# Patient Record
Sex: MALE | URBAN - METROPOLITAN AREA
[De-identification: names, ages, dates, MRNs, and addresses within clinical notes are randomized per-mention and may not be internally consistent; named-entity substitution may affect disease eponyms.]

---

## 2019-02-17 ENCOUNTER — EMERGENCY (EMERGENCY)
Facility: HOSPITAL | Age: 69
LOS: 1 days | Discharge: HOME | End: 2019-02-17
Attending: EMERGENCY MEDICINE
Payer: MEDICAID

## 2019-02-17 VITALS — SYSTOLIC BLOOD PRESSURE: 193 MMHG | DIASTOLIC BLOOD PRESSURE: 113 MMHG

## 2019-02-17 VITALS
WEIGHT: 177.91 LBS | RESPIRATION RATE: 18 BRPM | DIASTOLIC BLOOD PRESSURE: 100 MMHG | SYSTOLIC BLOOD PRESSURE: 190 MMHG | OXYGEN SATURATION: 99 % | HEIGHT: 70 IN | HEART RATE: 66 BPM | TEMPERATURE: 98 F

## 2019-02-17 PROCEDURE — 99283 EMERGENCY DEPT VISIT LOW MDM: CPT | Mod: GC

## 2019-02-17 PROCEDURE — 73562 X-RAY EXAM OF KNEE 3: CPT | Mod: 26,RT

## 2019-02-17 RX ORDER — IBUPROFEN 200 MG
600 TABLET ORAL ONCE
Refills: 0 | Status: COMPLETED | OUTPATIENT
Start: 2019-02-17 | End: 2019-02-17

## 2019-02-17 RX ADMIN — Medication 600 MILLIGRAM(S): at 11:25

## 2019-02-17 NOTE — ED PROVIDER NOTE - PROGRESS NOTE DETAILS
Attending MD Cordon: Patient re-evaluated and no acute issues at  this time.  Radiology test reviewed with patient and family.  Extensive discussion with patient, son and cousin about need for PMD for control of BP.  Recommended primary care clinic follow up in 1-3 days.  Patient stable for discharge. Follow up instructions given, importance of follow up emphasized, return to ED parameters reviewed and patient verbalized understanding.  All questions answered, all concerns addressed. PGY1/MD Judy. bandage on right knee, pt is given precaution. I have given the pt strict return and follow up precautions. The patient has been provided with a copy of all pertinent results. The patient has been informed of all concerning signs and symptoms to return to Emergency Department, the necessity to follow up with PMD/Clinic/follow up provided within 2-3 days was explained, and the patient reports understanding of above with capacity and insight.

## 2019-02-17 NOTE — ED PROVIDER NOTE - NSFOLLOWUPCLINICS_GEN_ALL_ED_FT
[facility] General Internal Medicine  General Internal Medicine  [address] [id number]  Phone: [phone]  Fax:   Follow Up Time: 1-3 Days

## 2019-02-17 NOTE — ED ADULT NURSE NOTE - CCCP TRG CHIEF CMPLNT
Was the patient seen in the last year in this department? Yes     Does patient have an active prescription for medications requested? No     Received Request Via: Pharmacy   pain, knee

## 2019-02-17 NOTE — ED PROVIDER NOTE - PHYSICAL EXAMINATION
PGY1/MD Judy.   VITALS: reviewed  GEN: No apparent distress, A & O x 4  HEAD/EYES: NC/AT,  anicteric sclerae, no conjunctival pallor  ENT: mucus membranes moist, oropharynx WNL, neck is supple  RESP: lungs CTA with equal breath sounds bilaterally, chest wall nontender and atraumatic  CV: heart with reg rhythm S1, S2, no murmur; distal pulses intact and symmetric bilaterally  ABDOMEN: normoactive bowel sounds, soft, nondistended, nontender, no palpable masses  : no CVAT  MSK: extremities atraumatic and nontender, no edema, no asymmetry. the back is without midline or lateral tenderness, there is no spinal deformity or stepoff and the back is ranged painlessly. the neck has no midline tenderness, deformity, or stepoff, and is ranged painlessly.  SKIN: warm, dry, no rash, no bruising, no cyanosis. color appropriate for ethnicity  NEURO: alert, mentating appropriately, no facial asymmetry.  PSYCH: Affect appropriate    b/l Knee Exam  Sensation: sensation intact to light touch in 5th/1st finger volar tip, inner/outer legs.  Motor: 5/5 strength of plantar/dorsal flexon of the ankle, extension/flexon of the knee, and hip with stable gait.  ROM: full range of motion of right/left ankle, right knee with pain but no limited ROM, bearing weight. +right upper internal joint space tenderness. Negative Janene's, negative Lachman's, anterior/posterior drawer test.  Skin/Inspection: No erythema, no abrasion, no bruises, no effusion appreciated. No displaced patella, or patella tenderness.  Vascular: CRT<2sec in all digits.

## 2019-02-17 NOTE — ED PROVIDER NOTE - OBJECTIVE STATEMENT
PGY1/MD Judy. 69 yo M with no PMH (of note, pt presents to the ED with ZQ=084/100), p/w right knee pain x2-3 days. Worsening, dull, 6/10, no radiation, no erythema/warmth, took tylenol last night with minimum relief, aggravated by moving. Pt started a discomfort on his left knee today. No trauma, no travel. No fever, other joint pain is reported. no discharger from penis or eyes, no burning sensation. No headache, blurry vision or chest pain.

## 2019-02-17 NOTE — ED PROVIDER NOTE - CLINICAL SUMMARY MEDICAL DECISION MAKING FREE TEXT BOX
PGY1/MD Judy. 69 yo M with no PMH, but came with AT=174/100 no ha or chest pain, p/w worsening right knee pain x 2-3 days. No fever, swelling or erythema is appreciated. No signs of septic joint. likely pseudo gout given his high BP or OA. xray, pain med. likely discharge with PMD f/u.

## 2019-02-17 NOTE — ED PROVIDER NOTE - NSFOLLOWUPINSTRUCTIONS_ED_ALL_ED_FT
- FOLLOW UP WITH PRIMARY CARE CLINIC IN 1-3 DAYS  - Return to the ED for new or worsening symptoms.  - Follow up with Orthopedics for your knee pain.  - You may take over the counter pain medication as per package instructions. - FOLLOW UP WITH PRIMARY CARE CLINIC IN 1-3 DAYS  - Return to the ED for new or worsening symptoms.  - Follow up with Orthopedics for your knee pain.  - You may take over the counter pain medication as per package instructions.    1) Take 400-600mg Motrin (also called Advil or Mortrin) every 6 hours as needed for fever/pain/discomfort. You can take this with Tylenol 650-1000 mg (also called acetaminophen) every 6 hours.  Don't use more than 4000 mg of Tylenol in any 24-hour period. Make sure your other prescription/over-the-counter medications don't contain any Tylenol so you don't take too much. If you have any stomach discomfort while taking Motrin, you can use TUMS or Pepcid or Zantac (these can all be bought without a prescription). Please do not take these medications if you do no have pain or if you have any history of bleeding disorders, kidney or liver disease. Do not use ibuprofen if you are on blood thinners (anti-coagulation).  2) If your pain is NOT well controlled with these medications, take pain medication as prescribed.  3) Drink at least 2 Liters or 64 Ounces of water each day.

## 2019-02-17 NOTE — ED ADULT NURSE NOTE - NSIMPLEMENTINTERV_GEN_ALL_ED
Implemented All Universal Safety Interventions:  [place] to call system. Call bell, personal items and telephone within reach. Instruct patient to call for assistance. Room bathroom lighting operational. Non-slip footwear when patient is off stretcher. Physically safe environment: no spills, clutter or unnecessary equipment. Stretcher in lowest position, wheels locked, appropriate side rails in place.

## 2019-02-17 NOTE — ED ADULT NURSE NOTE - OBJECTIVE STATEMENT
Pt c/o right knee pain x "a couple of days."  Pt Pt c/o right knee pain x "a couple of days."  Pt denies any traumatic event or previous history of injury or surgeries to the joint.  Knee pain is medial, worse with bending the knee, better with flexing, ttp, worse in the morning, better when he starts moving around, difficulty weight bearing.  No swelling or redness to area, denies fevers.  Pain radiates up to his lower back.  Tried Tylenol last night without any relief, nothing today.  Pt ambulated to exam room without any active distress.  Pt is conversive, has pain with passive bending of knee, denies numbness/tingling.    denies history of htn.

## 2019-02-17 NOTE — ED PROVIDER NOTE - ATTENDING CONTRIBUTION TO CARE
Attending MD Cordon: I personally have seen and examined this patient.  Resident note reviewed and agree on plan of care and except where noted.  See below for details.     Seen in FT6, son and cousin, does not have PMD    68M with no reported PMH/PSH/Meds/Allergies presents to the ED with R knee pain since 2/8/19 (Friday).  Reports intermittent, reports flexing knee, improved with straightening knee.  Resting improved pain. Denies taking anything for the pain.  Reports sometimes feels it more under the knee and sometimes over the knee.  Reports able to ambulate.  Denies chest pain, shortness of breath, palpitations. Denies abdominal pain, nausea, vomiting, diarrhea, blood in stools. Denies fevers, chills, dizziness, headache. Denies change in vision, double vision, sudden loss of vision.  Reports quit tobacco 6-7 years ago, denies EtOH, denies drugs.  Denies heavy lifting for work.  On exam, NAD, unlabored breathing, head NCAT, PERRL, FROM at neck, no tenderness to midline palpation, no stepoffs along length of spine, lungs CTAB with good inspiratory effort, +S1S2, no m/r/g, abdomen soft with +BS, NT, ND, no CVAT, moving all extremities with 5/5 strength bilateral upper and lower extremities, good and equal  strength bilaterally, able to range bilateral knees, no erythema, no swelling, no warmth, FROM at R knee, +2 DPs; A/P: 68M with R knee pain, no red flags for septic joint, no effusion, ranging freely, suspect arthritic changes, will obtain XR but doubt bony injury

## 2019-02-20 ENCOUNTER — OUTPATIENT (OUTPATIENT)
Dept: OUTPATIENT SERVICES | Facility: HOSPITAL | Age: 69
LOS: 1 days | End: 2019-02-20
Payer: MEDICAID

## 2019-02-20 ENCOUNTER — APPOINTMENT (OUTPATIENT)
Dept: INTERNAL MEDICINE | Facility: CLINIC | Age: 69
End: 2019-02-20

## 2019-02-20 ENCOUNTER — NON-APPOINTMENT (OUTPATIENT)
Age: 69
End: 2019-02-20

## 2019-02-20 VITALS
DIASTOLIC BLOOD PRESSURE: 100 MMHG | SYSTOLIC BLOOD PRESSURE: 160 MMHG | BODY MASS INDEX: 27.28 KG/M2 | WEIGHT: 180 LBS | HEIGHT: 68 IN

## 2019-02-20 DIAGNOSIS — Z84.1 FAMILY HISTORY OF DISORDERS OF KIDNEY AND URETER: ICD-10-CM

## 2019-02-20 DIAGNOSIS — Z87.891 PERSONAL HISTORY OF NICOTINE DEPENDENCE: ICD-10-CM

## 2019-02-20 DIAGNOSIS — I10 ESSENTIAL (PRIMARY) HYPERTENSION: ICD-10-CM

## 2019-02-20 DIAGNOSIS — M17.10 UNILATERAL PRIMARY OSTEOARTHRITIS, UNSPECIFIED KNEE: ICD-10-CM

## 2019-02-20 DIAGNOSIS — Z00.00 ENCOUNTER FOR GENERAL ADULT MEDICAL EXAMINATION W/OUT ABNORMAL FINDINGS: ICD-10-CM

## 2019-02-21 NOTE — HISTORY OF PRESENT ILLNESS
[de-identified] : Patient is a 67 yo M w/no reported PMH presenting to establish care. He was born in [facility]. He moved to the [place] in [place].\par \par He does not know the last time he saw a doctor.\par \par He was seen in the ED on 2/17 with complaint of R knee pain. Xrays at that time only showed small tricompartmental marginal osteophytes. He was found to be hypertensive to 190s/100s. He was not given any medications for hypertension. No labs were drawn. He was instructed to follow up with a PMD as soon as possible.\par \par He has never had surgery.\par \par He reports mother had heart disease and father had kidney problems. 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 Retired 2 years ago, worked for around 20 years.\par \par He lives with his cousin and son at home.\par \par He is a former smoker, quit 7 years ago. He used to smoke 2 cigarettes per day for 45-50 years. He denies EtOH use. He denies recreational drug use. He is sexually active, monogamous. He is interested in being tested for STIs today.\par \par He only takes Tylenol 2 extra strength Tylenol Q6 hours for the last 3 days. Reports not change in knee pain. Reports knee stiffness worse after seated for a long time and in the morning. Has not tried NSAIDs.\par \par He does not remember his last tetanus shot. Declining flu shot today.

## 2019-02-21 NOTE — PHYSICAL EXAM
[No Acute Distress] : no acute distress [Well Nourished] : well nourished [Well Developed] : well developed [Well-Appearing] : well-appearing [Normal Sclera/Conjunctiva] : normal sclera/conjunctiva [PERRL] : pupils equal round and reactive to light [EOMI] : extraocular movements intact [Normal Outer Ear/Nose] : the outer ears and nose were normal in appearance [Normal Oropharynx] : the oropharynx was normal [Normal TMs] : both tympanic membranes were normal [Supple] : supple [No Lymphadenopathy] : no lymphadenopathy [Thyroid Normal, No Nodules] : the thyroid was normal and there were no nodules present [No Respiratory Distress] : no respiratory distress  [Clear to Auscultation] : lungs were clear to auscultation bilaterally [Normal Rate] : normal rate  [Regular Rhythm] : with a regular rhythm [Normal S1, S2] : normal S1 and S2 [No Murmur] : no murmur heard [No Edema] : there was no peripheral edema [Soft] : abdomen soft [Non Tender] : non-tender [Non-distended] : non-distended [Normal Bowel Sounds] : normal bowel sounds [Grossly Normal Strength/Tone] : grossly normal strength/tone [No Rash] : no rash [Normal Gait] : normal gait [Coordination Grossly Intact] : coordination grossly intact [No Focal Deficits] : no focal deficits [Normal Affect] : the affect was normal [Normal Insight/Judgement] : insight and judgment were intact [de-identified] : no effusion noted bilateral knees, +crepitus bilateral knees

## 2019-02-21 NOTE — ASSESSMENT
[FreeTextEntry1] : 69 yo M w/no reported PMH presenting to establish care\par \par 1. HTN\par - patient remains hypertensive in clinic today on 2 checks\par - will start Lisinopril 10mg daily and patient to RTC 5 weeks for BP check\par - EKG ?evidence of LVH, some strain with lateral lead TWI\par - ophthalmology referral for retinal exam\par \par 2. Knee pain\par - secondary to OA\par - recommended trial of NSAIDs and Tylenol\par - PT referral provided\par - ortho referral given so patient may make appointment if PT and trial NSADs not successful to eval for injection\par \par 3. HCM\par - FIT test today\par - TdaP and Prevnar today\par - CBC, CMP, Lipid profile, A1c today\par - HIV, Syphilis and Hep C screen today\par \par RTC 5 weeks for BP check\par \par dw Dr. Greene\par \par Deanna Cunningham, R3

## 2019-02-21 NOTE — REVIEW OF SYSTEMS
[Joint Pain] : joint pain [Fever] : no fever [Chills] : no chills [Fatigue] : no fatigue [Discharge] : no discharge [Pain] : no pain [Vision Problems] : no vision problems [Earache] : no earache [Hearing Loss] : no hearing loss [Nasal Discharge] : no nasal discharge [Sore Throat] : no sore throat [Chest Pain] : no chest pain [Shortness Of Breath] : no shortness of breath [Wheezing] : no wheezing [Abdominal Pain] : no abdominal pain [Vomiting] : no vomiting [Dysuria] : no dysuria [Incontinence] : no incontinence [Nocturia] : no nocturia [Muscle Pain] : no muscle pain [Back Pain] : no back pain [Anxiety] : no anxiety [Depression] : no depression

## 2019-02-25 DIAGNOSIS — E11.9 TYPE 2 DIABETES MELLITUS W/OUT COMPLICATIONS: ICD-10-CM

## 2019-02-25 RX ORDER — ATORVASTATIN CALCIUM 40 MG/1
40 TABLET, FILM COATED ORAL
Qty: 1 | Refills: 3 | Status: ACTIVE | COMMUNITY
Start: 2019-02-25 | End: 1900-01-01

## 2019-03-01 DIAGNOSIS — M17.10 UNILATERAL PRIMARY OSTEOARTHRITIS, UNSPECIFIED KNEE: ICD-10-CM

## 2019-03-27 LAB
ALBUMIN SERPL ELPH-MCNC: 4.8 G/DL
ALP BLD-CCNC: 79 U/L
ALT SERPL-CCNC: 15 U/L
ANION GAP SERPL CALC-SCNC: 25 MMOL/L
AST SERPL-CCNC: 21 U/L
BASOPHILS # BLD AUTO: 0.06 K/UL
BASOPHILS NFR BLD AUTO: 0.7 %
BILIRUB SERPL-MCNC: <0.2 MG/DL
BUN SERPL-MCNC: 17 MG/DL
CALCIUM SERPL-MCNC: 10 MG/DL
CHLORIDE SERPL-SCNC: 108 MMOL/L
CHOLEST SERPL-MCNC: 285 MG/DL
CHOLEST/HDLC SERPL: 7.1 RATIO
CO2 SERPL-SCNC: 21 MMOL/L
CREAT SERPL-MCNC: 0.89 MG/DL
EOSINOPHIL # BLD AUTO: 0.16 K/UL
EOSINOPHIL NFR BLD AUTO: 1.8 %
GLUCOSE SERPL-MCNC: 100 MG/DL
HBA1C MFR BLD HPLC: 6.6 %
HCT VFR BLD CALC: 48.5 %
HDLC SERPL-MCNC: 40 MG/DL
HGB BLD-MCNC: 14.7 G/DL
HIV1+2 AB SPEC QL IA.RAPID: NONREACTIVE
IMM GRANULOCYTES NFR BLD AUTO: 0.2 %
LDLC SERPL CALC-MCNC: NORMAL MG/DL
LYMPHOCYTES # BLD AUTO: 2.4 K/UL
LYMPHOCYTES NFR BLD AUTO: 27.4 %
MAN DIFF?: NORMAL
MCHC RBC-ENTMCNC: 27.9 PG
MCHC RBC-ENTMCNC: 30.3 GM/DL
MCV RBC AUTO: 92 FL
MONOCYTES # BLD AUTO: 0.7 K/UL
MONOCYTES NFR BLD AUTO: 8 %
NEUTROPHILS # BLD AUTO: 5.42 K/UL
NEUTROPHILS NFR BLD AUTO: 61.9 %
PLATELET # BLD AUTO: 276 K/UL
POTASSIUM SERPL-SCNC: 4.3 MMOL/L
PROT SERPL-MCNC: 8 G/DL
RBC # BLD: 5.27 M/UL
RBC # FLD: 14.1 %
SODIUM SERPL-SCNC: 154 MMOL/L
T PALLIDUM AB SER QL IA: NEGATIVE
TRIGL SERPL-MCNC: 407 MG/DL
WBC # FLD AUTO: 8.76 K/UL

## 2019-10-01 ENCOUNTER — OUTPATIENT (OUTPATIENT)
Dept: OUTPATIENT SERVICES | Facility: HOSPITAL | Age: 69
LOS: 1 days | End: 2019-10-01
Payer: MEDICARE

## 2019-10-11 ENCOUNTER — INPATIENT (INPATIENT)
Facility: HOSPITAL | Age: 69
LOS: 9 days | Discharge: HOME | DRG: 233 | End: 2019-10-21
Attending: THORACIC SURGERY (CARDIOTHORACIC VASCULAR SURGERY) | Admitting: HOSPITALIST
Payer: MEDICARE

## 2019-10-11 VITALS
OXYGEN SATURATION: 94 % | DIASTOLIC BLOOD PRESSURE: 111 MMHG | TEMPERATURE: 98 F | HEART RATE: 72 BPM | WEIGHT: 177.91 LBS | HEIGHT: 70 IN | SYSTOLIC BLOOD PRESSURE: 180 MMHG | RESPIRATION RATE: 18 BRPM

## 2019-10-11 DIAGNOSIS — R07.9 CHEST PAIN, UNSPECIFIED: ICD-10-CM

## 2019-10-11 LAB
ALBUMIN SERPL ELPH-MCNC: 4.2 G/DL — SIGNIFICANT CHANGE UP (ref 3.3–5)
ALP SERPL-CCNC: 74 U/L — SIGNIFICANT CHANGE UP (ref 40–120)
ALT FLD-CCNC: 19 U/L — SIGNIFICANT CHANGE UP (ref 10–45)
ANION GAP SERPL CALC-SCNC: 11 MMOL/L — SIGNIFICANT CHANGE UP (ref 5–17)
APTT BLD: 33.4 SEC — SIGNIFICANT CHANGE UP (ref 27.5–36.3)
AST SERPL-CCNC: 17 U/L — SIGNIFICANT CHANGE UP (ref 10–40)
BASOPHILS # BLD AUTO: 0.04 K/UL — SIGNIFICANT CHANGE UP (ref 0–0.2)
BASOPHILS NFR BLD AUTO: 0.6 % — SIGNIFICANT CHANGE UP (ref 0–2)
BILIRUB SERPL-MCNC: 0.3 MG/DL — SIGNIFICANT CHANGE UP (ref 0.2–1.2)
BUN SERPL-MCNC: 21 MG/DL — SIGNIFICANT CHANGE UP (ref 7–23)
CALCIUM SERPL-MCNC: 9.1 MG/DL — SIGNIFICANT CHANGE UP (ref 8.4–10.5)
CHLORIDE SERPL-SCNC: 105 MMOL/L — SIGNIFICANT CHANGE UP (ref 96–108)
CK MB CFR SERPL CALC: 6.6 NG/ML — SIGNIFICANT CHANGE UP (ref 0–6.7)
CK MB CFR SERPL CALC: 6.7 NG/ML — SIGNIFICANT CHANGE UP (ref 0–6.7)
CO2 SERPL-SCNC: 27 MMOL/L — SIGNIFICANT CHANGE UP (ref 22–31)
CREAT SERPL-MCNC: 0.82 MG/DL — SIGNIFICANT CHANGE UP (ref 0.5–1.3)
EOSINOPHIL # BLD AUTO: 0.2 K/UL — SIGNIFICANT CHANGE UP (ref 0–0.5)
EOSINOPHIL NFR BLD AUTO: 2.8 % — SIGNIFICANT CHANGE UP (ref 0–6)
GLUCOSE SERPL-MCNC: 126 MG/DL — HIGH (ref 70–99)
HCT VFR BLD CALC: 40.9 % — SIGNIFICANT CHANGE UP (ref 39–50)
HGB BLD-MCNC: 13.3 G/DL — SIGNIFICANT CHANGE UP (ref 13–17)
IMM GRANULOCYTES NFR BLD AUTO: 0.1 % — SIGNIFICANT CHANGE UP (ref 0–1.5)
INR BLD: 1.08 RATIO — SIGNIFICANT CHANGE UP (ref 0.88–1.16)
LYMPHOCYTES # BLD AUTO: 2.5 K/UL — SIGNIFICANT CHANGE UP (ref 1–3.3)
LYMPHOCYTES # BLD AUTO: 35.4 % — SIGNIFICANT CHANGE UP (ref 13–44)
MAGNESIUM SERPL-MCNC: 2.4 MG/DL — SIGNIFICANT CHANGE UP (ref 1.6–2.6)
MCHC RBC-ENTMCNC: 28.8 PG — SIGNIFICANT CHANGE UP (ref 27–34)
MCHC RBC-ENTMCNC: 32.5 GM/DL — SIGNIFICANT CHANGE UP (ref 32–36)
MCV RBC AUTO: 88.5 FL — SIGNIFICANT CHANGE UP (ref 80–100)
MONOCYTES # BLD AUTO: 0.57 K/UL — SIGNIFICANT CHANGE UP (ref 0–0.9)
MONOCYTES NFR BLD AUTO: 8.1 % — SIGNIFICANT CHANGE UP (ref 2–14)
NEUTROPHILS # BLD AUTO: 3.75 K/UL — SIGNIFICANT CHANGE UP (ref 1.8–7.4)
NEUTROPHILS NFR BLD AUTO: 53 % — SIGNIFICANT CHANGE UP (ref 43–77)
NRBC # BLD: 0 /100 WBCS — SIGNIFICANT CHANGE UP (ref 0–0)
PHOSPHATE SERPL-MCNC: 2.6 MG/DL — SIGNIFICANT CHANGE UP (ref 2.5–4.5)
PLATELET # BLD AUTO: 264 K/UL — SIGNIFICANT CHANGE UP (ref 150–400)
POTASSIUM SERPL-MCNC: 3.8 MMOL/L — SIGNIFICANT CHANGE UP (ref 3.5–5.3)
POTASSIUM SERPL-SCNC: 3.8 MMOL/L — SIGNIFICANT CHANGE UP (ref 3.5–5.3)
PROT SERPL-MCNC: 7.5 G/DL — SIGNIFICANT CHANGE UP (ref 6–8.3)
PROTHROM AB SERPL-ACNC: 12.3 SEC — SIGNIFICANT CHANGE UP (ref 10–12.9)
RBC # BLD: 4.62 M/UL — SIGNIFICANT CHANGE UP (ref 4.2–5.8)
RBC # FLD: 13.7 % — SIGNIFICANT CHANGE UP (ref 10.3–14.5)
SODIUM SERPL-SCNC: 143 MMOL/L — SIGNIFICANT CHANGE UP (ref 135–145)
TROPONIN T, HIGH SENSITIVITY RESULT: 49 NG/L — SIGNIFICANT CHANGE UP (ref 0–51)
TROPONIN T, HIGH SENSITIVITY RESULT: 54 NG/L — HIGH (ref 0–51)
WBC # BLD: 7.07 K/UL — SIGNIFICANT CHANGE UP (ref 3.8–10.5)
WBC # FLD AUTO: 7.07 K/UL — SIGNIFICANT CHANGE UP (ref 3.8–10.5)

## 2019-10-11 PROCEDURE — 93010 ELECTROCARDIOGRAM REPORT: CPT

## 2019-10-11 PROCEDURE — 99291 CRITICAL CARE FIRST HOUR: CPT

## 2019-10-11 PROCEDURE — 71046 X-RAY EXAM CHEST 2 VIEWS: CPT | Mod: 26

## 2019-10-11 RX ORDER — HEPARIN SODIUM 5000 [USP'U]/ML
4700 INJECTION INTRAVENOUS; SUBCUTANEOUS ONCE
Refills: 0 | Status: COMPLETED | OUTPATIENT
Start: 2019-10-11 | End: 2019-10-11

## 2019-10-11 RX ORDER — HEPARIN SODIUM 5000 [USP'U]/ML
4700 INJECTION INTRAVENOUS; SUBCUTANEOUS EVERY 6 HOURS
Refills: 0 | Status: DISCONTINUED | OUTPATIENT
Start: 2019-10-11 | End: 2019-10-15

## 2019-10-11 RX ORDER — HEPARIN SODIUM 5000 [USP'U]/ML
INJECTION INTRAVENOUS; SUBCUTANEOUS
Qty: 25000 | Refills: 0 | Status: DISCONTINUED | OUTPATIENT
Start: 2019-10-11 | End: 2019-10-15

## 2019-10-11 RX ORDER — ASPIRIN/CALCIUM CARB/MAGNESIUM 324 MG
324 TABLET ORAL ONCE
Refills: 0 | Status: COMPLETED | OUTPATIENT
Start: 2019-10-11 | End: 2019-10-11

## 2019-10-11 RX ORDER — TICAGRELOR 90 MG/1
180 TABLET ORAL ONCE
Refills: 0 | Status: COMPLETED | OUTPATIENT
Start: 2019-10-11 | End: 2019-10-11

## 2019-10-11 RX ADMIN — Medication 324 MILLIGRAM(S): at 21:20

## 2019-10-11 RX ADMIN — HEPARIN SODIUM 950 UNIT(S)/HR: 5000 INJECTION INTRAVENOUS; SUBCUTANEOUS at 22:44

## 2019-10-11 RX ADMIN — HEPARIN SODIUM 4700 UNIT(S): 5000 INJECTION INTRAVENOUS; SUBCUTANEOUS at 22:46

## 2019-10-11 RX ADMIN — TICAGRELOR 180 MILLIGRAM(S): 90 TABLET ORAL at 21:30

## 2019-10-11 NOTE — ED ADULT NURSE NOTE - OBJECTIVE STATEMENT
68 y/o M pt w/ no pmh, present to ED for left neck, shoulder, chest and arm pain, symptoms started couple days ago, pt report pain worsen with exertion, along with SOB, pain is sharp in nature, here ED pt A&Ox3, breathing spontaneous, unlabored w/o distress on room air, report left neck pain, no chest or arm pain at this, S1, S2 heard, neg JVD, peripheral pulses strong and present, MAERx4, heplock placed, labs drawn, placed on CM NSR, awaits MD daniels

## 2019-10-11 NOTE — ED PROVIDER NOTE - ATTENDING CONTRIBUTION TO CARE
Agree with above, Angel Lindsey MD, FACEP  Based on patient's history and physical exam, as well as the results of today's workup, I feel that patient warrants admission to the hospital for further workup/evaluation and continued management. I discussed the findings of today's workup with the patient and addressed the patient's questions and concerns. The patient was agreeable with admission. Our team spoke with the inpatient receiving team who accepted the patient for admission and subsequently took over the patient's care at the time of admission.

## 2019-10-11 NOTE — ED PROVIDER NOTE - OBJECTIVE STATEMENT
68yo male no PMHx presents with 3 day history of left sided neck and chest pain.  Reports sudden onset of left neck pain 3 days ago with radiation down the back to the left breast area. Intermittent, worsen with exertion and leaning forward. Pain starts as sharp then became duller. Also reports occasional numbness of the left arm and dyspnea. No prior similar event. No known cardiac history.  Denies fever, chill, dizziness, headache, n/v/c/d, or urinary symptoms.   FHx of MI in mother age 40s.  Former smoker 1ppd x 30 years, quitted 2017.

## 2019-10-11 NOTE — ED PROVIDER NOTE - CLINICAL SUMMARY MEDICAL DECISION MAKING FREE TEXT BOX
68yo male no PMHx presents with 3 day history of left sided neck and chest pain. Need to r/o cardiac etiology given exertional component and family history. Check basic labs, trop/ckmb, cxray and EKG.

## 2019-10-11 NOTE — ED ADULT NURSE REASSESSMENT NOTE - NS ED NURSE REASSESS COMMENT FT1
Second IV placed for additional access. Heparin bolus and infusion initiated as per orders based off Heparin nomogram. Second RN present to confirm correct medication administration. Patient currently safe and comfortable. Pt educated on S/S of bleeding, verbalized understanding. Will continue to monitor.

## 2019-10-11 NOTE — ED PROVIDER NOTE - NSTIMEPROVIDERCAREINITIATE_GEN_ER
I will START or STAY ON the medications listed below when I get home from the hospital:    Cipro 500 mg oral tablet  -- 500 tab(s) by mouth every 12 hours   -- Avoid prolonged or excessive exposure to direct and/or artificial sunlight while taking this medication.  Check with your doctor before becoming pregnant.  Do not take dairy products, antacids, or iron preparations within one hour of this medication.  Finish all this medication unless otherwise directed by prescriber.  Medication should be taken with plenty of water.    -- Indication: For Infection
11-Oct-2019 19:07

## 2019-10-11 NOTE — ED ADULT NURSE REASSESSMENT NOTE - NS ED NURSE REASSESS COMMENT FT1
Pt is breathing unlabored on RA. VSS. Pt endorses pain to L neck, L side of chest, and L arm. Educated pt on plan of care. Safety and comfort maintained. Awaiting lab results. Pt NSR on cardiac monitor. Family at bedside. Call bell within reach.

## 2019-10-11 NOTE — ED PROVIDER NOTE - CRITICAL CARE PROVIDED
Sedation, oxygen delivery, and monitoring per anesthesia for endoscopic procedure.  
direct patient care (not related to procedure)/additional history taking/interpretation of diagnostic studies/documentation/consultation with other physicians/consult w/ pt's family directly relating to pts condition

## 2019-10-11 NOTE — ED PROVIDER NOTE - NS ED ROS FT
REVIEW OF SYSTEMS:    CONSTITUTIONAL: No weakness, fevers or chills  EYES: No vertigo or throat pain  ENT: No visual changes, eye pain  MOUTH: moist jose manuel mucosal, no mouth ulcers  NECK: + left sided neck pain. No stiffness  RESPIRATORY: No cough, wheezing, hemoptysis; No shortness of breath  CARDIOVASCULAR: + left breast pain. No palpitations  GASTROINTESTINAL: No abdominal or epigastric pain. No nausea, vomiting, or hematemesis; No diarrhea or constipation. No melena or hematochezia.  GENITOURINARY: No dysuria, frequency or hematuria  NEUROLOGICAL: + occasional numbness in the left arm  SKIN: No itching, rashes

## 2019-10-11 NOTE — ED ADULT NURSE NOTE - NSIMPLEMENTINTERV_GEN_ALL_ED
Implemented All Universal Safety Interventions:  [place] to call system. Call bell, personal items and telephone within reach. Instruct patient to call for assistance. Room bathroom lighting operational. Non-slip footwear when patient is off stretcher. Physically safe environment: no spills, clutter or unnecessary equipment. Stretcher in lowest position, wheels locked, appropriate side rails in place.

## 2019-10-11 NOTE — ED PROVIDER NOTE - PHYSICAL EXAMINATION
PHYSICAL EXAM:  GENERAL: NAD, well-developed  HEAD:  Atraumatic, Normocephalic  EYES: EOMI, PERRLA, conjunctiva and sclera clear  NECK: Supple. TTP of the left base of the neck  CHEST/LUNG: Clear to auscultation bilaterally; No wheeze. No reproducible chest pain  HEART: Regular rate and rhythm; No murmurs, rubs, or gallops  ABDOMEN: Soft, Nontender, Nondistended; Bowel sounds present  EXTREMITIES:  2+ Peripheral Pulses, No clubbing, cyanosis, or edema  PSYCH: AAOx3  NEUROLOGY: non-focal  SKIN: No rashes or lesions

## 2019-10-12 DIAGNOSIS — I10 ESSENTIAL (PRIMARY) HYPERTENSION: ICD-10-CM

## 2019-10-12 DIAGNOSIS — E78.5 HYPERLIPIDEMIA, UNSPECIFIED: ICD-10-CM

## 2019-10-12 DIAGNOSIS — Z29.9 ENCOUNTER FOR PROPHYLACTIC MEASURES, UNSPECIFIED: ICD-10-CM

## 2019-10-12 DIAGNOSIS — Z87.898 PERSONAL HISTORY OF OTHER SPECIFIED CONDITIONS: ICD-10-CM

## 2019-10-12 DIAGNOSIS — I20.0 UNSTABLE ANGINA: ICD-10-CM

## 2019-10-12 LAB
ANION GAP SERPL CALC-SCNC: 13 MMOL/L — SIGNIFICANT CHANGE UP (ref 5–17)
APTT BLD: 52.2 SEC — HIGH (ref 27.5–36.3)
APTT BLD: 58.5 SEC — HIGH (ref 27.5–36.3)
APTT BLD: 83.8 SEC — HIGH (ref 27.5–36.3)
BASOPHILS # BLD AUTO: 0.05 K/UL — SIGNIFICANT CHANGE UP (ref 0–0.2)
BASOPHILS NFR BLD AUTO: 0.7 % — SIGNIFICANT CHANGE UP (ref 0–2)
BLD GP AB SCN SERPL QL: NEGATIVE — SIGNIFICANT CHANGE UP
BUN SERPL-MCNC: 13 MG/DL — SIGNIFICANT CHANGE UP (ref 7–23)
CALCIUM SERPL-MCNC: 8.9 MG/DL — SIGNIFICANT CHANGE UP (ref 8.4–10.5)
CHLORIDE SERPL-SCNC: 103 MMOL/L — SIGNIFICANT CHANGE UP (ref 96–108)
CHOLEST SERPL-MCNC: 244 MG/DL — HIGH (ref 10–199)
CO2 SERPL-SCNC: 23 MMOL/L — SIGNIFICANT CHANGE UP (ref 22–31)
CREAT SERPL-MCNC: 0.57 MG/DL — SIGNIFICANT CHANGE UP (ref 0.5–1.3)
EOSINOPHIL # BLD AUTO: 0.2 K/UL — SIGNIFICANT CHANGE UP (ref 0–0.5)
EOSINOPHIL NFR BLD AUTO: 2.8 % — SIGNIFICANT CHANGE UP (ref 0–6)
GLUCOSE SERPL-MCNC: 107 MG/DL — HIGH (ref 70–99)
HBA1C BLD-MCNC: 6.2 % — HIGH (ref 4–5.6)
HCT VFR BLD CALC: 42.9 % — SIGNIFICANT CHANGE UP (ref 39–50)
HCT VFR BLD CALC: 43.9 % — SIGNIFICANT CHANGE UP (ref 39–50)
HDLC SERPL-MCNC: 48 MG/DL — SIGNIFICANT CHANGE UP
HGB BLD-MCNC: 14.3 G/DL — SIGNIFICANT CHANGE UP (ref 13–17)
HGB BLD-MCNC: 14.3 G/DL — SIGNIFICANT CHANGE UP (ref 13–17)
IMM GRANULOCYTES NFR BLD AUTO: 0.4 % — SIGNIFICANT CHANGE UP (ref 0–1.5)
LIPID PNL WITH DIRECT LDL SERPL: 176 MG/DL — HIGH
LYMPHOCYTES # BLD AUTO: 1.94 K/UL — SIGNIFICANT CHANGE UP (ref 1–3.3)
LYMPHOCYTES # BLD AUTO: 27.6 % — SIGNIFICANT CHANGE UP (ref 13–44)
MAGNESIUM SERPL-MCNC: 2.2 MG/DL — SIGNIFICANT CHANGE UP (ref 1.6–2.6)
MCHC RBC-ENTMCNC: 28.5 PG — SIGNIFICANT CHANGE UP (ref 27–34)
MCHC RBC-ENTMCNC: 28.8 PG — SIGNIFICANT CHANGE UP (ref 27–34)
MCHC RBC-ENTMCNC: 32.6 GM/DL — SIGNIFICANT CHANGE UP (ref 32–36)
MCHC RBC-ENTMCNC: 33.3 GM/DL — SIGNIFICANT CHANGE UP (ref 32–36)
MCV RBC AUTO: 85.5 FL — SIGNIFICANT CHANGE UP (ref 80–100)
MCV RBC AUTO: 88.5 FL — SIGNIFICANT CHANGE UP (ref 80–100)
MONOCYTES # BLD AUTO: 0.56 K/UL — SIGNIFICANT CHANGE UP (ref 0–0.9)
MONOCYTES NFR BLD AUTO: 8 % — SIGNIFICANT CHANGE UP (ref 2–14)
NEUTROPHILS # BLD AUTO: 4.25 K/UL — SIGNIFICANT CHANGE UP (ref 1.8–7.4)
NEUTROPHILS NFR BLD AUTO: 60.5 % — SIGNIFICANT CHANGE UP (ref 43–77)
NRBC # BLD: 0 /100 WBCS — SIGNIFICANT CHANGE UP (ref 0–0)
PHOSPHATE SERPL-MCNC: 2.9 MG/DL — SIGNIFICANT CHANGE UP (ref 2.5–4.5)
PLATELET # BLD AUTO: 268 K/UL — SIGNIFICANT CHANGE UP (ref 150–400)
PLATELET # BLD AUTO: 269 K/UL — SIGNIFICANT CHANGE UP (ref 150–400)
POTASSIUM SERPL-MCNC: 3.2 MMOL/L — LOW (ref 3.5–5.3)
POTASSIUM SERPL-SCNC: 3.2 MMOL/L — LOW (ref 3.5–5.3)
RBC # BLD: 4.96 M/UL — SIGNIFICANT CHANGE UP (ref 4.2–5.8)
RBC # BLD: 5.02 M/UL — SIGNIFICANT CHANGE UP (ref 4.2–5.8)
RBC # FLD: 13.4 % — SIGNIFICANT CHANGE UP (ref 10.3–14.5)
RBC # FLD: 13.7 % — SIGNIFICANT CHANGE UP (ref 10.3–14.5)
RH IG SCN BLD-IMP: POSITIVE — SIGNIFICANT CHANGE UP
SODIUM SERPL-SCNC: 139 MMOL/L — SIGNIFICANT CHANGE UP (ref 135–145)
TOTAL CHOLESTEROL/HDL RATIO MEASUREMENT: 5.1 RATIO — SIGNIFICANT CHANGE UP (ref 3.4–9.6)
TRIGL SERPL-MCNC: 99 MG/DL — SIGNIFICANT CHANGE UP (ref 10–149)
TROPONIN T, HIGH SENSITIVITY RESULT: 44 NG/L — SIGNIFICANT CHANGE UP (ref 0–51)
TSH SERPL-MCNC: 3.36 UIU/ML — SIGNIFICANT CHANGE UP (ref 0.27–4.2)
WBC # BLD: 6.56 K/UL — SIGNIFICANT CHANGE UP (ref 3.8–10.5)
WBC # BLD: 7.03 K/UL — SIGNIFICANT CHANGE UP (ref 3.8–10.5)
WBC # FLD AUTO: 6.56 K/UL — SIGNIFICANT CHANGE UP (ref 3.8–10.5)
WBC # FLD AUTO: 7.03 K/UL — SIGNIFICANT CHANGE UP (ref 3.8–10.5)

## 2019-10-12 PROCEDURE — 12345: CPT | Mod: NC,GC

## 2019-10-12 PROCEDURE — 99223 1ST HOSP IP/OBS HIGH 75: CPT

## 2019-10-12 PROCEDURE — 99223 1ST HOSP IP/OBS HIGH 75: CPT | Mod: GC

## 2019-10-12 RX ORDER — LISINOPRIL 2.5 MG/1
10 TABLET ORAL DAILY
Refills: 0 | Status: DISCONTINUED | OUTPATIENT
Start: 2019-10-12 | End: 2019-10-15

## 2019-10-12 RX ORDER — LISINOPRIL 2.5 MG/1
5 TABLET ORAL ONCE
Refills: 0 | Status: COMPLETED | OUTPATIENT
Start: 2019-10-12 | End: 2019-10-12

## 2019-10-12 RX ORDER — LISINOPRIL 2.5 MG/1
5 TABLET ORAL DAILY
Refills: 0 | Status: DISCONTINUED | OUTPATIENT
Start: 2019-10-12 | End: 2019-10-12

## 2019-10-12 RX ORDER — ASPIRIN/CALCIUM CARB/MAGNESIUM 324 MG
81 TABLET ORAL DAILY
Refills: 0 | Status: DISCONTINUED | OUTPATIENT
Start: 2019-10-12 | End: 2019-10-17

## 2019-10-12 RX ORDER — POTASSIUM CHLORIDE 20 MEQ
40 PACKET (EA) ORAL ONCE
Refills: 0 | Status: COMPLETED | OUTPATIENT
Start: 2019-10-12 | End: 2019-10-12

## 2019-10-12 RX ORDER — ATORVASTATIN CALCIUM 80 MG/1
80 TABLET, FILM COATED ORAL AT BEDTIME
Refills: 0 | Status: DISCONTINUED | OUTPATIENT
Start: 2019-10-12 | End: 2019-10-17

## 2019-10-12 RX ORDER — INFLUENZA VIRUS VACCINE 15; 15; 15; 15 UG/.5ML; UG/.5ML; UG/.5ML; UG/.5ML
0.5 SUSPENSION INTRAMUSCULAR ONCE
Refills: 0 | Status: DISCONTINUED | OUTPATIENT
Start: 2019-10-12 | End: 2019-10-15

## 2019-10-12 RX ORDER — METOPROLOL TARTRATE 50 MG
12.5 TABLET ORAL
Refills: 0 | Status: DISCONTINUED | OUTPATIENT
Start: 2019-10-12 | End: 2019-10-17

## 2019-10-12 RX ORDER — POTASSIUM CHLORIDE 20 MEQ
40 PACKET (EA) ORAL EVERY 4 HOURS
Refills: 0 | Status: COMPLETED | OUTPATIENT
Start: 2019-10-12 | End: 2019-10-12

## 2019-10-12 RX ORDER — TICAGRELOR 90 MG/1
90 TABLET ORAL EVERY 12 HOURS
Refills: 0 | Status: DISCONTINUED | OUTPATIENT
Start: 2019-10-12 | End: 2019-10-14

## 2019-10-12 RX ADMIN — Medication 81 MILLIGRAM(S): at 12:44

## 2019-10-12 RX ADMIN — LISINOPRIL 5 MILLIGRAM(S): 2.5 TABLET ORAL at 15:24

## 2019-10-12 RX ADMIN — Medication 40 MILLIEQUIVALENT(S): at 12:44

## 2019-10-12 RX ADMIN — HEPARIN SODIUM 800 UNIT(S)/HR: 5000 INJECTION INTRAVENOUS; SUBCUTANEOUS at 06:33

## 2019-10-12 RX ADMIN — LISINOPRIL 5 MILLIGRAM(S): 2.5 TABLET ORAL at 02:09

## 2019-10-12 RX ADMIN — TICAGRELOR 90 MILLIGRAM(S): 90 TABLET ORAL at 06:01

## 2019-10-12 RX ADMIN — Medication 12.5 MILLIGRAM(S): at 17:30

## 2019-10-12 RX ADMIN — HEPARIN SODIUM 950 UNIT(S)/HR: 5000 INJECTION INTRAVENOUS; SUBCUTANEOUS at 21:10

## 2019-10-12 RX ADMIN — HEPARIN SODIUM 0 UNIT(S)/HR: 5000 INJECTION INTRAVENOUS; SUBCUTANEOUS at 06:00

## 2019-10-12 RX ADMIN — ATORVASTATIN CALCIUM 80 MILLIGRAM(S): 80 TABLET, FILM COATED ORAL at 21:10

## 2019-10-12 RX ADMIN — Medication 40 MILLIEQUIVALENT(S): at 15:25

## 2019-10-12 RX ADMIN — HEPARIN SODIUM 950 UNIT(S)/HR: 5000 INJECTION INTRAVENOUS; SUBCUTANEOUS at 13:25

## 2019-10-12 RX ADMIN — Medication 40 MILLIEQUIVALENT(S): at 06:01

## 2019-10-12 RX ADMIN — ATORVASTATIN CALCIUM 80 MILLIGRAM(S): 80 TABLET, FILM COATED ORAL at 01:56

## 2019-10-12 RX ADMIN — TICAGRELOR 90 MILLIGRAM(S): 90 TABLET ORAL at 17:30

## 2019-10-12 NOTE — H&P ADULT - PROBLEM SELECTOR PLAN 1
Discussed with on-call cardiology fellow Dr. Cedric Turner who informs of concern that patient may have unstable angina and will determine in am if patient is candidate for catheterization  - s/p asa/ticagrelor load on heparin infusion  - c/w maintenance asa and ticagrelor with heparin infusion. start atorvastatin 80d.  - obtain TTE in am  - obtain lipid, HbA1c, TSH to risk stratify

## 2019-10-12 NOTE — H&P ADULT - NSICDXPASTMEDICALHX_GEN_ALL_CORE_FT
PAST MEDICAL HISTORY:  H/O impaired glucose tolerance     HLD (hyperlipidemia)     HTN (hypertension)

## 2019-10-12 NOTE — H&P ADULT - NSHPREVIEWOFSYSTEMS_GEN_ALL_CORE
CONSTITUTIONAL: No fever, no chills  EYES: No eye pain, no visual disturbance  Mouth: no pain in mouth, no cuts  RESPIRATORY: No cough, sob with chest pain  CARDIOVASCULAR: CP+, no palpitations  GASTROINTESTINAL: no abdominal pain, no n/v/d  GENITOURINARY: No dysuria, no hematuria  Heme: No easy bruising, no swelling appreciated in neck/armpit/groin  NEUROLOGICAL: No headaches, no paralysis  SKIN: No itching, no rashes  MUSCULOSKELETAL: No joint pain, no joint swelling

## 2019-10-12 NOTE — H&P ADULT - NSHPSOCIALHISTORY_GEN_ALL_CORE
former smoker as above, no alcohol abuse reported, lives with family, originally from [facility]

## 2019-10-12 NOTE — CONSULT NOTE ADULT - ASSESSMENT
70 y/o M w/ no sig PMH but is a former smoker c/o CP x 2D.    #Chest pain  -Typical by description. Concern for unstable angina.  -Ryann unremarkable thus far; can c/w trend cTn and CK/CKMB  -Reasonable to load w/ ASA/ticagrelor  -C/w hep gtt  -Provide superior BP control  -Nitro SL PRN  -Check A1c, lipids  -Resting TTE in AM  -Pt would likely benefit from ischemia eval. Favor LHC, which cannot be done until Mon vs less likely stress test.    #Will d/w attg in AM  #Call w/ any Qs    Cedric Turner MD  Cardiology Fellow  [phone]  All Cardiology service information can be found 24/7 on [email or web address], password: [email or web address] 68 y/o M w/ no sig PMH but is a former smoker c/o CP x 2D.      #1.  Chest pain  -Typical by description c/w unstable angina.  -Ryann unremarkable thus far; can c/w trend cTn and CK/CKMB  -Reasonable to load w/ ASA/ticagrelor  -C/W hep gtt  -Provide BP control  -Nitro SL PRN  -Check A1c, lipids  -Resting TTE in AM  -Pt would benefit from ischemia eval. Favor LHC, which cannot be done until Mon unless patient becomes unstable.      #Call w/ any Qs    Cerdic Turner MD  Cardiology Fellow  [phone]    Alejandro Cadet M.D.  Cardiology Attending, Consult Service  [phone] or beeper     For all Cardiology service contact information, go to [email or web address] and use "[place]" to login.   All Cardiology service information can be found 24/7 on [email or web address], password: [email or web address]

## 2019-10-12 NOTE — H&P ADULT - ATTENDING COMMENTS
Patient assigned to me by night hospitalist in charge for management and care for patient for this evening only. Care to be resumed by day hospitalist at 08:00 in the morning and thereafter.

## 2019-10-12 NOTE — H&P ADULT - ASSESSMENT
69M [place] w/ former smoker (quit 8 year; 10 pack year), impaired glucose tolerance, HTN, HLD presents to [facility] for chest pain

## 2019-10-12 NOTE — H&P ADULT - NSHPPHYSICALEXAM_GEN_ALL_CORE
Vital Signs Last 24 Hrs  T(C): 36.7 (12 Oct 2019 00:38), Max: 37.1 (11 Oct 2019 22:55)  T(F): 98.1 (12 Oct 2019 00:38), Max: 98.8 (11 Oct 2019 22:55)  HR: 64 (12 Oct 2019 00:38) (59 - 72)  BP: 178/98 (12 Oct 2019 00:38) (159/97 - 187/100)  RR: 18 (12 Oct 2019 00:38) (14 - 18)  SpO2: 97% (12 Oct 2019 00:38) (94% - 98%) on room air    GENERAL: NAD, well-developed  HEAD:  Atraumatic, Normocephalic  EYES: EOMI, PERRLA, conjunctiva and sclera clear  Mouth: MMM, no lesions  NECK: Supple, no appreciable masses  Lung: normal work of breathing, cta b/l  Chest: S1&S2+, rrr, no m/r/g appreciated  ABDOMEN: bs+, soft, nt, nd, no appreciable masses  : No longo catheter, no CVA tenderness  EXTREMITIES:  radial pulse present b/l, PT present b/l, no pitting edema present  Neuro: Alert and appropriate to conversation, no paralysis  SKIN: warm and dry, chronic burn on medial lower extremity

## 2019-10-12 NOTE — H&P ADULT - NSHPLABSRESULTS_GEN_ALL_CORE
Personally reviewed available labs, imaging and ekg  Labs  CBC Full  -  ( 11 Oct 2019 20:20 )  WBC Count : 7.07 K/uL  RBC Count : 4.62 M/uL  Hemoglobin : 13.3 g/dL  Hematocrit : 40.9 %  Platelet Count - Automated : 264 K/uL  Mean Cell Volume : 88.5 fl  Mean Cell Hemoglobin : 28.8 pg  Mean Cell Hemoglobin Concentration : 32.5 gm/dL  Auto Neutrophil # : 3.75 K/uL  Auto Lymphocyte # : 2.50 K/uL  Auto Monocyte # : 0.57 K/uL  Auto Eosinophil # : 0.20 K/uL  Auto Basophil # : 0.04 K/uL  Auto Neutrophil % : 53.0 %  Auto Lymphocyte % : 35.4 %  Auto Monocyte % : 8.1 %  Auto Eosinophil % : 2.8 %  Auto Basophil % : 0.6 %  143  |  105  |  21  ----------------------------<  126<H>  3.8   |  27  |  0.82  Ca    9.1      11 Oct 2019 20:20  Phos  2.6     10-11  Mg     2.4     10-11  TPro  7.5  /  Alb  4.2  /  TBili  0.3  /  DBili  x   /  AST  17  /  ALT  19  /  AlkPhos  74  10-11  PT/INR - ( 11 Oct 2019 22:06 )   PT: 12.3 sec;   INR: 1.08 ratio    PTT - ( 11 Oct 2019 22:06 )  PTT:33.4 sec    CARDIAC MARKERS ( 11 Oct 2019 22:06 )  x     / x     / x     / x     / 6.7 ng/mL  CARDIAC MARKERS ( 11 Oct 2019 20:20 )  x     / x     / x     / x     / 6.6 ng/mL    Imaging  CXR with no lobar airspace opacification  EKG: NSR 66bpm w/ incomplete RBBB no ST elevation c/w STEMI appreciated

## 2019-10-12 NOTE — PROGRESS NOTE ADULT - ASSESSMENT
69M [place] w/ former smoker (quit 8 year; 10 pack year), impaired glucose tolerance, HTN, HLD presents to [facility] for chest pain 69M [place] w/ former smoker (quit 8 years ago; 1/2 pack for 30 years), impaired glucose tolerance, HTN, HLD presents to [facility] for chest pain 69M [place] w/ former smoker (quit 8 years ago; 1/2 PPD for 25 years), impaired glucose tolerance, HTN, HLD presents to [facility] for chest pain

## 2019-10-12 NOTE — PROGRESS NOTE ADULT - PROBLEM SELECTOR PLAN 4
- pending lipid panel - elevated cholesterol and direct LDL w/ rest of lipid panel values wnl  - c/w atorvastatin 80mg qhs

## 2019-10-12 NOTE — CONSULT NOTE ADULT - SUBJECTIVE AND OBJECTIVE BOX
Patient seen and evaluated at bedside    Chief Complaint: CP    HPI: 68 y/o M w/ no sig PMH but is a former smoker c/o CP x 2D. Pt states that he was in his USOH but woke up w/ chest discomfort ~2D PTA. Pain is located in his L neck, L chest, and L shoulder/back/arm. Pain described as deep/sharp. It is severe in intensity. Pain is intermittent; it is exacerbated by exertion and relieves w/ rest. He reports associated dyspnea. He denies any associated nausea/diaphoresis. Pt has never had hx of CP. At baseline, his maximal ET was unltd. He denies any CP at time of my exam.      PMHx:   No pertinent past medical history      PSHx:   No significant past surgical history      Allergies:  No Known Allergies      Home Meds: None.    Current Medications:   heparin  Infusion.  Unit(s)/Hr IV Continuous <Continuous>  heparin  Injectable 4700 Unit(s) IV Push every 6 hours PRN  influenza   Vaccine 0.5 milliLiter(s) IntraMuscular once      FAMILY HISTORY: No premature CAD.      Social History:  Smoking History: Smoked for several decades; quit ~8Y ago  Alcohol Use: Denies  Drug Use: Denies    REVIEW OF SYSTEMS:  CONSTITUTIONAL: No weakness, fevers or chills  EYES/ENT: No visual changes;  No dysphagia  NECK: No pain or stiffness  RESPIRATORY: +HORTA  CARDIOVASCULAR: +CP  GASTROINTESTINAL: No abdominal or epigastric pain. No nausea, vomiting, or hematemesis; No diarrhea or constipation. No melena or hematochezia.  BACK: No back pain  GENITOURINARY: No dysuria, frequency or hematuria  NEUROLOGICAL: No numbness or weakness  SKIN: No itching, burning, rashes, or lesions   All other review of systems is negative unless indicated above.    Physical Exam:  T(F): 98.1 (10-12), Max: 98.8 (10-11)  HR: 64 (10-12) (59 - 72)  BP: 178/98 (10-12) (159/97 - 187/100)  RR: 18 (10-12)  SpO2: 97% (10-12)  Gen: NAD. Pleasant.  HEENT: NCAT. PERRLA b/l.  Neck: No JVP elev.  CV: Normal S1, S2. RRR. No MRG. No TTP chest wall.  Chest: CTAB. No WRR.  Abd: +BSx4. Soft. NTND.  Ext: No LE edema.  Skin: No cyanosis.    Cardiovascular Diagnostic Testing:    ECG: Personally reviewed: RBBB. LAD. LAFB. LVH by voltage criteria. TWI in all precordial leads.    Imaging:    CXR: Personally reviewed. Unremarkable.    Labs: Personally reviewed                        13.3   7.07  )-----------( 264      ( 11 Oct 2019 20:20 )             40.9     10-11    143  |  105  |  21  ----------------------------<  126<H>  3.8   |  27  |  0.82    Ca    9.1      11 Oct 2019 20:20  Phos  2.6     10-11  Mg     2.4     10-11    TPro  7.5  /  Alb  4.2  /  TBili  0.3  /  DBili  x   /  AST  17  /  ALT  19  /  AlkPhos  74  10-11    PT/INR - ( 11 Oct 2019 22:06 )   PT: 12.3 sec;   INR: 1.08 ratio         PTT - ( 11 Oct 2019 22:06 )  PTT:33.4 sec    CARDIAC MARKERS ( 11 Oct 2019 22:06 )  49 ng/L / x     / x     / x     / x     / 6.7 ng/mL  CARDIAC MARKERS ( 11 Oct 2019 20:20 )  54 ng/L / x     / x     / x     / x     / 6.6 ng/mL ·	Chief Complaint: CP    HPI: 70 y/o M w/ no sig PMH, but is a former smoker.  Presented to ED c/o CP x 2D. Pt states that he was in his USOH, but woke up w/ chest discomfort. Pain is located in his L neck, L chest, and L shoulder/back/arm. Pain described as deep/sharp & severe in intensity. Pain is intermittent; it is exacerbated by exertion and relieved w/ rest. He reports associated dyspnea. He denies any associated nausea/diaphoresis. Pt has never had hx of CP. t baseline, his maximal ET was unltd. He denies any CP at time of my exam.      PMHx:   No pertinent past medical history      PSHx:  No significant past surgical history      Allergies:  No Known Allergies      Current Medications:   heparin  Infusion.  Unit(s)/Hr IV Continuous <Continuous>  heparin  Injectable 4700 Unit(s) IV Push every 6 hours PRN  influenza   Vaccine 0.5 milliLiter(s) IntraMuscular once      FAMILY HISTORY: No premature CAD.      Social History:  Smoking History: Smoked for several decades; quit ~8Y ago  Alcohol Use: Denies  Drug Use: Denies      REVIEW OF SYSTEMS:  CONSTITUTIONAL: No weakness, fevers or chills  EYES/ENT: No visual changes;  No dysphagia  NECK: No pain or stiffness  RESPIRATORY: +HORTA  CARDIOVASCULAR: +CP  GASTROINTESTINAL: No abdominal or epigastric pain. No nausea, vomiting, or hematemesis; No diarrhea or constipation. No melena or hematochezia.  BACK: No back pain  GENITOURINARY: No dysuria, frequency or hematuria  NEUROLOGICAL: No numbness or weakness  SKIN: No itching, burning, rashes, or lesions   All other review of systems is negative unless indicated above.    Physical Exam:  T(F): 98.1 (10-12), Max: 98.8 (10-11)  HR: 64 (10-12) (59 - 72)  BP: 178/98 (10-12) (159/97 - 187/100)  RR: 18 (10-12)  SpO2: 97% (10-12)    Gen: NAD. Pleasant.  HEENT: NCAT. PERRLA b/l.  Neck: No JVP elev.  CV: Normal S1, S2. RRR. No MRG. No TTP chest wall.  Chest: CTAB. No WRR.  Abd: +BSx4. Soft. NTND.  Ext: No LE edema.  Skin: No cyanosis.      ECG: SR  RBBB. LAD. LAFB. LVH by voltage criteria. TWI in all precordial leads.    CXR: Unremarkable.    Labs:                      13.3   7.07  )-----------( 264      ( 11 Oct 2019 20:20 )             40.9     10-11  143  |  105  |  21  ----------------------------<  126<H>  3.8   |  27  |  0.82    Ca    9.1      11 Oct 2019 20:20  Phos  2.6     10-11  Mg     2.4     10-11    TPro  7.5  /  Alb  4.2  /  TBili  0.3  /  DBili  x   /  AST  17  /  ALT  19  /  AlkPhos  74  10-11    PT/INR - ( 11 Oct 2019 22:06 )   PT: 12.3 sec;   INR: 1.08 ratio    PTT - ( 11 Oct 2019 22:06 )  PTT:33.4 sec    CARDIAC MARKERS ( 11 Oct 2019 22:06 )  49 ng/L / x     / x     / x     / x     / 6.7 ng/mL  CARDIAC MARKERS ( 11 Oct 2019 20:20 )  54 ng/L / x     / x     / x     / x     / 6.6 ng/mL ·	Chief Complaint: CP    HPI: 68 y/o M w/ no sig PMH, but is a former smoker.  Presented to ED c/o CP x 2D. Pt states that he was in his USOH, but woke up w/ chest discomfort. Pain is located in his L neck, L chest, and L shoulder/back/arm. Pain described as deep/sharp & severe in intensity. Pain is intermittent; it is exacerbated by exertion and relieved w/ rest. He reports associated dyspnea. He denies any associated nausea/diaphoresis. Pt has never had hx of CP. t baseline, his maximal ET was unltd. He denies any CP at time of my exam.      PMHx:   No pertinent past medical history      PSHx:  No significant past surgical history      Allergies:  No Known Allergies      Current Medications:   heparin  Infusion.  Unit(s)/Hr IV Continuous <Continuous>  heparin  Injectable 4700 Unit(s) IV Push every 6 hours PRN  influenza   Vaccine 0.5 milliLiter(s) IntraMuscular once      FAMILY HISTORY: No premature CAD.      Social History:  Smoking History: Smoked for several decades; quit ~8Y ago  Alcohol Use: Denies  Drug Use: Denies      REVIEW OF SYSTEMS:  CONSTITUTIONAL: No weakness, fevers or chills  EYES/ENT: No visual changes;  No dysphagia  NECK: No pain or stiffness  RESPIRATORY: +HORTA  CARDIOVASCULAR: +CP  GASTROINTESTINAL: No abdominal or epigastric pain. No nausea, vomiting, or hematemesis; No diarrhea or constipation. No melena or hematochezia.  BACK: No back pain  GENITOURINARY: No dysuria, frequency or hematuria  NEUROLOGICAL: No numbness or weakness  SKIN: No itching, burning, rashes, or lesions   All other review of systems is negative unless indicated above.    Physical Exam:  T(F): 98.1 (10-12), Max: 98.8 (10-11)  HR: 64 (10-12) (59 - 72)  BP: 178/98 (10-12) (159/97 - 187/100)  RR: 18 (10-12)  SpO2: 97% (10-12)    Gen: NAD. Pleasant.  HEENT: NCAT. PERRLA b/l.  Neck: No JVP elev.  CV: Normal S1, S2. RRR. No MRG. No TTP chest wall.  Chest: CTAB. No WRR.  Abd: +BSx4. Soft. NTND.  Ext: No LE edema.  Skin: No cyanosis.      ECG: SR  RBBB. LAD. LAFB. LVH by voltage criteria. TWI in all precordial leads.    CXR: Unremarkable.    Labs:                      13.3   7.07  )-----------( 264      ( 11 Oct 2019 20:20 )             40.9     10-11  143  |  105  |  21  ----------------------------<  126<H>  3.8   |  27  |  0.82    Ca    9.1      11 Oct 2019 20:20  Phos  2.6     10-11  Mg     2.4     10-11    TPro  7.5  /  Alb  4.2  /  TBili  0.3  /  DBili  x   /  AST  17  /  ALT  19  /  AlkPhos  74  10-11    PT/INR - ( 11 Oct 2019 22:06 )   PT: 12.3 sec;   INR: 1.08 ratio    PTT - ( 11 Oct 2019 22:06 )  PTT:33.4 sec    CARDIAC MARKERS ( 11 Oct 2019 22:06 )  49 ng/L / x     / x     / x     / x     / 6.7 ng/mL  CARDIAC MARKERS ( 11 Oct 2019 20:20 )  54 ng/L / x     / x     / x     / x     / 6.6 ng/mL

## 2019-10-12 NOTE — PROGRESS NOTE ADULT - PROBLEM SELECTOR PLAN 1
Discussed with on-call cardiology fellow Dr. Cedric Turner who informs of concern that patient may have unstable angina and will determine in am if patient is candidate for catheterization  - s/p asa/ticagrelor load on heparin infusion  - c/w maintenance asa and ticagrelor with heparin infusion. start atorvastatin 80d.  - given potassium chloride  - pending TTE  - pending lipid, HbA1c, TSH to risk stratify Discussed with Dr. Cadet from cardiology who said most likely cath Monday should pt remain stable  - s/p asa/ticagrelor load on heparin infusion  - c/w maintenance asa and ticagrelor with heparin infusion. start atorvastatin 80d.  - given potassium chloride  - pending TTE  - pending lipid, HbA1c, TSH to risk stratify Discussed with Dr. Cadet from cardiology who said most likely cath Monday should pt remain stable  - s/p asa/ticagrelor load on heparin infusion  - c/w maintenance asa and ticagrelor with heparin infusion. start atorvastatin 80d.  - given potassium chloride  - pending TTE  - HbA1C 6.2, TSH 3.36, lipid panel elevated cholesterol (244) & direct LDL (176) w/ other lipid panel values wnl Discussed with Dr. Cadet from cardiology who said most likely cath Monday should pt remain stable  - s/p asa/ticagrelor load on heparin infusion  - c/w maintenance asa and ticagrelor with heparin infusion. start atorvastatin 80d.  - pending TTE  - HbA1C 6.2, TSH 3.36, lipid panel elevated cholesterol (244) & direct LDL (176) w/ other lipid panel values wnl

## 2019-10-12 NOTE — PROGRESS NOTE ADULT - SUBJECTIVE AND OBJECTIVE BOX
CHEVY AMARAL  69y Male    Medicine Team 2  Mellissa Jimenez MS4  Pager [phone]    INTERVAL HPI/OVERNIGHT EVENTS: Pt currently has no complaints. He denies any headaches, fever, n/v, SOB, chest pain, difficulty lying flat, palpitations, or leg swelling. He states that he only has some shortness of breath when walking or doing chores that lasts about 10-15 minutes but resolves when he sits down. He denies taking any home medications and last saw a doctor in February of 2019 when he fell on his right knee. He believes his blood pressure would be lower if he were at home instead of in the hospital.       Vital Signs Last 24 Hrs  T(C): 36.7 (12 Oct 2019 07:59), Max: 37.1 (11 Oct 2019 22:55)  T(F): 98 (12 Oct 2019 07:59), Max: 98.8 (11 Oct 2019 22:55)  HR: 60 (12 Oct 2019 07:59) (54 - 72)  BP: 164/96 (12 Oct 2019 07:59) (156/74 - 187/100)  BP(mean): --  RR: 18 (12 Oct 2019 07:59) (14 - 18)  SpO2: 98% (12 Oct 2019 07:59) (94% - 98%)      PHYSICAL EXAM:  GENERAL: NAD, well-groomed, well-developed  HEAD:  Atraumatic, Normocephalic  EYES: EOMI, PERRLA, conjunctiva and sclera clear  ENMT: Moist mucous membranes, Missing some teeth  NECK: Supple, No JVD, Normal thyroid  NERVOUS SYSTEM:  Alert & Oriented X3, Good concentration; Motor Strength 5/5 B/L upper and lower extremities  CHEST/LUNG: Clear to percussion bilaterally; No rales, rhonchi, wheezing, or rubs  HEART: Regular rate and rhythm; No murmurs, rubs, or gallops  ABDOMEN: Soft, Nontender, Nondistended; Bowel sounds present  EXTREMITIES:  2+ Peripheral Pulses, No clubbing, cyanosis, or edema  LYMPH: No lymphadenopathy noted  SKIN: No rashes or lesions    Consultant(s) Notes Reviewed:  [x ] YES  [ ] NO  Care Discussed with Consultants/Other Providers [ x] YES  [ ] NO    LABS:                        14.3   6.56  )-----------( 269      ( 12 Oct 2019 04:56 )             42.9     10-12    139  |  103  |  13  ----------------------------<  107<H>  3.2<L>   |  23  |  0.57    Ca    8.9      12 Oct 2019 04:56  Phos  2.9     10-12  Mg     2.2     10-12    TPro  7.5  /  Alb  4.2  /  TBili  0.3  /  DBili  x   /  AST  17  /  ALT  19  /  AlkPhos  74  10-11    PT/INR - ( 11 Oct 2019 22:06 )   PT: 12.3 sec;   INR: 1.08 ratio         PTT - ( 12 Oct 2019 04:56 )  PTT:83.8 sec        RADIOLOGY & ADDITIONAL TESTS:  CXR PA/LAT 10/11 20:05:   FINDINGS: No acute osseous antibodies. The trachea is midline. The heart is enlarged. The lungs are clear bilaterally. There is no pleural effusion or pneumothorax.  IMPRESSION: Clear lungs. Cardiomegaly.    Imaging Personally Reviewed:  [X] YES  [ ] NO      MEDICATIONS  (STANDING):  aspirin enteric coated 81 milliGRAM(s) Oral daily  atorvastatin 80 milliGRAM(s) Oral at bedtime  heparin  Infusion.  Unit(s)/Hr (9.5 mL/Hr) IV Continuous <Continuous>  influenza   Vaccine 0.5 milliLiter(s) IntraMuscular once  lisinopril 5 milliGRAM(s) Oral daily  metoprolol tartrate 12.5 milliGRAM(s) Oral two times a day  potassium chloride    Tablet ER 40 milliEquivalent(s) Oral every 4 hours  ticagrelor 90 milliGRAM(s) Oral every 12 hours    MEDICATIONS  (PRN):  heparin  Injectable 4700 Unit(s) IV Push every 6 hours PRN For aPTT less than 40 CHEVY AMARAL  69y Male    Medicine Team 2  Mellissa Jimenez MS4  Pager [phone]    INTERVAL HPI/OVERNIGHT EVENTS: Pt currently has no complaints. He denies any headaches, fever, n/v, SOB, chest pain, difficulty lying flat, palpitations, or leg swelling. He states that he only has some shortness of breath when walking or doing chores that lasts about 10-15 minutes but resolves when he sits down. He denies taking any home medications and last saw a doctor in February of 2019 when he fell on his right knee. He believes his blood pressure would be lower if he were at home instead of in the hospital.       Vital Signs Last 24 Hrs  T(C): 36.7 (12 Oct 2019 07:59), Max: 37.1 (11 Oct 2019 22:55)  T(F): 98 (12 Oct 2019 07:59), Max: 98.8 (11 Oct 2019 22:55)  HR: 60 (12 Oct 2019 07:59) (54 - 72)  BP: 164/96 (12 Oct 2019 07:59) (156/74 - 187/100)  BP(mean): --  RR: 18 (12 Oct 2019 07:59) (14 - 18)  SpO2: 98% (12 Oct 2019 07:59) (94% - 98%)      PHYSICAL EXAM:  GENERAL: NAD, well-groomed, well-developed  HEAD:  Atraumatic, Normocephalic  EYES: EOMI, PERRLA, conjunctiva and sclera clear  ENMT: Moist mucous membranes, Missing some teeth  NECK: Supple, No JVD, Normal thyroid  NERVOUS SYSTEM:  Alert & Oriented X3, Good concentration; Motor Strength 5/5 B/L upper and lower extremities  CHEST/LUNG: Clear to percussion bilaterally; No rales, rhonchi, wheezing, or rubs  HEART: Regular rate and rhythm; No murmurs, rubs, or gallops  ABDOMEN: Soft, Nontender, Nondistended; Bowel sounds present  EXTREMITIES:  2+ Peripheral Pulses, No clubbing, cyanosis, or edema  LYMPH: No lymphadenopathy noted  SKIN: No rashes or lesions    Consultant(s) Notes Reviewed:  [x ] YES  [ ] NO  Care Discussed with Consultants/Other Providers [ x] YES  [ ] NO      LABS:                        14.3   7.03  )-----------( 268      ( 12 Oct 2019 10:58 )             43.9     10-12    139  |  103  |  13  ----------------------------<  107<H>  3.2<L>   |  23  |  0.57    Ca    8.9      12 Oct 2019 04:56  Phos  2.9     10-12  Mg     2.2     10-12    TPro  7.5  /  Alb  4.2  /  TBili  0.3  /  DBili  x   /  AST  17  /  ALT  19  /  AlkPhos  74  10-11    PT/INR - ( 11 Oct 2019 22:06 )   PT: 12.3 sec;   INR: 1.08 ratio         PTT - ( 12 Oct 2019 04:56 )  PTT:83.8 sec        RADIOLOGY & ADDITIONAL TESTS:  CXR PA/LAT 10/11 20:05:   FINDINGS: No acute osseous antibodies. The trachea is midline. The heart is enlarged. The lungs are clear bilaterally. There is no pleural effusion or pneumothorax.  IMPRESSION: Clear lungs. Cardiomegaly.    Imaging Personally Reviewed:  [X] YES  [ ] NO      MEDICATIONS  (STANDING):  aspirin enteric coated 81 milliGRAM(s) Oral daily  atorvastatin 80 milliGRAM(s) Oral at bedtime  heparin  Infusion.  Unit(s)/Hr (9.5 mL/Hr) IV Continuous <Continuous>  influenza   Vaccine 0.5 milliLiter(s) IntraMuscular once  lisinopril 5 milliGRAM(s) Oral daily  metoprolol tartrate 12.5 milliGRAM(s) Oral two times a day  potassium chloride    Tablet ER 40 milliEquivalent(s) Oral every 4 hours  ticagrelor 90 milliGRAM(s) Oral every 12 hours    MEDICATIONS  (PRN):  heparin  Injectable 4700 Unit(s) IV Push every 6 hours PRN For aPTT less than 40 CHEVY AMARAL  69y Male    Medicine Team 2  Mellissa Jimenez MS4  Pager [phone]    INTERVAL HPI/OVERNIGHT EVENTS: Pt currently has no complaints. He denies any headaches, fever, n/v, SOB, chest pain, difficulty lying flat, palpitations, or leg swelling. He states that he only has some shortness of breath when walking or doing chores that lasts about 10-15 minutes but resolves when he sits down. He denies taking any home medications and last saw a doctor in February of 2019 when he fell on his right knee. He believes his blood pressure would be lower if he were at home instead of in the hospital.       Vital Signs Last 24 Hrs  T(C): 36.7 (12 Oct 2019 07:59), Max: 37.1 (11 Oct 2019 22:55)  T(F): 98 (12 Oct 2019 07:59), Max: 98.8 (11 Oct 2019 22:55)  HR: 60 (12 Oct 2019 07:59) (54 - 72)  BP: 164/96 (12 Oct 2019 07:59) (156/74 - 187/100)  BP(mean): --  RR: 18 (12 Oct 2019 07:59) (14 - 18)  SpO2: 98% (12 Oct 2019 07:59) (94% - 98%)      PHYSICAL EXAM:  GENERAL: NAD, well-groomed, well-developed  HEAD:  Atraumatic, Normocephalic  EYES: EOMI, PERRLA, conjunctiva and sclera clear  ENMT: Moist mucous membranes, Missing some teeth  NECK: Supple, No JVD, Normal thyroid  NERVOUS SYSTEM:  Alert & Oriented X3, Good concentration; Motor Strength 5/5 B/L upper and lower extremities  CHEST/LUNG: Clear to percussion bilaterally; No rales, rhonchi, wheezing, or rubs  HEART: Regular rate and rhythm; No murmurs, rubs, or gallops  ABDOMEN: Soft, Nontender, Nondistended; Bowel sounds present  EXTREMITIES:  2+ Peripheral Pulses, No clubbing, cyanosis, or edema  LYMPH: No lymphadenopathy noted  SKIN: No rashes or lesions    Consultant(s) Notes Reviewed:  [x ] YES  [ ] NO  Care Discussed with Consultants/Other Providers [ x] YES  [ ] NO      LABS:                        14.3   7.03  )-----------( 268      ( 12 Oct 2019 10:58 )             43.9     10-12    139  |  103  |  13  ----------------------------<  107<H>  3.2<L>   |  23  |  0.57    Ca    8.9      12 Oct 2019 04:56  Phos  2.9     10-12  Mg     2.2     10-12    TPro  7.5  /  Alb  4.2  /  TBili  0.3  /  DBili  x   /  AST  17  /  ALT  19  /  AlkPhos  74  10-11    PT/INR - ( 11 Oct 2019 22:06 )   PT: 12.3 sec;   INR: 1.08 ratio         PTT - ( 12 Oct 2019 04:56 )  PTT:83.8 sec    10/12 11:40  - TSH 3.36  - Cholesterol, serum: 244  - Triglycerides, serum: 99  - HDL Cholesterol, serum: 48  - Direct LDL: 176  - Total cholesterol/HDL ratio measurement: 5.1      RADIOLOGY & ADDITIONAL TESTS:  CXR PA/LAT 10/11 20:05:   FINDINGS: No acute osseous antibodies. The trachea is midline. The heart is enlarged. The lungs are clear bilaterally. There is no pleural effusion or pneumothorax.  IMPRESSION: Clear lungs. Cardiomegaly.    Imaging Personally Reviewed:  [X] YES  [ ] NO      MEDICATIONS  (STANDING):  aspirin enteric coated 81 milliGRAM(s) Oral daily  atorvastatin 80 milliGRAM(s) Oral at bedtime  heparin  Infusion.  Unit(s)/Hr (9.5 mL/Hr) IV Continuous <Continuous>  influenza   Vaccine 0.5 milliLiter(s) IntraMuscular once  lisinopril 5 milliGRAM(s) Oral daily  metoprolol tartrate 12.5 milliGRAM(s) Oral two times a day  potassium chloride    Tablet ER 40 milliEquivalent(s) Oral every 4 hours  ticagrelor 90 milliGRAM(s) Oral every 12 hours    MEDICATIONS  (PRN):  heparin  Injectable 4700 Unit(s) IV Push every 6 hours PRN For aPTT less than 40

## 2019-10-13 LAB
ANION GAP SERPL CALC-SCNC: 12 MMOL/L — SIGNIFICANT CHANGE UP (ref 5–17)
APTT BLD: 57.3 SEC — HIGH (ref 27.5–36.3)
APTT BLD: 59.4 SEC — HIGH (ref 27.5–36.3)
APTT BLD: 73.5 SEC — HIGH (ref 27.5–36.3)
BLD GP AB SCN SERPL QL: NEGATIVE — SIGNIFICANT CHANGE UP
BUN SERPL-MCNC: 22 MG/DL — SIGNIFICANT CHANGE UP (ref 7–23)
CALCIUM SERPL-MCNC: 9.2 MG/DL — SIGNIFICANT CHANGE UP (ref 8.4–10.5)
CHLORIDE SERPL-SCNC: 107 MMOL/L — SIGNIFICANT CHANGE UP (ref 96–108)
CO2 SERPL-SCNC: 20 MMOL/L — LOW (ref 22–31)
CREAT SERPL-MCNC: 0.72 MG/DL — SIGNIFICANT CHANGE UP (ref 0.5–1.3)
GLUCOSE SERPL-MCNC: 106 MG/DL — HIGH (ref 70–99)
HCT VFR BLD CALC: 41 % — SIGNIFICANT CHANGE UP (ref 39–50)
HCV AB S/CO SERPL IA: 0.21 S/CO — SIGNIFICANT CHANGE UP (ref 0–0.99)
HCV AB SERPL-IMP: SIGNIFICANT CHANGE UP
HGB BLD-MCNC: 13.9 G/DL — SIGNIFICANT CHANGE UP (ref 13–17)
MAGNESIUM SERPL-MCNC: 2.2 MG/DL — SIGNIFICANT CHANGE UP (ref 1.6–2.6)
MCHC RBC-ENTMCNC: 29.3 PG — SIGNIFICANT CHANGE UP (ref 27–34)
MCHC RBC-ENTMCNC: 33.9 GM/DL — SIGNIFICANT CHANGE UP (ref 32–36)
MCV RBC AUTO: 86.3 FL — SIGNIFICANT CHANGE UP (ref 80–100)
NRBC # BLD: 0 /100 WBCS — SIGNIFICANT CHANGE UP (ref 0–0)
PHOSPHATE SERPL-MCNC: 3.6 MG/DL — SIGNIFICANT CHANGE UP (ref 2.5–4.5)
PLATELET # BLD AUTO: 272 K/UL — SIGNIFICANT CHANGE UP (ref 150–400)
POTASSIUM SERPL-MCNC: 4.2 MMOL/L — SIGNIFICANT CHANGE UP (ref 3.5–5.3)
POTASSIUM SERPL-SCNC: 4.2 MMOL/L — SIGNIFICANT CHANGE UP (ref 3.5–5.3)
RBC # BLD: 4.75 M/UL — SIGNIFICANT CHANGE UP (ref 4.2–5.8)
RBC # FLD: 13.6 % — SIGNIFICANT CHANGE UP (ref 10.3–14.5)
RH IG SCN BLD-IMP: POSITIVE — SIGNIFICANT CHANGE UP
SODIUM SERPL-SCNC: 139 MMOL/L — SIGNIFICANT CHANGE UP (ref 135–145)
WBC # BLD: 6.7 K/UL — SIGNIFICANT CHANGE UP (ref 3.8–10.5)
WBC # FLD AUTO: 6.7 K/UL — SIGNIFICANT CHANGE UP (ref 3.8–10.5)

## 2019-10-13 PROCEDURE — 93010 ELECTROCARDIOGRAM REPORT: CPT

## 2019-10-13 PROCEDURE — 93306 TTE W/DOPPLER COMPLETE: CPT | Mod: 26

## 2019-10-13 PROCEDURE — 99233 SBSQ HOSP IP/OBS HIGH 50: CPT | Mod: GC

## 2019-10-13 RX ADMIN — TICAGRELOR 90 MILLIGRAM(S): 90 TABLET ORAL at 05:56

## 2019-10-13 RX ADMIN — HEPARIN SODIUM 850 UNIT(S)/HR: 5000 INJECTION INTRAVENOUS; SUBCUTANEOUS at 11:14

## 2019-10-13 RX ADMIN — ATORVASTATIN CALCIUM 80 MILLIGRAM(S): 80 TABLET, FILM COATED ORAL at 22:04

## 2019-10-13 RX ADMIN — TICAGRELOR 90 MILLIGRAM(S): 90 TABLET ORAL at 17:43

## 2019-10-13 RX ADMIN — LISINOPRIL 10 MILLIGRAM(S): 2.5 TABLET ORAL at 06:00

## 2019-10-13 RX ADMIN — Medication 81 MILLIGRAM(S): at 11:14

## 2019-10-13 RX ADMIN — Medication 12.5 MILLIGRAM(S): at 17:43

## 2019-10-13 RX ADMIN — Medication 12.5 MILLIGRAM(S): at 05:56

## 2019-10-13 RX ADMIN — HEPARIN SODIUM 850 UNIT(S)/HR: 5000 INJECTION INTRAVENOUS; SUBCUTANEOUS at 04:28

## 2019-10-13 RX ADMIN — HEPARIN SODIUM 850 UNIT(S)/HR: 5000 INJECTION INTRAVENOUS; SUBCUTANEOUS at 17:43

## 2019-10-13 NOTE — PROGRESS NOTE ADULT - ASSESSMENT
69M [place] w/ former smoker (quit 8 years ago; 1/2 PPD for 25 years), impaired glucose tolerance, HTN, HLD presents to [facility] for chest pain

## 2019-10-13 NOTE — PROGRESS NOTE ADULT - PROBLEM SELECTOR PLAN 4
- elevated cholesterol and direct LDL w/ rest of lipid panel values wnl  - c/w atorvastatin 80mg qhs

## 2019-10-13 NOTE — PROGRESS NOTE ADULT - PROBLEM SELECTOR PLAN 1
Per cardiology, plan for [facility] 10/14  - appreciate cardiology recs  - c/w asa 81mg qd, Brilinta 90mg BID, atorvastatin 80mg qhs  - c/w metoprolol tartrate 12.5mg PO BID, lisinopril 10mg qd  - c/w heparin gtt x 48 hrs  - pending TTE

## 2019-10-13 NOTE — PROGRESS NOTE ADULT - SUBJECTIVE AND OBJECTIVE BOX
Patient seen and examined at bedside.    Overnight Events: 3 sec of PAT. This am feels well but noted fatigue after brushing his teeth. Otherwise has neck pain but no chest pain or SOB.        Current Meds:  aspirin enteric coated 81 milliGRAM(s) Oral daily  atorvastatin 80 milliGRAM(s) Oral at bedtime  heparin  Infusion.  Unit(s)/Hr IV Continuous <Continuous>  heparin  Injectable 4700 Unit(s) IV Push every 6 hours PRN  influenza   Vaccine 0.5 milliLiter(s) IntraMuscular once  lisinopril 10 milliGRAM(s) Oral daily  metoprolol tartrate 12.5 milliGRAM(s) Oral two times a day  ticagrelor 90 milliGRAM(s) Oral every 12 hours      Vitals:  T(F): 97.7 (10-13), Max: 98.4 (10-12)  HR: 55 (10-13) (55 - 66)  BP: 146/74 (10-13) (129/71 - 162/90)  RR: 18 (10-13)  SpO2: 97% (10-13)  I&O's Summary    12 Oct 2019 07:01  -  13 Oct 2019 07:00  --------------------------------------------------------  IN: 1016 mL / OUT: 0 mL / NET: 1016 mL        Physical Exam:  Appearance: No acute distress; well appearing  HEENT:  EOMI, sclera anicteric, Normal oral mucosa  Cardiovascular: RRR, S1, S2, no murmurs, rubs, or gallops; no edema; no JVD  Respiratory: Clear to auscultation bilaterally, no wheezes, rales, rhonchi  Gastrointestinal: soft, non-tender, non-distended with normal bowel sounds  Musculoskeletal: No clubbing; no joint deformity   Neurologic: Non-focal  Lymphatic: No lymphadenopathy  Psychiatry: AAOx3, mood & affect appropriate  Skin: No rashes, ecchymoses, or cyanosis                          13.9   6.70  )-----------( 272      ( 13 Oct 2019 03:59 )             41.0     10-13    139  |  107  |  22  ----------------------------<  106<H>  4.2   |  20<L>  |  0.72    Ca    9.2      13 Oct 2019 03:59  Phos  3.6     10  Mg     2.2     10-13    TPro  7.5  /  Alb  4.2  /  TBili  0.3  /  DBili  x   /  AST  17  /  ALT  19  /  AlkPhos  74  10-11    PT/INR - ( 11 Oct 2019 22:06 )   PT: 12.3 sec;   INR: 1.08 ratio         PTT - ( 13 Oct 2019 03:59 )  PTT:73.5 sec  CARDIAC MARKERS ( 12 Oct 2019 01:34 )  44 ng/L / x     / x     / x     / x     / x      CARDIAC MARKERS ( 11 Oct 2019 22:06 )  49 ng/L / x     / x     / x     / x     / 6.7 ng/mL  CARDIAC MARKERS ( 11 Oct 2019 20:20 )  54 ng/L / x     / x     / x     / x     / 6.6 ng/mL        Total Cholesterol: 244  LDL: 176  HDL: 48  T    Hemoglobin A1C, Whole Blood: 6.2 % (10-12 @ 10:58)    Interpretation of Telemetry: sinus 60s-80s, PAT for 3 seconds For all Cardiology service contact information, go to [email or web address] and use [email or web address]" to login.      Overnight Events: 3 sec of PAT. This am feels well but noted fatigue after brushing his teeth. Otherwise has neck pain but no chest pain or SOB.        Current Meds:  aspirin enteric coated 81 milliGRAM(s) Oral daily  atorvastatin 80 milliGRAM(s) Oral at bedtime  heparin  Infusion.  Unit(s)/Hr IV Continuous <Continuous>  heparin  Injectable 4700 Unit(s) IV Push every 6 hours PRN  influenza   Vaccine 0.5 milliLiter(s) IntraMuscular once  lisinopril 10 milliGRAM(s) Oral daily  metoprolol tartrate 12.5 milliGRAM(s) Oral two times a day  ticagrelor 90 milliGRAM(s) Oral every 12 hours      Vitals:  T(F): 97.7 (10-), Max: 98.4 (10-12)  HR: 55 (10-13) (55 - 66)  BP: 146/74 (10-) (129/71 - 162/90)  RR: 18 (10-13)  SpO2: 97% (10-13)    Physical Exam:  Appearance: No acute distress; well appearing  HEENT:  EOMI, sclera anicteric, Normal oral mucosa  Cardiovascular: RRR, S1, S2, no murmurs, rubs, or gallops; no edema; no JVD  Respiratory: Clear to auscultation bilaterally, no wheezes, rales, rhonchi  Gastrointestinal: soft, non-tender, non-distended with normal bowel sounds  Musculoskeletal: No clubbing; no joint deformity   Neurologic: Non-focal  Lymphatic: No lymphadenopathy  Psychiatry: AAOx3, mood & affect appropriate  Skin: No rashes, ecchymoses, or cyanosis      LABS:                      13.9   6.70  )-----------( 272      ( 13 Oct 2019 03:59 )             41.0     10-  139  |  107  |  22  ----------------------------<  106<H>  4.2   |  20<L>  |  0.72    Ca    9.2      13 Oct 2019 03:59  Phos  3.6     10  Mg     2.2     10-13    TPro  7.5  /  Alb  4.2  /  TBili  0.3  /  DBili  x   /  AST  17  /  ALT  19  /  AlkPhos  74  10-11    PT/INR - ( 11 Oct 2019 22:06 )   PT: 12.3 sec;   INR: 1.08 ratio    PTT - ( 13 Oct 2019 03:59 )  PTT:73.5 sec    CARDIAC MARKERS ( 12 Oct 2019 01:34 )  44 ng/L / x     / x     / x     / x     / x      CARDIAC MARKERS ( 11 Oct 2019 22:06 )  49 ng/L / x     / x     / x     / x     / 6.7 ng/mL  CARDIAC MARKERS ( 11 Oct 2019 20:20 )  54 ng/L / x     / x     / x     / x     / 6.6 ng/mL    Total Cholesterol: 244  LDL: 176  HDL: 48  T    Hemoglobin A1C, Whole Blood: 6.2 % (10-12 @ 10:58)      Interpretation of Telemetry: sinus 60s-80s, PAT for 3 seconds

## 2019-10-13 NOTE — PROGRESS NOTE ADULT - SUBJECTIVE AND OBJECTIVE BOX
CHEVY AMARAL  69y Male    ************************************  Medicine Team 2  Mellissa Jimenez, MS4  Pager [phone]  ************************************    INTERVAL HPI/OVERNIGHT EVENTS: Pt states that he is eating and sleeping well. He states that when he was brushing his teeth this AM, he felt some shortness of breath and fatigue w/o associated chest pain. He says that he just stood in place and the pain lasted about 2-3 minutes. He denies any other episodes of shortness of breath or fatigue. He continues to endorse a 5/10 left sided neck/shoulder pain that has been present since Wednesday. He states that putting pressure on the area causes the pain to be an 8/10. He reports having regular bowel movements. He continues to deny chest pain, headaches, fever, n/v, difficulty lying flat, or palpitations.       Vital Signs Last 24 Hrs  T(C): 36.5 (13 Oct 2019 07:55), Max: 36.9 (12 Oct 2019 12:04)  T(F): 97.7 (13 Oct 2019 07:55), Max: 98.4 (12 Oct 2019 12:04)  HR: 55 (13 Oct 2019 07:55) (55 - 66)  BP: 146/74 (13 Oct 2019 07:55) (129/71 - 162/90)  BP(mean): --  RR: 18 (13 Oct 2019 07:55) (17 - 18)  SpO2: 97% (13 Oct 2019 07:55) (97% - 98%)      PHYSICAL EXAM:  GENERAL: NAD, well-groomed, well-developed  HEAD:  Atraumatic, Normocephalic  EYES: EOMI, conjunctiva and sclera clear  ENMT: Moist mucous membranes, Missing some teeth  NECK: Supple, No JVD, Normal thyroid  NERVOUS SYSTEM:  Alert & Oriented X3, no focal deficits  CHEST/LUNG: Clear to percussion bilaterally; No rales, rhonchi, wheezing, or rubs  HEART: Regular rate and rhythm; No murmurs, rubs, or gallops  ABDOMEN: Soft, Nontender, Nondistended; Bowel sounds present  EXTREMITIES:  2+ Peripheral Pulses, No clubbing, cyanosis, or edema  SKIN: No rashes or lesions    Consultant(s) Notes Reviewed:  [x ] YES  [ ] NO  Care Discussed with Consultants/Other Providers [ x] YES  [ ] NO        LABS:                        13.9   6.70  )-----------( 272      ( 13 Oct 2019 03:59 )             41.0     10-13    139  |  107  |  22  ----------------------------<  106<H>  4.2   |  20<L>  |  0.72    Ca    9.2      13 Oct 2019 03:59  Phos  3.6     10-13  Mg     2.2     10-13    TPro  7.5  /  Alb  4.2  /  TBili  0.3  /  DBili  x   /  AST  17  /  ALT  19  /  AlkPhos  74  10-11    PT/INR - ( 11 Oct 2019 22:06 )   PT: 12.3 sec;   INR: 1.08 ratio         PTT - ( 13 Oct 2019 03:59 )  PTT:73.5 sec        10/12 11:40  - TSH 3.36  - Cholesterol, serum: 244  - Triglycerides, serum: 99  - HDL Cholesterol, serum: 48  - Direct LDL: 176  - Total cholesterol/HDL ratio measurement: 5.1      RADIOLOGY & ADDITIONAL TESTS:  CXR PA/LAT 10/11 20:05:   FINDINGS: No acute osseous antibodies. The trachea is midline. The heart is enlarged. The lungs are clear bilaterally. There is no pleural effusion or pneumothorax.  IMPRESSION: Clear lungs. Cardiomegaly.    Imaging Personally Reviewed:  [X] YES  [ ] NO      MEDICATIONS  (STANDING):  aspirin enteric coated 81 milliGRAM(s) Oral daily  atorvastatin 80 milliGRAM(s) Oral at bedtime  heparin  Infusion.  Unit(s)/Hr (9.5 mL/Hr) IV Continuous <Continuous>  influenza   Vaccine 0.5 milliLiter(s) IntraMuscular once  lisinopril 10 milliGRAM(s) Oral daily  metoprolol tartrate 12.5 milliGRAM(s) Oral two times a day  ticagrelor 90 milliGRAM(s) Oral every 12 hours    MEDICATIONS  (PRN):  heparin  Injectable 4700 Unit(s) IV Push every 6 hours PRN For aPTT less than 40

## 2019-10-13 NOTE — PROGRESS NOTE ADULT - ASSESSMENT
68 y/o M w/ no sig PMH but is a former smoker c/o CP x 2D.      #Unstable angina  - continue ASA 81mg daily, Brilinta 90mg BID, atorvastatin 80mg qhs  - continue hep gtt x 48 hours  - continue metoprolol tartrate 12.5mg PO BID, lisinopril 10mg daily  - TTE  - plan for [facility] 10/14      Irvin Esparza MD  Cardiology Fellow  [phone]  All Cardiology service information can be found 24/7 on [email or web address], password: [email or web address] 68 y/o M w/ no sig PMH but is a former smoker c/o CP x 2D.      #Unstable angina  - continue ASA 81mg daily, Brilinta 90mg BID, atorvastatin 80mg qhs  - continue hep gtt x 48 hours  - continue metoprolol tartrate 12.5mg PO BID, lisinopril 10mg daily  - TTE  - plan for [facility] 10/14      Irvin Esparza MD  Cardiology Fellow  [phone]    Alejandro Cadet M.D.  Cardiology Attending, Consult Service  [phone] or [phone]     For all Cardiology service contact information, go to [email or web address] and use "[email or web address]" to login.

## 2019-10-14 ENCOUNTER — TRANSCRIPTION ENCOUNTER (OUTPATIENT)
Age: 69
End: 2019-10-14

## 2019-10-14 LAB
ANION GAP SERPL CALC-SCNC: 14 MMOL/L — SIGNIFICANT CHANGE UP (ref 5–17)
APTT BLD: 47 SEC — HIGH (ref 27.5–36.3)
BUN SERPL-MCNC: 28 MG/DL — HIGH (ref 7–23)
CALCIUM SERPL-MCNC: 8.9 MG/DL — SIGNIFICANT CHANGE UP (ref 8.4–10.5)
CHLORIDE SERPL-SCNC: 106 MMOL/L — SIGNIFICANT CHANGE UP (ref 96–108)
CO2 SERPL-SCNC: 22 MMOL/L — SIGNIFICANT CHANGE UP (ref 22–31)
CREAT SERPL-MCNC: 0.83 MG/DL — SIGNIFICANT CHANGE UP (ref 0.5–1.3)
GLUCOSE SERPL-MCNC: 102 MG/DL — HIGH (ref 70–99)
HCT VFR BLD CALC: 42.2 % — SIGNIFICANT CHANGE UP (ref 39–50)
HGB BLD-MCNC: 13.7 G/DL — SIGNIFICANT CHANGE UP (ref 13–17)
MAGNESIUM SERPL-MCNC: 2.2 MG/DL — SIGNIFICANT CHANGE UP (ref 1.6–2.6)
MCHC RBC-ENTMCNC: 28.7 PG — SIGNIFICANT CHANGE UP (ref 27–34)
MCHC RBC-ENTMCNC: 32.5 GM/DL — SIGNIFICANT CHANGE UP (ref 32–36)
MCV RBC AUTO: 88.3 FL — SIGNIFICANT CHANGE UP (ref 80–100)
PHOSPHATE SERPL-MCNC: 4.2 MG/DL — SIGNIFICANT CHANGE UP (ref 2.5–4.5)
PLATELET # BLD AUTO: 269 K/UL — SIGNIFICANT CHANGE UP (ref 150–400)
POTASSIUM SERPL-MCNC: 3.8 MMOL/L — SIGNIFICANT CHANGE UP (ref 3.5–5.3)
POTASSIUM SERPL-SCNC: 3.8 MMOL/L — SIGNIFICANT CHANGE UP (ref 3.5–5.3)
RBC # BLD: 4.78 M/UL — SIGNIFICANT CHANGE UP (ref 4.2–5.8)
RBC # FLD: 13.9 % — SIGNIFICANT CHANGE UP (ref 10.3–14.5)
SODIUM SERPL-SCNC: 142 MMOL/L — SIGNIFICANT CHANGE UP (ref 135–145)
WBC # BLD: 6.79 K/UL — SIGNIFICANT CHANGE UP (ref 3.8–10.5)
WBC # FLD AUTO: 6.79 K/UL — SIGNIFICANT CHANGE UP (ref 3.8–10.5)

## 2019-10-14 PROCEDURE — 99233 SBSQ HOSP IP/OBS HIGH 50: CPT | Mod: GC

## 2019-10-14 PROCEDURE — 93456 R HRT CORONARY ARTERY ANGIO: CPT | Mod: 26,GC

## 2019-10-14 PROCEDURE — 99152 MOD SED SAME PHYS/QHP 5/>YRS: CPT | Mod: GC

## 2019-10-14 RX ADMIN — ATORVASTATIN CALCIUM 80 MILLIGRAM(S): 80 TABLET, FILM COATED ORAL at 21:29

## 2019-10-14 RX ADMIN — HEPARIN SODIUM 1000 UNIT(S)/HR: 5000 INJECTION INTRAVENOUS; SUBCUTANEOUS at 10:02

## 2019-10-14 RX ADMIN — LISINOPRIL 10 MILLIGRAM(S): 2.5 TABLET ORAL at 05:17

## 2019-10-14 RX ADMIN — Medication 81 MILLIGRAM(S): at 09:01

## 2019-10-14 RX ADMIN — Medication 12.5 MILLIGRAM(S): at 05:17

## 2019-10-14 RX ADMIN — TICAGRELOR 90 MILLIGRAM(S): 90 TABLET ORAL at 05:16

## 2019-10-14 RX ADMIN — Medication 12.5 MILLIGRAM(S): at 21:29

## 2019-10-14 NOTE — DISCHARGE NOTE PROVIDER - HOSPITAL COURSE
Mr. Oropeza is a 69 year old man with a past medical history of hypertension on no home medications who formerly smoked 1/2 packs of cigarettes a day for 25 years but quit 8 years ago who presented with chest pain. He first developed left neck/shoulder pain. About 3 days later, he had chest pain that radiated down his left arm. The chest pain was severe, sharp, intermittent, occasionally occurred at rest, and was associated with shortness of breath and diaphoresis. On presentation, he was also found to be hypertensive. He was given medication for his presumed unstable angina and medication to control his blood pressure. A chest x-ray revealed clear lungs and cardiomegaly. TSH, lipid panel, and HbA1C were sent for risk stratification. TSH returned within normal limits, his only lipid panel abnormalities were elevated cholesterol and direct LDL cholesterol, and his HbA1C revealed pre-diabetes at 6.2. He was counseled on making lifestyle modifications as well as the importance of regular outpatient follow up with a primary care physician. 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 He underwent cardiac catheterization*****. Mr. Oropeza is a 69 year old man with a past medical history of hypertension on no home medications who formerly smoked 1/2 packs of cigarettes a day for 25 years but quit 8 years ago who presented with chest pain. He first developed left neck/shoulder pain. About 3 days later, he had chest pain that radiated down his left arm. The chest pain was severe, sharp, intermittent, occasionally occurred at rest, and was associated with shortness of breath and diaphoresis. On presentation, he was also found to be hypertensive. He was given medication for his presumed unstable angina and medication to control his blood pressure. A chest x-ray revealed clear lungs and cardiomegaly. TSH, lipid panel, and HbA1C were sent for risk stratification. TSH returned within normal limits, his only lipid panel abnormalities were elevated cholesterol and direct LDL cholesterol, and his HbA1C revealed pre-diabetes at 6.2. He was counseled on making lifestyle modifications as well as the importance of regular outpatient follow up with a primary care physician. 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 He underwent cardiac catheterization, which showed 99% LAD stenosis, 95% LCx stenosis, and 100% RCA stenosis. He was scheduled for a CABG. He underwent the CABG.......... Mr. Oropeza is a 69 year old man with a past medical history of hypertension on no home medications who formerly smoked 1/2 packs of cigarettes a day for 25 years but quit 8 years ago who presented with chest pain. He first developed left neck/shoulder pain. About 3 days later, he had chest pain that radiated down his left arm. The chest pain was severe, sharp, intermittent, occasionally occurred at rest, and was associated with shortness of breath and diaphoresis. On presentation, he was also found to be hypertensive. He was given medication for his presumed unstable angina and medication to control his blood pressure. A chest x-ray revealed clear lungs and cardiomegaly. TSH, lipid panel, and HbA1C were sent for risk stratification. TSH returned within normal limits, his only lipid panel abnormalities were elevated cholesterol and direct LDL cholesterol, and his HbA1C revealed pre-diabetes at 6.2. He was counseled on making lifestyle modifications as well as the importance of regular outpatient follow up with a primary care physician. 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 He underwent cardiac catheterization, which showed 99% LAD stenosis, 95% LCx stenosis, and 100% RCA stenosis. He was scheduled for a CABG. He underwent the CABG..........        Assessment and Plan:     · Assessment        68 y/o male former smoker, with PMHx of HTN, HLD who presents to [facility] with c/o chest pain x3 days radiating to neck rated as severe, sharp intermittent at rest, associated with diaphoresis and with sob when occurring. Troponin 54 then trending down, pt was Brilinta loaded and started on Heparin infusion, now s/p cardiac cath demonstrating 3VD. TTE: EF37%, mild MR, no pericardial effusion. CT Surgery consulted for CABG evaluation.    On 10/17/19 s/p CABG x 3 LIMA (LIMA-LAD, SVG-OM, SVG-RCA) EF 35%     Postop Course: Extubated POD # 0.     Pressor support required à weaned off.     Hyperglycemia à Insulin gtt à weaned off     10/18 VVS; Transferred to 2 [facility] à Left pleural CT remains in place à pleural vac à LWS     10/19 Left chest removal, bblocker increased for episode of PAF 2-3 sec    10/20 pacing wires removed. Anticipate discharge tomorrw

## 2019-10-14 NOTE — PROGRESS NOTE ADULT - PROBLEM SELECTOR PLAN 1
Per cardiology, plan for [facility] 10/14  - appreciate cardiology recs  - c/w asa 81mg qd, Brilinta 90mg BID, atorvastatin 80mg qhs  - c/w metoprolol tartrate 12.5mg PO BID, lisinopril 10mg qd  - c/w heparin gtt x 48 hrs  - completed TTE  - pending cards f/u regarding [facility] today

## 2019-10-14 NOTE — PROGRESS NOTE ADULT - ASSESSMENT
69M [place] w/ former smoker (quit 8 years ago; 1/2 PPD for 25 years), impaired glucose tolerance, HTN, HLD presents to [facility] for chest pain

## 2019-10-14 NOTE — DISCHARGE NOTE PROVIDER - NSDCCPTREATMENT_GEN_ALL_CORE_FT
PRINCIPAL PROCEDURE  Procedure: Left heart cardiac catheterization  Findings and Treatment:       SECONDARY PROCEDURE  Procedure: Transthoracic echo  Findings and Treatment: PRINCIPAL PROCEDURE  Procedure: Bypass graft, coronary artery, 3 venous grafts  Findings and Treatment:       SECONDARY PROCEDURE  Procedure: Transthoracic echo  Findings and Treatment:     Procedure: Left heart cardiac catheterization  Findings and Treatment: This test showed 3-vessel disease.

## 2019-10-14 NOTE — DISCHARGE NOTE PROVIDER - NSDCCPCAREPLAN_GEN_ALL_CORE_FT
PRINCIPAL DISCHARGE DIAGNOSIS  Diagnosis: Unstable angina  Assessment and Plan of Treatment: You were found to have unstable angina. *** PRINCIPAL DISCHARGE DIAGNOSIS  Diagnosis: Triple vessel coronary artery disease  Assessment and Plan of Treatment:       SECONDARY DISCHARGE DIAGNOSES  Diagnosis: Unstable angina pectoris due to coronary arteriosclerosis  Assessment and Plan of Treatment: You were having intermittent chest pain and shortness of breath.

## 2019-10-14 NOTE — PROGRESS NOTE ADULT - ASSESSMENT
UA/NSTEMI p/w CP and HORTA  Mod global LVD - global ischemia vs unrelated CM - Voltage speaks against restriction    RBBB/LAHB  LVH - no h/o HTN    ADVISE:  LHC/RHC today  Check pBNP    Jean Sidhu MD, FACC  Office: [phone]  Cell: [phone]

## 2019-10-14 NOTE — PROGRESS NOTE ADULT - SUBJECTIVE AND OBJECTIVE BOX
********Cardiology Progress Note***************    CC: HORTA      INTERVAL HISTORY:  No recurrence of CP, but remains dyspneic w/ minor effort.  No throat, jaw. arm or upper back pain.  No orthopnea, PND.  No edema. No palpitations, dizziness or syncope.           Allergies    No Known Allergies    Intolerances    	    MEDICATIONS:  aspirin enteric coated 81 milliGRAM(s) Oral daily  heparin  Infusion.  Unit(s)/Hr IV Continuous <Continuous>  heparin  Injectable 4700 Unit(s) IV Push every 6 hours PRN  lisinopril 10 milliGRAM(s) Oral daily  metoprolol tartrate 12.5 milliGRAM(s) Oral two times a day  ticagrelor 90 milliGRAM(s) Oral every 12 hours            atorvastatin 80 milliGRAM(s) Oral at bedtime    influenza   Vaccine 0.5 milliLiter(s) IntraMuscular once      PAST MEDICAL & SURGICAL HISTORY:  HTN (hypertension)  HLD (hyperlipidemia)  H/O impaired glucose tolerance  No significant past surgical history      FAMILY HISTORY:  No pertinent family history in first degree relatives      SOCIAL HISTORY:  unchanged    REVIEW OF SYSTEMS:    CONSTITUTIONAL: No fever or chills.  appetite is normal  EYES: no visual disturbances  ENMT:  No epistaxis  RESPIRATORY: No cough, wheezing  or hemoptysis  CARDIOVASCULAR: see HPI  GASTROINTESTINAL: No abdominal pain. No nausea, vomiting, or hematemesis; No diarrhea or constipation. No melena or hematochezia.  GENITOURINARY: No dysuria, frequency, hematuria  NEUROLOGICAL: No headache.  No amaurosis.  No new focal motor or sensory disturbance  SKIN: No pruritis or rash   MUSCULOSKELETAL: No joint pain or swelling; No muscle, back, or extremity pain  PSYCHIATRIC: No depression, anxiety or difficulty sleeping  HEME/LYMPH: No easy bruising, or bleeding gums  	    [ ] All others negative	  [ ] Unable to obtain    PHYSICAL EXAM:  T(C): 36.7 (10-14-19 @ 11:45), Max: 37.4 (10-13-19 @ 16:12)  HR: 56 (10-14-19 @ 11:45) (56 - 70)  BP: 143/85 (10-14-19 @ 11:45) (137/82 - 161/86)  RR: 18 (10-14-19 @ 11:45) (16 - 18)  SpO2: 99% (10-14-19 @ 11:45) (94% - 99%)  Wt(kg): --  I&O's Summary    13 Oct 2019 07:01  -  14 Oct 2019 07:00  --------------------------------------------------------  IN: 1279 mL / OUT: 0 mL / NET: 1279 mL    14 Oct 2019 07:01  -  14 Oct 2019 12:30  --------------------------------------------------------  IN: 500 mL / OUT: 0 mL / NET: 500 mL        Appearance: NAD. No respiratory distress	  HEENT: Normal oral mucosa, PERRL, EOMI	  Lymphatic: No lymphadenopathy  Cardiovascular: No JVD.  Carotids 2+ w/o bruits.  - PMI not palpable. Normal S1 S2.  No murmurs, No edema  Respiratory: Lungs clear to auscultation	  Psychiatry: A & O x 3, Mood & affect appropriate  Gastrointestinal: Soft, Non-tender, + BS	  Skin: No rashes, No ecchymoses, No cyanosis	  Neurologic: Non-focal  Extremities: Normal range of motion, No clubbing, cyanosis or edema  Vascular: Peripheral pulses palpable 2+ bilaterally      LABS:	 	    CBC Full  -  ( 14 Oct 2019 08:48 )  WBC Count : 6.79 K/uL  Hemoglobin : 13.7 g/dL  Hematocrit : 42.2 %  Platelet Count - Automated : 269 K/uL  Mean Cell Volume : 88.3 fl  Mean Cell Hemoglobin : 28.7 pg  Mean Cell Hemoglobin Concentration : 32.5 gm/dL  Auto Neutrophil # : x  Auto Lymphocyte # : x  Auto Monocyte # : x  Auto Eosinophil # : x  Auto Basophil # : x  Auto Neutrophil % : x  Auto Lymphocyte % : x  Auto Monocyte % : x  Auto Eosinophil % : x  Auto Basophil % : x    10-14    142  |  106  |  28<H>  ----------------------------<  102<H>  3.8   |  22  |  0.83  10-13    139  |  107  |  22  ----------------------------<  106<H>  4.2   |  20<L>  |  0.72    Ca    8.9      14 Oct 2019 07:10  Ca    9.2      13 Oct 2019 03:59  Phos  4.2     10-14  Phos  3.6     10-13  Mg     2.2     10-14  Mg     2.2     10-13        proBNP:   Lipid Profile:   HgA1c:   TSH:       CARDIAC MARKERS:    TTE:  < from: Transthoracic Echocardiogram (10.13.19 @ 12:55) >  1. Tethered mitral valve leaflets with normal opening. Mild  mitral regurgitation.  2. Calcified trileaflet aortic valve with normal opening.  3. Severe concentric left ventricular hypertrophy with  enlargement.  4. Moderate global left ventricular systolic dysfunction.  5. The right ventricle is not well visualized.    < end of copied text >          TELEMETRY: 	NSR 60-90s    ECG:  	NSR. VAL. mLAD. RBBB. LVH.  RADIOLOGY:

## 2019-10-14 NOTE — PROGRESS NOTE ADULT - SUBJECTIVE AND OBJECTIVE BOX
CHEVY AMARAL  69y Male    ************************************  Medicine Team 2  Mellissa Jimenez, MS4  Pager [phone]  ************************************    INTERVAL HPI/OVERNIGHT EVENTS: Pt states that he is eating and sleeping well. He reports an episode of shortness of breath and fatigue w/o associated chest pain after brushing his teeth this morning, similar in nature to what he experienced yesterday. He said the episode lasted fewer than five minutes. He continues to endorse a left sided neck/shoulder pain present since Wednesday. However, he states this neck/shoulder pain has decreased in nature since yesterday. He denies chest pain, headaches, fever, n/v, diarrhea, or constipation.      Vital Signs Last 24 Hrs  T(C): 36.8 (14 Oct 2019 09:05), Max: 37.4 (13 Oct 2019 16:12)  T(F): 98.2 (14 Oct 2019 09:05), Max: 99.4 (13 Oct 2019 16:12)  HR: 62 (14 Oct 2019 09:05) (59 - 70)  BP: 161/86 (14 Oct 2019 09:05) (137/82 - 161/86)  BP(mean): --  RR: 16 (14 Oct 2019 09:05) (16 - 18)  SpO2: 94% (14 Oct 2019 09:05) (94% - 98%)      PHYSICAL EXAM:  GENERAL: NAD, well-groomed, well-developed  HEAD:  Atraumatic, Normocephalic  EYES: EOMI, conjunctiva and sclera clear  ENMT: Moist mucous membranes, Missing some teeth  NECK: Supple, No JVD, Normal thyroid  NERVOUS SYSTEM:  Alert & Oriented X3, no focal deficits  CHEST/LUNG: Clear to percussion bilaterally; No rales, rhonchi, wheezing, or rubs  HEART: Regular rate and rhythm; No murmurs, rubs, or gallops  ABDOMEN: Soft, Nontender, Nondistended; Bowel sounds present  EXTREMITIES:  2+ Peripheral Pulses, No clubbing, cyanosis, or edema  SKIN: No rashes or lesions    Consultant(s) Notes Reviewed:  [x ] YES  [ ] NO  Care Discussed with Consultants/Other Providers [ x] YES  [ ] NO        LABS:                        13.7   6.79  )-----------( 269      ( 14 Oct 2019 08:48 )             42.2     10-14    142  |  106  |  28<H>  ----------------------------<  102<H>  3.8   |  22  |  0.83    Ca    8.9      14 Oct 2019 07:10  Phos  4.2     10-14  Mg     2.2     10-14      PTT - ( 13 Oct 2019 17:01 )  PTT:59.4 sec      10/12 11:40  - TSH 3.36  - Cholesterol, serum: 244  - Triglycerides, serum: 99  - HDL Cholesterol, serum: 48  - Direct LDL: 176  - Total cholesterol/HDL ratio measurement: 5.1    10/13 12:55  - HCV S/CO Ratio: 0.21  - HCV Interpretation: nonreactive      RADIOLOGY & ADDITIONAL TESTS:  CXR PA/LAT 10/11 20:05  FINDINGS: No acute osseous antibodies. The trachea is midline. The heart is enlarged. The lungs are clear bilaterally. There is no pleural effusion or pneumothorax.  IMPRESSION: Clear lungs. Cardiomegaly.    12 Lead EKG 10/13 8:22  Ventricular Rate 60 BPM, Atrial Rate 60 BPM, P-R Interval 170 ms, QRS Duration 162 ms, Q-T Interval 470 ms, QTC Calculation(Bezet) 470 ms, P Axis 14 degrees, R Axis -77 degrees, T Axis 108 degrees  Diagnosis Line NORMAL SINUS RHYTHM, RIGHT BUNDLE BRANCH BLOCK, LEFT VENTRICULAR HYPERTROPHY WITH REPOLARIZATION ABNORMALITY    TTE 10/13 12:55  Conclusions:  1. Tethered mitral valve leaflets with normal opening. Mild mitral regurgitation.  2. Calcified trileaflet aortic valve with normal opening.  3. Severe concentric left ventricular hypertrophy with enlargement.  4. Moderate global left ventricular systolic dysfunction.  5. The right ventricle is not well visualized.    Imaging Personally Reviewed:  [X] YES  [ ] NO      MEDICATIONS  (STANDING):  aspirin enteric coated 81 milliGRAM(s) Oral daily  atorvastatin 80 milliGRAM(s) Oral at bedtime  heparin  Infusion.  Unit(s)/Hr (9.5 mL/Hr) IV Continuous <Continuous>  influenza   Vaccine 0.5 milliLiter(s) IntraMuscular once  lisinopril 10 milliGRAM(s) Oral daily  metoprolol tartrate 12.5 milliGRAM(s) Oral two times a day  ticagrelor 90 milliGRAM(s) Oral every 12 hours    MEDICATIONS  (PRN):  heparin  Injectable 4700 Unit(s) IV Push every 6 hours PRN For aPTT less than 40

## 2019-10-15 DIAGNOSIS — I20.0 UNSTABLE ANGINA: ICD-10-CM

## 2019-10-15 DIAGNOSIS — Z71.89 OTHER SPECIFIED COUNSELING: ICD-10-CM

## 2019-10-15 DIAGNOSIS — I25.10 ATHEROSCLEROTIC HEART DISEASE OF NATIVE CORONARY ARTERY WITHOUT ANGINA PECTORIS: ICD-10-CM

## 2019-10-15 LAB
ANION GAP SERPL CALC-SCNC: 14 MMOL/L — SIGNIFICANT CHANGE UP (ref 5–17)
APTT BLD: 27.6 SEC — SIGNIFICANT CHANGE UP (ref 27.5–36.3)
BASOPHILS # BLD AUTO: 0.04 K/UL — SIGNIFICANT CHANGE UP (ref 0–0.2)
BASOPHILS NFR BLD AUTO: 0.5 % — SIGNIFICANT CHANGE UP (ref 0–2)
BUN SERPL-MCNC: 24 MG/DL — HIGH (ref 7–23)
CALCIUM SERPL-MCNC: 9.2 MG/DL — SIGNIFICANT CHANGE UP (ref 8.4–10.5)
CHLORIDE SERPL-SCNC: 105 MMOL/L — SIGNIFICANT CHANGE UP (ref 96–108)
CO2 SERPL-SCNC: 22 MMOL/L — SIGNIFICANT CHANGE UP (ref 22–31)
CREAT SERPL-MCNC: 0.77 MG/DL — SIGNIFICANT CHANGE UP (ref 0.5–1.3)
EOSINOPHIL # BLD AUTO: 0.23 K/UL — SIGNIFICANT CHANGE UP (ref 0–0.5)
EOSINOPHIL NFR BLD AUTO: 3.1 % — SIGNIFICANT CHANGE UP (ref 0–6)
GLUCOSE SERPL-MCNC: 97 MG/DL — SIGNIFICANT CHANGE UP (ref 70–99)
HCT VFR BLD CALC: 42.9 % — SIGNIFICANT CHANGE UP (ref 39–50)
HGB BLD-MCNC: 14.1 G/DL — SIGNIFICANT CHANGE UP (ref 13–17)
IMM GRANULOCYTES NFR BLD AUTO: 0.1 % — SIGNIFICANT CHANGE UP (ref 0–1.5)
INR BLD: 1.08 RATIO — SIGNIFICANT CHANGE UP (ref 0.88–1.16)
LYMPHOCYTES # BLD AUTO: 1.87 K/UL — SIGNIFICANT CHANGE UP (ref 1–3.3)
LYMPHOCYTES # BLD AUTO: 25.2 % — SIGNIFICANT CHANGE UP (ref 13–44)
MAGNESIUM SERPL-MCNC: 2.2 MG/DL — SIGNIFICANT CHANGE UP (ref 1.6–2.6)
MCHC RBC-ENTMCNC: 28.9 PG — SIGNIFICANT CHANGE UP (ref 27–34)
MCHC RBC-ENTMCNC: 32.9 GM/DL — SIGNIFICANT CHANGE UP (ref 32–36)
MCV RBC AUTO: 87.9 FL — SIGNIFICANT CHANGE UP (ref 80–100)
MONOCYTES # BLD AUTO: 0.54 K/UL — SIGNIFICANT CHANGE UP (ref 0–0.9)
MONOCYTES NFR BLD AUTO: 7.3 % — SIGNIFICANT CHANGE UP (ref 2–14)
NEUTROPHILS # BLD AUTO: 4.74 K/UL — SIGNIFICANT CHANGE UP (ref 1.8–7.4)
NEUTROPHILS NFR BLD AUTO: 63.8 % — SIGNIFICANT CHANGE UP (ref 43–77)
NT-PROBNP SERPL-SCNC: 2029 PG/ML — HIGH (ref 0–300)
PHOSPHATE SERPL-MCNC: 4.2 MG/DL — SIGNIFICANT CHANGE UP (ref 2.5–4.5)
PLATELET # BLD AUTO: 264 K/UL — SIGNIFICANT CHANGE UP (ref 150–400)
POTASSIUM SERPL-MCNC: 3.8 MMOL/L — SIGNIFICANT CHANGE UP (ref 3.5–5.3)
POTASSIUM SERPL-SCNC: 3.8 MMOL/L — SIGNIFICANT CHANGE UP (ref 3.5–5.3)
PROTHROM AB SERPL-ACNC: 12.3 SEC — SIGNIFICANT CHANGE UP (ref 10–12.9)
RBC # BLD: 4.88 M/UL — SIGNIFICANT CHANGE UP (ref 4.2–5.8)
RBC # FLD: 13.9 % — SIGNIFICANT CHANGE UP (ref 10.3–14.5)
SODIUM SERPL-SCNC: 141 MMOL/L — SIGNIFICANT CHANGE UP (ref 135–145)
WBC # BLD: 7.43 K/UL — SIGNIFICANT CHANGE UP (ref 3.8–10.5)
WBC # FLD AUTO: 7.43 K/UL — SIGNIFICANT CHANGE UP (ref 3.8–10.5)

## 2019-10-15 PROCEDURE — 99233 SBSQ HOSP IP/OBS HIGH 50: CPT | Mod: GC

## 2019-10-15 PROCEDURE — 99222 1ST HOSP IP/OBS MODERATE 55: CPT

## 2019-10-15 PROCEDURE — 93880 EXTRACRANIAL BILAT STUDY: CPT | Mod: 26

## 2019-10-15 RX ORDER — ENOXAPARIN SODIUM 100 MG/ML
40 INJECTION SUBCUTANEOUS DAILY
Refills: 0 | Status: DISCONTINUED | OUTPATIENT
Start: 2019-10-15 | End: 2019-10-16

## 2019-10-15 RX ORDER — INSULIN LISPRO 100/ML
VIAL (ML) SUBCUTANEOUS AT BEDTIME
Refills: 0 | Status: DISCONTINUED | OUTPATIENT
Start: 2019-10-15 | End: 2019-10-17

## 2019-10-15 RX ORDER — INSULIN LISPRO 100/ML
VIAL (ML) SUBCUTANEOUS
Refills: 0 | Status: DISCONTINUED | OUTPATIENT
Start: 2019-10-15 | End: 2019-10-17

## 2019-10-15 RX ORDER — TICAGRELOR 90 MG/1
90 TABLET ORAL EVERY 12 HOURS
Refills: 0 | Status: DISCONTINUED | OUTPATIENT
Start: 2019-10-15 | End: 2019-10-15

## 2019-10-15 RX ADMIN — ENOXAPARIN SODIUM 40 MILLIGRAM(S): 100 INJECTION SUBCUTANEOUS at 11:47

## 2019-10-15 RX ADMIN — Medication 12.5 MILLIGRAM(S): at 18:10

## 2019-10-15 RX ADMIN — Medication 81 MILLIGRAM(S): at 08:09

## 2019-10-15 RX ADMIN — LISINOPRIL 10 MILLIGRAM(S): 2.5 TABLET ORAL at 05:18

## 2019-10-15 RX ADMIN — Medication 12.5 MILLIGRAM(S): at 08:09

## 2019-10-15 RX ADMIN — ATORVASTATIN CALCIUM 80 MILLIGRAM(S): 80 TABLET, FILM COATED ORAL at 23:03

## 2019-10-15 NOTE — CONSULT NOTE ADULT - SUBJECTIVE AND OBJECTIVE BOX
History of Present Illness:  69M Tajik w/ former smoker (quit 8 year; 10 pack year), impaired glucose tolerance, HTN, HLD presents to [facility] for chest pain. Patient had chest pain begin 3d prior after developing left neck pain radiating down left arm and left chest pain rated as severe, sharp, intermittent at rest, associated with diaphoresis and with sob when occurring. The patient does not take medications and has had poor follow up with clinic with only visit in Feb 2019. There is no history of CAD in first degree relatives and the patient has not taken aspirin in the last 7d. No fever, chills, cough, palpitations, n/v/d, leg swelling, inability to lay flat.    In the ED, VS 98.2, 180/11, 72, 18 94%RA  s/p , Brilinta 180x1, heparin infusion (12 Oct 2019 01:33)       Past Medical History  HTN (hypertension)  HLD (hyperlipidemia)  H/O impaired glucose tolerance  No pertinent past medical history      Past Surgical History  No significant past surgical history      MEDICATIONS  (STANDING):  aspirin enteric coated 81 milliGRAM(s) Oral daily  atorvastatin 80 milliGRAM(s) Oral at bedtime  enoxaparin Injectable 40 milliGRAM(s) SubCutaneous daily  insulin lispro (HumaLOG) corrective regimen sliding scale   SubCutaneous three times a day before meals  insulin lispro (HumaLOG) corrective regimen sliding scale   SubCutaneous at bedtime  metoprolol tartrate 12.5 milliGRAM(s) Oral two times a day      Vital Signs Last 24 Hrs  T(C): 36.7 (10-15-19 @ 11:26), Max: 36.9 (10-14-19 @ 20:15)  T(F): 98 (10-15-19 @ 11:26), Max: 98.5 (10-14-19 @ 20:45)  HR: 64 (10-15-19 @ 11:26) (57 - 77)  BP: 120/66 (10-15-19 @ 11:26) (120/66 - 165/96)  RR: 18 (10-15-19 @ 11:26) (16 - 18)  SpO2: 96% (10-15-19 @ 11:26) (93% - 98%)             Height (cm): 177.8    Weight (kg): 77.7    BMI (kg/m2): 24.6    (11 Oct 2019 20:26)      Allergies: No Known Allergies      SOCIAL HISTORY:  , lives with spouse, 2 sons. 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  Smoker: [X] Yes  [ ] No                 Former smoker 1 PPD X25 years/ pt states he quit 8 years ago  ETOH use: [ ] Yes  [X] No              Pt denies  Ilicit Drug use:  [ ] Yes  [X] No       Pt denies      FAMILY HISTORY:  No pertinent family history in first degree relatives      Review of Systems  GENERAL:  no weakness, fatigue, fevers or chills  NEURO: no dizziness, numbness, tingling or weakness  SKIN: no itching, burning, rashes, or lesions   HEENT: no visual changes;  no headache, no vertigo, no recent colds  RESPIRATORY: no shortness of breath, no cough, sputum, wheezing  CARDIOVASCULAR:  no chest pain,  or palpitations  GI: no abd pain. no N/V/D.  PERIPHERAL VASCULAR: no swelling, no tenderness, no erythema      PHYSICAL EXAM  General: Well nourished, well developed, NAD.                                              Neuro: Normal exam oriented to person/place & time with no focal motor or sensory  deficits.                    Eyes: Normal exam of conjunctiva & lids, pupils equally reactive.   ENT: Normal exam of nasal/oral mucosa with absence of cyanosis.   Neck: Normal exam of jugular veins, trachea & thyroid.   Chest: Normal lung exam with good air movement absence of wheezes, rales, or rhonchi.                                                                         CV:  Auscultation: normal S1S2, RRR   Carotids: No Bruits[X]  Abdominal Aorta: normal [X] nonpalpable[X]                                                                         GI: Normal exam of abdomen with no noted masses or tenderness. +[id number]                                                                                            Extremities: Normal no evidence of cyanosis or deformity, Edema: none  Lower Extremity Pulses: Right[+2DP] Left[+2DP] Varicosities[none]  SKIN : Normal exam to inspection & palpation. Right radial incision w/DSD CDI, no bleeding, no hematoma.                                                             LABS:                        14.1   7.43  )-----------( 264      ( 15 Oct 2019 08:33 )             42.9     141  |  105  |  24<H>  ----------------------------<  97  3.8   |  22  |  0.77        PTT - ( 14 Oct 2019 09:23 )  PTT:47.0 sec      Cardiac Cath: (prelim 10/14):  LAD 99%, CX 95%, ZBD453% stenosis      < from: Transthoracic Echocardiogram (10.13.19 @ 12:55) >  Observations:  Mitral Valve: Tethered mitral valve leaflets with normal  opening. Mild mitral regurgitation.  Aortic Valve/Aorta: Calcified trileaflet aortic valve with  normal opening.  Normal aortic root size. (Ao: 3.6 cm at the sinuses of  Valsalva).  Left Atrium:Normal left atrium.  LA volume index = 31  cc/m2.  Left Ventricle: Moderate global left ventricular systolic  dysfunction. Severe concentric left ventricular hypertrophy  with enlargement. EF 37%  Right Heart: Normal right atrium. The right ventricle is  not well visualized. Normal tricuspid valve. Minimal  tricuspid regurgitation. Normal pulmonic valve. Minimal  pulmonic regurgitation.  Pericardium/Pleura: Normal pericardium with no pericardial  effusion.  Hemodynamic: Estimated right atrial pressure is 8 mm Hg.      < from: Xray Chest 2 Views PA/Lat (10.11.19 @ 20:05) >  FINDINGS:   No acute osseous antibodies. The trachea is midline. The heart is   enlarged. The lungs are clear bilaterally. There is no pleural effusion   or pneumothorax.

## 2019-10-15 NOTE — PROGRESS NOTE ADULT - SUBJECTIVE AND OBJECTIVE BOX
CHEVY AMARAL  69y Male    ************************************  Medicine Team 2  Mellissa Jimenez, MS4  Pager [phone]  ************************************    INTERVAL HPI/OVERNIGHT EVENTS: Pt states that he went for cardiac cath yesterday without any complications. He states that throughout the day yesterday, he had about "4-5" intermittent episodes of shortness of breath and fatigue without associated chest pain lasting "less than 5 minutes." He continues to endorse continuous left neck/shoulder pain described as "sharp." He says that the pain is about a 5/10, decreased from its onset last Wednesday, and no longer worsens in severity with palpation. He reports eating and sleeping well. He denies fevers, chills, headaches, n/v, dysuria, diarrhea, or constipation.       Vital Signs Last 24 Hrs  T(C): 36.5 (15 Oct 2019 07:59), Max: 36.9 (14 Oct 2019 20:15)  T(F): 97.7 (15 Oct 2019 07:59), Max: 98.5 (14 Oct 2019 20:45)  HR: 77 (15 Oct 2019 08:07) (56 - 77)  BP: 145/94 (15 Oct 2019 07:59) (127/79 - 165/96)  BP(mean): --  RR: 18 (15 Oct 2019 07:59) (16 - 18)  SpO2: 93% (15 Oct 2019 07:59) (93% - 99%)      PHYSICAL EXAM:  GENERAL: NAD, well-groomed, well-developed  HEAD:  Atraumatic, Normocephalic  EYES: EOMI, conjunctiva and sclera clear  ENMT: Moist mucous membranes, Missing some teeth  NECK: Supple, No JVD, Normal thyroid  NERVOUS SYSTEM:  Alert & Oriented X3, no focal deficits  CHEST/LUNG: Clear to percussion bilaterally; No rales, rhonchi, wheezing, or rubs  HEART: Regular rate and rhythm; No murmurs, rubs, or gallops  ABDOMEN: Soft, Nontender, Nondistended; Bowel sounds present  EXTREMITIES:  2+ Peripheral Pulses, No clubbing, cyanosis, or edema  SKIN: No rashes or lesions    Consultant(s) Notes Reviewed:  [x ] YES  [ ] NO  Care Discussed with Consultants/Other Providers [ x] YES  [ ] NO        LABS:                        14.1   7.43  )-----------( 264      ( 15 Oct 2019 08:33 )             42.9     10-15    141  |  105  |  24<H>  ----------------------------<  97  3.8   |  22  |  0.77    Ca    9.2      15 Oct 2019 06:40  Phos  4.2     10-15  Mg     2.2     10-15      PTT - ( 14 Oct 2019 09:23 )  PTT:47.0 sec        10/12 11:40  - TSH 3.36  - Cholesterol, serum: 244  - Triglycerides, serum: 99  - HDL Cholesterol, serum: 48  - Direct LDL: 176  - Total cholesterol/HDL ratio measurement: 5.1    10/13 12:55  - HCV S/CO Ratio: 0.21  - HCV Interpretation: nonreactive    10/15 6:40  - Serum pro-BNP: 2029      RADIOLOGY & ADDITIONAL TESTS:  CXR PA/LAT 10/11 20:05  FINDINGS: No acute osseous antibodies. The trachea is midline. The heart is enlarged. The lungs are clear bilaterally. There is no pleural effusion or pneumothorax.  IMPRESSION: Clear lungs. Cardiomegaly.    12 Lead EKG 10/13 8:22  Ventricular Rate 60 BPM, Atrial Rate 60 BPM, P-R Interval 170 ms, QRS Duration 162 ms, Q-T Interval 470 ms, QTC Calculation(Bezet) 470 ms, P Axis 14 degrees, R Axis -77 degrees, T Axis 108 degrees  Diagnosis Line NORMAL SINUS RHYTHM, RIGHT BUNDLE BRANCH BLOCK, LEFT VENTRICULAR HYPERTROPHY WITH REPOLARIZATION ABNORMALITY    TTE 10/13 12:55  Conclusions:  1. Tethered mitral valve leaflets with normal opening. Mild mitral regurgitation.  2. Calcified trileaflet aortic valve with normal opening.  3. Severe concentric left ventricular hypertrophy with enlargement.  4. Moderate global left ventricular systolic dysfunction.  5. The right ventricle is not well visualized.    Cardiac Cath 10/14  - per paper chart, LAD 99% stenosis, LCx 95% stenosis, % stenosis    Imaging Personally Reviewed:  [X] YES  [ ] NO      MEDICATIONS  (STANDING):  aspirin enteric coated 81 milliGRAM(s) Oral daily  atorvastatin 80 milliGRAM(s) Oral at bedtime  influenza   Vaccine 0.5 milliLiter(s) IntraMuscular once  lisinopril 10 milliGRAM(s) Oral daily  metoprolol tartrate 12.5 milliGRAM(s) Oral two times a day CHEVY AMARAL  69y Male    ************************************  Medicine Team 2  Mellissa Jimenez, MS4  Pager [phone]  ************************************    INTERVAL HPI/OVERNIGHT EVENTS: Pt states that he went for cardiac cath yesterday without any complications. He states that throughout the day yesterday, he had about "4-5" intermittent episodes of shortness of breath and fatigue without associated chest pain lasting "less than 5 minutes." He continues to endorse continuous left neck/shoulder pain described as "sharp." He says that the pain is about a 5/10, decreased from its onset last Wednesday, and no longer worsens in severity with palpation. He reports eating and sleeping well. He denies fevers, chills, headaches, n/v, dysuria, diarrhea, or constipation.       Vital Signs Last 24 Hrs  T(C): 36.5 (15 Oct 2019 07:59), Max: 36.9 (14 Oct 2019 20:15)  T(F): 97.7 (15 Oct 2019 07:59), Max: 98.5 (14 Oct 2019 20:45)  HR: 77 (15 Oct 2019 08:07) (56 - 77)  BP: 145/94 (15 Oct 2019 07:59) (127/79 - 165/96)  BP(mean): --  RR: 18 (15 Oct 2019 07:59) (16 - 18)  SpO2: 93% (15 Oct 2019 07:59) (93% - 99%)      PHYSICAL EXAM:  GENERAL: NAD, well-groomed, well-developed  HEAD:  Atraumatic, Normocephalic  EYES: EOMI, conjunctiva and sclera clear  ENMT: Moist mucous membranes, Missing some teeth  NECK: Supple, No JVD, Normal thyroid  NERVOUS SYSTEM:  Alert & Oriented X3, no focal deficits  CHEST/LUNG: Clear to percussion bilaterally; No rales, rhonchi, wheezing, or rubs  HEART: Regular rate and rhythm; No murmurs, rubs, or gallops  ABDOMEN: Soft, Nontender, Nondistended; Bowel sounds present  EXTREMITIES:  2+ Peripheral Pulses, No clubbing, cyanosis, or edema  SKIN: No rashes or lesions    Consultant(s) Notes Reviewed:  [x ] YES  [ ] NO  Care Discussed with Consultants/Other Providers [ x] YES  [ ] NO        LABS:                        14.1   7.43  )-----------( 264      ( 15 Oct 2019 08:33 )             42.9     10-15    141  |  105  |  24<H>  ----------------------------<  97  3.8   |  22  |  0.77    Ca    9.2      15 Oct 2019 06:40  Phos  4.2     10-15  Mg     2.2     10-15      PTT - ( 14 Oct 2019 09:23 )  PTT:47.0 sec        10/12 11:40  - TSH 3.36  - Cholesterol, serum: 244  - Triglycerides, serum: 99  - HDL Cholesterol, serum: 48  - Direct LDL: 176  - Total cholesterol/HDL ratio measurement: 5.1    10/13 12:55  - HCV S/CO Ratio: 0.21  - HCV Interpretation: nonreactive    10/15 6:40  - Serum pro-BNP: 2029      RADIOLOGY & ADDITIONAL TESTS:  CXR PA/LAT 10/11 20:05  FINDINGS: No acute osseous antibodies. The trachea is midline. The heart is enlarged. The lungs are clear bilaterally. There is no pleural effusion or pneumothorax.  IMPRESSION: Clear lungs. Cardiomegaly.    12 Lead EKG 10/13 8:22  Ventricular Rate 60 BPM, Atrial Rate 60 BPM, P-R Interval 170 ms, QRS Duration 162 ms, Q-T Interval 470 ms, QTC Calculation(Bezet) 470 ms, P Axis 14 degrees, R Axis -77 degrees, T Axis 108 degrees  Diagnosis Line NORMAL SINUS RHYTHM, RIGHT BUNDLE BRANCH BLOCK, LEFT VENTRICULAR HYPERTROPHY WITH REPOLARIZATION ABNORMALITY    TTE 10/13 12:55  Conclusions:  1. Tethered mitral valve leaflets with normal opening. Mild mitral regurgitation.  2. Calcified trileaflet aortic valve with normal opening.  3. Severe concentric left ventricular hypertrophy with enlargement.  4. Moderate global left ventricular systolic dysfunction.  5. The right ventricle is not well visualized.    Cardiac Cath 10/14  - per paper chart, LAD 99% stenosis, LCx 95% stenosis, % stenosis    Imaging Personally Reviewed:  [X] YES  [ ] NO      MEDICATIONS  (STANDING):  aspirin enteric coated 81 milliGRAM(s) Oral daily  atorvastatin 80 milliGRAM(s) Oral at bedtime  influenza   Vaccine 0.5 milliLiter(s) IntraMuscular once  lisinopril 10 milliGRAM(s) Oral daily  metoprolol tartrate 12.5 milliGRAM(s) Oral two times a day  lovenox 40mg q day

## 2019-10-15 NOTE — CONSULT NOTE ADULT - PROBLEM SELECTOR RECOMMENDATION 9
Continue pre-op cardiac surgery workup  Check US Carotids/PFTs  Continue asa, beta-blocker and statin therapy  Continue hold Brilinta - check P2Y12 on Wed 10/16 AM  Tentative OR date with Dr. Zavaleta on 10/17 depending on P2y12 results

## 2019-10-15 NOTE — PROGRESS NOTE ADULT - PROBLEM SELECTOR PLAN 1
Cath 10/14 revealed 99% LAD stenosis, 95% LCx stenosis, and 100% RCA stenosis. Serum p-BNP 2029 today.   - scheduled for CABG 10/17  - c/w aspirin, metoprolol tartrate, lisinopril, and atorvastatin Cath 10/14 revealed 99% LAD stenosis, 95% LCx stenosis, and 100% RCA stenosis. Serum p-BNP 2029 today.   - scheduled for CABG 10/17, appreciate CT surgery recs.  - c/w aspirin, metoprolol tartrate, lisinopril, and atorvastatin

## 2019-10-15 NOTE — CONSULT NOTE ADULT - ASSESSMENT
70 y/o male former smoker, with PMHx of HTN, HLD who presents to [facility] with c/o chest pain x3 days radiating to neck rated as severe, sharp, intermittent at rest, associated with diaphoresis and with sob when occurring. Troponin 54 then trending down, pt was Brilinta loaded and started on Heparin infusion, now s/p cardiac cath demonstrating 3VD. TTE: EF37%, mild MR, no pericardial effusion. CT Surgery consulted for CABG evaluation.

## 2019-10-15 NOTE — PROGRESS NOTE ADULT - ASSESSMENT
Please see our previous note for recommendations.  We will sign off at this time. Please contact General Cardiology Consult fellow if any questions arise or guidance needed. The number can be found on [email or web address] under "[email or web address]"     Thank you for allowing us to participate in this patient's care.     Arnaldo Dillard   Cardiology Fellow

## 2019-10-15 NOTE — CHART NOTE - NSCHARTNOTEFT_GEN_A_CORE
Patient received LHC and RHC this afternoon. Per cardiology, heparin does not need to be restarted if patient's troponins are not elevated. Last set of troponins were collected on 10/12/2019: 44; WNL. As such, heparin may be discontinued.

## 2019-10-15 NOTE — PROGRESS NOTE ADULT - PROBLEM SELECTOR PLAN 5
DVT: ambulatory   Diet: Consistent carbohydrate w/ evening snack DVT: lovenox  Diet: Consistent carbohydrate w/ evening snack DVT: lovenox today  Diet: Consistent carbohydrate w/ evening snack  Dispo:  Pending CABG

## 2019-10-15 NOTE — PROGRESS NOTE ADULT - PROBLEM SELECTOR PLAN 3
- HbA1C 6.2  - counseled on lifestyle modifications - HbA1C 6.2  - counseled on lifestyle modifications  - no medications at this time.

## 2019-10-15 NOTE — PROGRESS NOTE ADULT - ASSESSMENT
For all Cardiology service contact information, go to [email or web address] and use "Magic [email or web address]" to login.    SUBJECTIVE:   Denies CP, SOB or Dyspnea.   Telemetry reviewed.   -------------------------------------------------------------------------------------------  ROS:  CV: chest pain (-), palpitation (-) PULM: SOB (-), cough (-), wheezing (-), hemoptysis (-). CONST: fever (-), chills (-) or fatigue (-). HEENT:Visual changes (-); vertigo or throat pain (-);  neck stiffness (-). GI: abdominal pain (-) , nausea/vomiting (-), hematemesis, (-)melena (-), hematochezia (-). : dysuria (-), frequency (-), hematuria (-). NEURO: numbness (-), weakness (-). SKIN: itching (-), rash (-)    All other review of systems is negative unless indicated above.   -------------------------------------------------------------------------------------------  VS:  T(F): 98 (10-15), Max: 98.5 (10-14)  HR: 64 (10-15) (57 - 77)  BP: 120/66 (10-15) (120/66 - 165/96)  RR: 18 (10-15)  SpO2: 96% (10-15)  I&O's Summary    14 Oct 2019 07:01  -  15 Oct 2019 07:00  --------------------------------------------------------  IN: 500 mL / OUT: 0 mL / NET: 500 mL    15 Oct 2019 07:01  -  15 Oct 2019 12:17  --------------------------------------------------------  IN: 320 mL / OUT: 0 mL / NET: 320 mL      -------------------------------------------------------------------------------------------  EXAM:   GEN: No acute distress; well appearing.  CV: (-) JVD, (-) edema, RRR  PULM: CTAB  HEENT: PERRL, EOMI, No scleral icterus. Normal mucosa.  GI: soft, non-tender, non-distended.   MSK: No joint deformity.  NEURO: Non-focal.  LYMPH: No lymphadenopathy.  PSY: Mood & affect appropriate.  SKIN: No rashes, ecchymoses, or cyanosis.  -------------------------------------------------------------------------------------------  LABS:                        14.1   7.43  )-----------( 264      ( 15 Oct 2019 08:33 )             42.9       10-15    141  |  105  |  24<H>  ----------------------------<  97  3.8   |  22  |  0.77    Ca    9.2      15 Oct 2019 06:40  Phos  4.2     10-15  Mg     2.2     10-15       PTT - ( 14 Oct 2019 09:23 )  PTT:47.0 sec       Serum Pro-Brain Natriuretic Peptide: 2029 pg/mL (10-15 @ 06:40)          -------------------------------------------------------------------------------------------  Meds:  aspirin enteric coated 81 milliGRAM(s) Oral daily  atorvastatin 80 milliGRAM(s) Oral at bedtime  enoxaparin Injectable 40 milliGRAM(s) SubCutaneous daily  influenza   Vaccine 0.5 milliLiter(s) IntraMuscular once  lisinopril 10 milliGRAM(s) Oral daily  metoprolol tartrate 12.5 milliGRAM(s) Oral two times a day    -------------------------------------------------------------------------------------------  Telemetry reviewed. New ECG in chart reviewed. New Echocardiogram in chart reviewed.  New Stress Testing in chart reviewed. New Cardiac Catheterization in chart chart reviewed. New imaging reviewed.   < from: Transthoracic Echocardiogram (10.13.19 @ 12:55) >  1. Tethered mitral valve leaflets with normal opening. Mild  mitral regurgitation.  2. Calcified trileaflet aortic valve with normal opening.  3. Severe concentric left ventricular hypertrophy with  enlargement.  4. Moderate global left ventricular systolic dysfunction.  5. The right ventricle is not well visualized.    < end of copied text >    -------------------------------------------------------------------------------------------  ASSESSMENT AND PLAN:     70 y/o M w/ no sig PMH but is a former smoker presented with UA found to have TVD. s/p  LHC 10/14, LAD 99% stenosis, LCx 95% stenosis, % stenosis. Surgery consulted. Echo shows moderate global LV dysfunction.     Recommendations.   - continue ASA 81mg daily, Brilinta 90mg BID, atorvastatin 80mg qhs  - continue metoprolol tartrate 12.5mg PO BID, lisinopril 10mg daily  - euvolemic on exam  - Appreciate CT surgery recommendations.     We will sign off at this time. Please call General Cardiology Consult phone if there are any further questions or guidance needed.     Arnaldo Dillard  Cardiology Fellow   [phone]     For all Cardiology service contact information, go to [email or web address] and use "Magic Leap" to login.

## 2019-10-15 NOTE — PROGRESS NOTE ADULT - ASSESSMENT
69M Burkin[place] former smoker (quit 8 years ago; 1/2 PPD for 25 years) not on any home meds presented to [facility] for chest pain and was found to have 99% LAD stenosis, 95% LCx stenosis, and 100% RCA stenosis scheduled for CABG Thursday 10/17.

## 2019-10-16 ENCOUNTER — TRANSCRIPTION ENCOUNTER (OUTPATIENT)
Age: 69
End: 2019-10-16

## 2019-10-16 LAB
ANION GAP SERPL CALC-SCNC: 10 MMOL/L — SIGNIFICANT CHANGE UP (ref 5–17)
APTT BLD: 30.8 SEC — SIGNIFICANT CHANGE UP (ref 27.5–36.3)
BASOPHILS # BLD AUTO: 0.04 K/UL — SIGNIFICANT CHANGE UP (ref 0–0.2)
BASOPHILS NFR BLD AUTO: 0.6 % — SIGNIFICANT CHANGE UP (ref 0–2)
BLD GP AB SCN SERPL QL: NEGATIVE — SIGNIFICANT CHANGE UP
BUN SERPL-MCNC: 22 MG/DL — SIGNIFICANT CHANGE UP (ref 7–23)
CALCIUM SERPL-MCNC: 9.3 MG/DL — SIGNIFICANT CHANGE UP (ref 8.4–10.5)
CHLORIDE SERPL-SCNC: 105 MMOL/L — SIGNIFICANT CHANGE UP (ref 96–108)
CHOLEST SERPL-MCNC: 191 MG/DL — SIGNIFICANT CHANGE UP (ref 10–199)
CO2 SERPL-SCNC: 23 MMOL/L — SIGNIFICANT CHANGE UP (ref 22–31)
CREAT SERPL-MCNC: 0.74 MG/DL — SIGNIFICANT CHANGE UP (ref 0.5–1.3)
EOSINOPHIL # BLD AUTO: 0.3 K/UL — SIGNIFICANT CHANGE UP (ref 0–0.5)
EOSINOPHIL NFR BLD AUTO: 4.3 % — SIGNIFICANT CHANGE UP (ref 0–6)
FIBRINOGEN PPP-MCNC: 543 MG/DL — HIGH (ref 320–500)
GLUCOSE BLDC GLUCOMTR-MCNC: 93 MG/DL — SIGNIFICANT CHANGE UP (ref 70–99)
GLUCOSE BLDC GLUCOMTR-MCNC: 96 MG/DL — SIGNIFICANT CHANGE UP (ref 70–99)
GLUCOSE SERPL-MCNC: 102 MG/DL — HIGH (ref 70–99)
HCT VFR BLD CALC: 42.5 % — SIGNIFICANT CHANGE UP (ref 39–50)
HDLC SERPL-MCNC: 35 MG/DL — LOW
HGB BLD-MCNC: 14 G/DL — SIGNIFICANT CHANGE UP (ref 13–17)
IMM GRANULOCYTES NFR BLD AUTO: 0.3 % — SIGNIFICANT CHANGE UP (ref 0–1.5)
INR BLD: 1.13 RATIO — SIGNIFICANT CHANGE UP (ref 0.88–1.16)
LIPID PNL WITH DIRECT LDL SERPL: 120 MG/DL — HIGH
LYMPHOCYTES # BLD AUTO: 2.27 K/UL — SIGNIFICANT CHANGE UP (ref 1–3.3)
LYMPHOCYTES # BLD AUTO: 32.2 % — SIGNIFICANT CHANGE UP (ref 13–44)
MAGNESIUM SERPL-MCNC: 2.1 MG/DL — SIGNIFICANT CHANGE UP (ref 1.6–2.6)
MCHC RBC-ENTMCNC: 28.7 PG — SIGNIFICANT CHANGE UP (ref 27–34)
MCHC RBC-ENTMCNC: 32.9 GM/DL — SIGNIFICANT CHANGE UP (ref 32–36)
MCV RBC AUTO: 87.1 FL — SIGNIFICANT CHANGE UP (ref 80–100)
MONOCYTES # BLD AUTO: 0.52 K/UL — SIGNIFICANT CHANGE UP (ref 0–0.9)
MONOCYTES NFR BLD AUTO: 7.4 % — SIGNIFICANT CHANGE UP (ref 2–14)
MRSA PCR RESULT.: SIGNIFICANT CHANGE UP
NEUTROPHILS # BLD AUTO: 3.89 K/UL — SIGNIFICANT CHANGE UP (ref 1.8–7.4)
NEUTROPHILS NFR BLD AUTO: 55.2 % — SIGNIFICANT CHANGE UP (ref 43–77)
PA ADP PRP-ACNC: 177 PRU — LOW (ref 194–417)
PHOSPHATE SERPL-MCNC: 3.5 MG/DL — SIGNIFICANT CHANGE UP (ref 2.5–4.5)
PLATELET # BLD AUTO: 251 K/UL — SIGNIFICANT CHANGE UP (ref 150–400)
POTASSIUM SERPL-MCNC: 4.2 MMOL/L — SIGNIFICANT CHANGE UP (ref 3.5–5.3)
POTASSIUM SERPL-SCNC: 4.2 MMOL/L — SIGNIFICANT CHANGE UP (ref 3.5–5.3)
PROTHROM AB SERPL-ACNC: 12.8 SEC — SIGNIFICANT CHANGE UP (ref 10–13.1)
RBC # BLD: 4.88 M/UL — SIGNIFICANT CHANGE UP (ref 4.2–5.8)
RBC # FLD: 13.7 % — SIGNIFICANT CHANGE UP (ref 10.3–14.5)
RH IG SCN BLD-IMP: POSITIVE — SIGNIFICANT CHANGE UP
S AUREUS DNA NOSE QL NAA+PROBE: SIGNIFICANT CHANGE UP
SODIUM SERPL-SCNC: 138 MMOL/L — SIGNIFICANT CHANGE UP (ref 135–145)
TOTAL CHOLESTEROL/HDL RATIO MEASUREMENT: 5.5 RATIO — SIGNIFICANT CHANGE UP (ref 3.4–9.6)
TRIGL SERPL-MCNC: 182 MG/DL — HIGH (ref 10–149)
WBC # BLD: 7.04 K/UL — SIGNIFICANT CHANGE UP (ref 3.8–10.5)
WBC # FLD AUTO: 7.04 K/UL — SIGNIFICANT CHANGE UP (ref 3.8–10.5)

## 2019-10-16 PROCEDURE — 99233 SBSQ HOSP IP/OBS HIGH 50: CPT | Mod: GC

## 2019-10-16 PROCEDURE — 94010 BREATHING CAPACITY TEST: CPT | Mod: 26

## 2019-10-16 RX ORDER — MUPIROCIN 20 MG/G
1 OINTMENT TOPICAL
Refills: 0 | Status: DISCONTINUED | OUTPATIENT
Start: 2019-10-16 | End: 2019-10-17

## 2019-10-16 RX ORDER — CEFUROXIME AXETIL 250 MG
1500 TABLET ORAL ONCE
Refills: 0 | Status: DISCONTINUED | OUTPATIENT
Start: 2019-10-16 | End: 2019-10-17

## 2019-10-16 RX ORDER — CHLORHEXIDINE GLUCONATE 213 G/1000ML
15 SOLUTION TOPICAL
Refills: 0 | Status: COMPLETED | OUTPATIENT
Start: 2019-10-16 | End: 2019-10-17

## 2019-10-16 RX ADMIN — CHLORHEXIDINE GLUCONATE 15 MILLILITER(S): 213 SOLUTION TOPICAL at 22:33

## 2019-10-16 RX ADMIN — Medication 81 MILLIGRAM(S): at 10:10

## 2019-10-16 RX ADMIN — ENOXAPARIN SODIUM 40 MILLIGRAM(S): 100 INJECTION SUBCUTANEOUS at 10:10

## 2019-10-16 RX ADMIN — MUPIROCIN 1 APPLICATION(S): 20 OINTMENT TOPICAL at 17:15

## 2019-10-16 RX ADMIN — ATORVASTATIN CALCIUM 80 MILLIGRAM(S): 80 TABLET, FILM COATED ORAL at 22:33

## 2019-10-16 RX ADMIN — Medication 12.5 MILLIGRAM(S): at 05:17

## 2019-10-16 RX ADMIN — Medication 12.5 MILLIGRAM(S): at 17:15

## 2019-10-16 NOTE — PROGRESS NOTE ADULT - PROBLEM SELECTOR PROBLEM 3
H/O impaired glucose tolerance

## 2019-10-16 NOTE — PROGRESS NOTE ADULT - ASSESSMENT
69M Georgian former smoker (quit 8 years ago; 1/2 PPD for 25 years) not on any home meds presented to [facility] for chest pain and was found to have 99% LAD stenosis, 95% LCx stenosis, and 100% RCA stenosis scheduled for CABG Thursday 10/17.

## 2019-10-16 NOTE — PROGRESS NOTE ADULT - SUBJECTIVE AND OBJECTIVE BOX
CHEVY AMARAL  69y Male    ************************************  Medicine Team 2  Mellissa Jimenez, MS4  Pager [phone]  ************************************    INTERVAL HPI/OVERNIGHT EVENTS: Pt endorses about 2-3 intermittent episodes of shortness of breath and fatigue without associated chest pain lasting less than 5 minutes yesterday. He continues to endorse continuous left neck/shoulder pain that has decreased in intensity from its onset on Wednesday. He reports eating and sleeping well. He denies fevers, chills, headaches, n/v, dysuria, diarrhea, or constipation.     Vital Signs Last 24 Hrs  T(C): 37 (16 Oct 2019 08:00), Max: 37.2 (15 Oct 2019 19:52)  T(F): 98.6 (16 Oct 2019 08:00), Max: 99 (15 Oct 2019 19:52)  HR: 69 (16 Oct 2019 08:00) (61 - 69)  BP: 119/73 (16 Oct 2019 08:00) (119/73 - 164/87)  BP(mean): --  RR: 18 (16 Oct 2019 08:00) (16 - 18)  SpO2: 98% (16 Oct 2019 08:00) (95% - 100%)      PHYSICAL EXAM:  GENERAL: NAD, well-groomed, well-developed  HEAD:  Atraumatic, Normocephalic  EYES: EOMI, conjunctiva and sclera clear  ENMT: Moist mucous membranes, Missing some teeth  NECK: Supple, No JVD, Normal thyroid  NERVOUS SYSTEM:  Alert & Oriented X3, no focal deficits  CHEST/LUNG: Clear to percussion bilaterally; No rales, rhonchi, wheezing, or rubs  HEART: Regular rate and rhythm; No murmurs, rubs, or gallops  ABDOMEN: Soft, Nontender, Nondistended; Bowel sounds present  EXTREMITIES:  2+ Peripheral Pulses, No clubbing, cyanosis, or edema  SKIN: No rashes or lesions    Consultant(s) Notes Reviewed:  [x ] YES  [ ] NO  Care Discussed with Consultants/Other Providers [ x] YES  [ ] NO      LABS:                        14.0   7.04  )-----------( 251      ( 16 Oct 2019 06:36 )             42.5     10-16    138  |  105  |  22  ----------------------------<  102<H>  4.2   |  23  |  0.74    Ca    9.3      16 Oct 2019 05:13  Phos  3.5     10-16  Mg     2.1     10-16    PT/INR - ( 16 Oct 2019 06:11 )   PT: 12.8 sec;   INR: 1.13 ratio       PTT - ( 16 Oct 2019 06:11 )  PTT:30.8 sec      10/12 11:40  - TSH 3.36  - Cholesterol, serum: 244  - Triglycerides, serum: 99  - HDL Cholesterol, serum: 48  - Direct LDL: 176  - Total cholesterol/HDL ratio measurement: 5.1    10/13 12:55  - HCV S/CO Ratio: 0.21  - HCV Interpretation: nonreactive    10/15 6:40  - Serum pro-BNP: 2029    10/16 5:13  - P2Y12 Plt Reactivity: 177    10/16 6:11  - Fibrinogen assay 543    10/16 9:48  - cholesterol, serum: 191  - triglycerides, serum: 182  - HDL cholesterol, serum: 35  - direct LDL: 120  - total cholesterol/HDL ratio measurement: 5.5    RADIOLOGY & ADDITIONAL TESTS:  CXR PA/LAT 10/11 20:05  FINDINGS: No acute osseous antibodies. The trachea is midline. The heart is enlarged. The lungs are clear bilaterally. There is no pleural effusion or pneumothorax.  IMPRESSION: Clear lungs. Cardiomegaly.    12 Lead EKG 10/13 8:22  Ventricular Rate 60 BPM, Atrial Rate 60 BPM, P-R Interval 170 ms, QRS Duration 162 ms, Q-T Interval 470 ms, QTC Calculation(Bezet) 470 ms, P Axis 14 degrees, R Axis -77 degrees, T Axis 108 degrees  Diagnosis Line NORMAL SINUS RHYTHM, RIGHT BUNDLE BRANCH BLOCK, LEFT VENTRICULAR HYPERTROPHY WITH REPOLARIZATION ABNORMALITY    TTE 10/13 12:55  Conclusions:  1. Tethered mitral valve leaflets with normal opening. Mild mitral regurgitation.  2. Calcified trileaflet aortic valve with normal opening.  3. Severe concentric left ventricular hypertrophy with enlargement.  4. Moderate global left ventricular systolic dysfunction.  5. The right ventricle is not well visualized.    Cardiac Cath 10/14  - per paper chart, LAD 99% stenosis, LCx 95% stenosis, % stenosis    Imaging Personally Reviewed:  [X] YES  [ ] NO      MEDICATIONS  (STANDING):  aspirin enteric coated 81 milliGRAM(s) Oral daily  atorvastatin 80 milliGRAM(s) Oral at bedtime  enoxaparin Injectable 40 milliGRAM(s) SubCutaneous daily  insulin lispro (HumaLOG) corrective regimen sliding scale   SubCutaneous three times a day before meals  insulin lispro (HumaLOG) corrective regimen sliding scale   SubCutaneous at bedtime  metoprolol tartrate 12.5 milliGRAM(s) Oral two times a day CHEVY AMARAL  69y Male    ************************************  Medicine Team 2  Mellissa Jimenez, MS4  Pager [phone]  ************************************    INTERVAL HPI/OVERNIGHT EVENTS: Pt endorses about 2-3 intermittent episodes of shortness of breath and fatigue without associated chest pain lasting less than 5 minutes yesterday. He continues to endorse continuous left neck/shoulder pain that has decreased in intensity from its onset on Wednesday. He reports eating and sleeping well. He denies fevers, chills, headaches, n/v, dysuria, diarrhea, or constipation.     Vital Signs Last 24 Hrs  T(C): 37 (16 Oct 2019 08:00), Max: 37.2 (15 Oct 2019 19:52)  T(F): 98.6 (16 Oct 2019 08:00), Max: 99 (15 Oct 2019 19:52)  HR: 69 (16 Oct 2019 08:00) (61 - 69)  BP: 119/73 (16 Oct 2019 08:00) (119/73 - 164/87)  BP(mean): --  RR: 18 (16 Oct 2019 08:00) (16 - 18)  SpO2: 98% (16 Oct 2019 08:00) (95% - 100%)      PHYSICAL EXAM:  GENERAL: NAD, well-groomed, well-developed  HEAD:  Atraumatic, Normocephalic  EYES: EOMI, conjunctiva and sclera clear  ENMT: Moist mucous membranes, Missing some teeth  NECK: Supple, No JVD, Normal thyroid  NERVOUS SYSTEM:  Alert & Oriented X3, no focal deficits  CHEST/LUNG: Clear to percussion bilaterally; No rales, rhonchi, wheezing, or rubs  HEART: Regular rate and rhythm; No murmurs, rubs, or gallops  ABDOMEN: Soft, Nontender, Nondistended; Bowel sounds present  EXTREMITIES:  2+ Peripheral Pulses, No clubbing, cyanosis, or edema  SKIN: No rashes or lesions    Consultant(s) Notes Reviewed:  [x ] YES  [ ] NO  Care Discussed with Consultants/Other Providers [ x] YES  [ ] NO      LABS:                        14.0   7.04  )-----------( 251      ( 16 Oct 2019 06:36 )             42.5     10-16    138  |  105  |  22  ----------------------------<  102<H>  4.2   |  23  |  0.74    Ca    9.3      16 Oct 2019 05:13  Phos  3.5     10-16  Mg     2.1     10-16    PT/INR - ( 16 Oct 2019 06:11 )   PT: 12.8 sec;   INR: 1.13 ratio       PTT - ( 16 Oct 2019 06:11 )  PTT:30.8 sec      10/12 11:40  - TSH 3.36  - Cholesterol, serum: 244  - Triglycerides, serum: 99  - HDL Cholesterol, serum: 48  - Direct LDL: 176  - Total cholesterol/HDL ratio measurement: 5.1    10/13 12:55  - HCV S/CO Ratio: 0.21  - HCV Interpretation: nonreactive    10/15 6:40  - Serum pro-BNP: 2029    10/15 19:26  - MRSA PCR Result: indeterminate  - Staph Aureus PCR Result: indeterminate    10/16 5:13  - P2Y12 Plt Reactivity: 177    10/16 6:11  - Fibrinogen assay 543    10/16 9:48  - cholesterol, serum: 191  - triglycerides, serum: 182  - HDL cholesterol, serum: 35  - direct LDL: 120  - total cholesterol/HDL ratio measurement: 5.5    RADIOLOGY & ADDITIONAL TESTS:  CXR PA/LAT 10/11 20:05  FINDINGS: No acute osseous antibodies. The trachea is midline. The heart is enlarged. The lungs are clear bilaterally. There is no pleural effusion or pneumothorax.  IMPRESSION: Clear lungs. Cardiomegaly.    12 Lead EKG 10/13 8:22  Ventricular Rate 60 BPM, Atrial Rate 60 BPM, P-R Interval 170 ms, QRS Duration 162 ms, Q-T Interval 470 ms, QTC Calculation(Bezet) 470 ms, P Axis 14 degrees, R Axis -77 degrees, T Axis 108 degrees  Diagnosis Line NORMAL SINUS RHYTHM, RIGHT BUNDLE BRANCH BLOCK, LEFT VENTRICULAR HYPERTROPHY WITH REPOLARIZATION ABNORMALITY    TTE 10/13 12:55  Conclusions:  1. Tethered mitral valve leaflets with normal opening. Mild mitral regurgitation.  2. Calcified trileaflet aortic valve with normal opening.  3. Severe concentric left ventricular hypertrophy with enlargement.  4. Moderate global left ventricular systolic dysfunction.  5. The right ventricle is not well visualized.    Cardiac Cath 10/14  - per paper chart, LAD 99% stenosis, LCx 95% stenosis, % stenosis    Imaging Personally Reviewed:  [X] YES  [ ] NO      MEDICATIONS  (STANDING):  aspirin enteric coated 81 milliGRAM(s) Oral daily  atorvastatin 80 milliGRAM(s) Oral at bedtime  enoxaparin Injectable 40 milliGRAM(s) SubCutaneous daily  insulin lispro (HumaLOG) corrective regimen sliding scale   SubCutaneous three times a day before meals  insulin lispro (HumaLOG) corrective regimen sliding scale   SubCutaneous at bedtime  metoprolol tartrate 12.5 milliGRAM(s) Oral two times a day

## 2019-10-16 NOTE — PROGRESS NOTE ADULT - PROBLEM SELECTOR PLAN 4
- lipid panel repeated 10/16 from 10/12; total cholesterol, HDL, and direct LDL all decreased from previous test while triglycerides increased   - c/w atorvastatin 80mg qhs

## 2019-10-16 NOTE — PROGRESS NOTE ADULT - SUBJECTIVE AND OBJECTIVE BOX
Cardiac Surgery Pre-op Note:    CC: Patient is a 69y old  Male who presents with a chief complaint of chest pain (16 Oct 2019 11:19)                                                                                                             Surgeon: Meghann    Procedure: CABG    Allergies    No Known Allergies    Intolerances        HPI:  69M Montenegrin w/ former smoker (quit 8 year; 10 pack year), impaired glucose tolerance, HTN, HLD presents to [facility] for chest pain. Patient had chest pain begin 3d prior after developing left neck pain radiating down left arm and left chest pain rated as severe, sharp, intermittent at rest, associated with diaphoresis and with sob when occurring. The patient does not take medications and has had poor follow up with clinic with only visit in Feb 2019. There is no history of CAD in first degree relatives and the patient has not taken aspirin in the last 7d. No fever, chills, cough, palpitations, n/v/d, leg swelling, inability to lay flat.    In the ED, VS 98.2, 180/11, 72, 18 94%RA  s/p , Brilinta 180x1, heparin infusion (12 Oct 2019 01:33)      PAST MEDICAL & SURGICAL HISTORY:  HTN (hypertension)  HLD (hyperlipidemia)  H/O impaired glucose tolerance  No significant past surgical history      MEDICATIONS  (STANDING):  aspirin enteric coated 81 milliGRAM(s) Oral daily  atorvastatin 80 milliGRAM(s) Oral at bedtime  cefuroxime  IVPB 1500 milliGRAM(s) IV Intermittent once  chlorhexidine 0.12% Liquid 15 milliLiter(s) Swish and Spit two times a day  insulin lispro (HumaLOG) corrective regimen sliding scale   SubCutaneous three times a day before meals  insulin lispro (HumaLOG) corrective regimen sliding scale   SubCutaneous at bedtime  metoprolol tartrate 12.5 milliGRAM(s) Oral two times a day  mupirocin 2% Ointment 1 Application(s) Topical two times a day      Labs:                        14.0   7.04  )-----------( 251      ( 16 Oct 2019 06:36 )             42.5     10-16    138  |  105  |  22  ----------------------------<  102<H>  4.2   |  23  |  0.74    Ca    9.3      16 Oct 2019 05:13  Phos  3.5     10-16  Mg     2.1     10-16      PT/INR - ( 16 Oct 2019 06:11 )   PT: 12.8 sec;   INR: 1.13 ratio         PTT - ( 16 Oct 2019 06:11 )  PTT:30.8 sec    Blood Type: ABO Interpretation: B (10-16 @ 05:17)    HGB A1C: Hemoglobin A1C, Whole Blood: 6.2 % (10-12 @ 10:58)    Prealbumin:   Pro-BNP: Serum Pro-Brain Natriuretic Peptide: 2029 pg/mL (10-15 @ 06:40)    Thyroid Panel: 10-12 @ 11:40/3.36  --/--/--    MRSA: MRSA PCR Result.: Indeterminate MRSA/MSSA PCR assay is a qualitative in vitro diagnostic test for the  direct detection and differentiation of methicillin-resistant  Staphylococcus aureus (MRSA) from Staphylococcus aureus (SA). The assay  detects DNA from nasal swabs in patients at risk for nasal colonization.  It is not intended to diagnose MRSA or SA infections nor guide or monitor  treatment for MRSA/SA infections. A negative result does not preclude  nasal colonization. The assay is FDA-approved and its performance has  been established by [employer], [employer] and the [place], [place]. (10-15 @ 19:26)   / MSSA:     P2Y12: P2Y12 Plt Response Test . (10.16.19 @ 05:13)    P2Y12 Plt Reactivity: 177: Assay performance may be affected by Hematocrit values <33% or >53% or  Platelet count <119 or >502 K/ul. PRU      CXR: < from: Xray Chest 2 Views PA/Lat (10.11.19 @ 20:05) >  No acute osseous antibodies. The trachea is midline. The heart is   enlarged. The lungs are clear bilaterally. There is no pleural effusion   or pneumothorax.      < end of copied text >      EKG: < from: 12 Lead ECG (10.13.19 @ 08:22) >  NORMAL SINUS RHYTHM  RIGHT BUNDLE BRANCH BLOCK  LEFT VENTRICULAR HYPERTROPHY WITH REPOLARIZATION ABNORMALITY      < end of copied text >      < from: VA Duplex Carotid, Bilat (10.15.19 @ 23:17) >    No hemodynamically significant stenosis.    < end of copied text >  Carotid Duplex:      PFT's:    Echocardiogram:  < from: Transthoracic Echocardiogram (10.13.19 @ 12:55) >  1. Tethered mitral valve leaflets with normal opening. Mild  mitral regurgitation.  2. Calcified trileaflet aortic valve with normal opening.  3. Severe concentric left ventricular hypertrophy with  enlargement.  4. Moderate global left ventricular systolic dysfunction.  5. The right ventricle is not well visualized.    < end of copied text >    Cardiac catheterization:    3  VCAD    Vein Mapping:    Gen: WN/WD NAD  Neuro: AAOx3, nonfocal  Pulm: CTA B/L  CV: RRR, S1S2  Abd: Soft, NT, ND +BS  Ext: No edema, + peripheral pulses      Pt has AICD/PPM [ ] Yes  [x ] No             Brand Name:  Pre-op Beta Blocker ordered within 24 hrs of surgery?  [x ] Yes  [ ] No  If not, Why?  Type & Cross  [x ] Yes  [ ] No  NPO after Midnight [ x] Yes  [ ] No  Pre-op ABX ordered, to be taped on chart:  [x ] Yes  [ ] No     Hibiclens/Peridex ordered [ ] Yes  [ ] No  Intraop on Hold JOHANNE [ x]   Consent obtained  [ ] Yes  [ ] No

## 2019-10-16 NOTE — PROGRESS NOTE ADULT - PROBLEM SELECTOR PLAN 2
Newly diagnosed hypertension, likely long standing, however patient is not on medications.   - c/w beta blocker started on admission

## 2019-10-16 NOTE — PROGRESS NOTE ADULT - ATTENDING COMMENTS
Patient interviewed and examined. I was physically present for the essential portions of the E/M service provided.  Chart reviewed and note edited where appropriate.  Case discussed with fellow.  Agree w/ Assessment and Plan as outlined.    Jean Sidhu MD [facility]  Spectra:  [phone]  Office: [phone]
69M pw chest pain admitted for unstable angina. plan for [facility] this afternoon.     Estela Flynn MD  Division of Hospital Medicine  Pager: [phone]  Office: [phone]
s/p PCI with 3v CAD, pending CABG tmrw    Estela Flynn MD  Division of Hospital Medicine  Pager: [phone]  Office: [phone]
s/p LHC with 3 vessel CAD (LAD 99% stenosis, LCx 95% stenosis, % stenosis)  CTS consulted for CABG    Estela Flynn MD  Division of Hospital Medicine  Pager: [phone]  Office: [phone]
Hospitalist- Ap Casarez MD  Pager: [phone]  If no response or off-hours, page [phone]  -------------------------------------  I personally performed the E/M service provided and supervised key portions of the service furnished by the resident/fellow. I agree with the history, physical and assessment/plan as stated above, with the following additional remarks:    UA- continue DAPT, heparin gtt, plan for likely cath monday with cards  Neck pain- could be referred pain from ACS, however the chest pain has subsided and neck pain persists. in setting of his intermittent HTN, would consider vertebral artery dissection if pain persists or patient develops new HA or neuro findings. Will defer workup for now as suspicion is low, and monitor. HTN control.

## 2019-10-16 NOTE — PROGRESS NOTE ADULT - PROBLEM SELECTOR PLAN 1
Cath 10/14 revealed 99% LAD stenosis, 95% LCx stenosis, and 100% RCA stenosis.   - scheduled for CABG 10/17, appreciate CT surgery recs.  - pre-op testing US Carotids/PFTs performed  - P2Y12 Plt Reactivity 177 this AM  - c/w aspirin, metoprolol tartrate, and atorvastatin

## 2019-10-17 ENCOUNTER — APPOINTMENT (OUTPATIENT)
Dept: CARDIOTHORACIC SURGERY | Facility: HOSPITAL | Age: 69
End: 2019-10-17

## 2019-10-17 LAB
ALBUMIN SERPL ELPH-MCNC: 2.9 G/DL — LOW (ref 3.3–5)
ALP SERPL-CCNC: 41 U/L — SIGNIFICANT CHANGE UP (ref 40–120)
ALT FLD-CCNC: 17 U/L — SIGNIFICANT CHANGE UP (ref 10–45)
ANION GAP SERPL CALC-SCNC: 14 MMOL/L — SIGNIFICANT CHANGE UP (ref 5–17)
APTT BLD: 29.9 SEC — SIGNIFICANT CHANGE UP (ref 27.5–36.3)
AST SERPL-CCNC: 34 U/L — SIGNIFICANT CHANGE UP (ref 10–40)
BASE EXCESS BLDV CALC-SCNC: -2.9 MMOL/L — LOW (ref -2–2)
BASE EXCESS BLDV CALC-SCNC: 0.8 MMOL/L — SIGNIFICANT CHANGE UP (ref -2–2)
BASE EXCESS BLDV CALC-SCNC: 1.6 MMOL/L — SIGNIFICANT CHANGE UP (ref -2–2)
BASOPHILS # BLD AUTO: 0.01 K/UL — SIGNIFICANT CHANGE UP (ref 0–0.2)
BASOPHILS NFR BLD AUTO: 0.1 % — SIGNIFICANT CHANGE UP (ref 0–2)
BILIRUB SERPL-MCNC: 0.6 MG/DL — SIGNIFICANT CHANGE UP (ref 0.2–1.2)
BLOOD GAS VENOUS - CREATININE: SIGNIFICANT CHANGE UP MG/DL (ref 0.5–1.3)
BUN SERPL-MCNC: 18 MG/DL — SIGNIFICANT CHANGE UP (ref 7–23)
CA-I SERPL-SCNC: 1.02 MMOL/L — LOW (ref 1.12–1.3)
CA-I SERPL-SCNC: 1.03 MMOL/L — LOW (ref 1.12–1.3)
CA-I SERPL-SCNC: 1.04 MMOL/L — LOW (ref 1.12–1.3)
CALCIUM SERPL-MCNC: 8 MG/DL — LOW (ref 8.4–10.5)
CHLORIDE BLDV-SCNC: SIGNIFICANT CHANGE UP MMOL/L (ref 96–108)
CHLORIDE SERPL-SCNC: 105 MMOL/L — SIGNIFICANT CHANGE UP (ref 96–108)
CK MB BLD-MCNC: 16.1 % — HIGH (ref 0–3.5)
CK MB CFR SERPL CALC: 54.9 NG/ML — HIGH (ref 0–6.7)
CK SERPL-CCNC: 341 U/L — HIGH (ref 30–200)
CO2 SERPL-SCNC: 23 MMOL/L — SIGNIFICANT CHANGE UP (ref 22–31)
CREAT SERPL-MCNC: 0.58 MG/DL — SIGNIFICANT CHANGE UP (ref 0.5–1.3)
EOSINOPHIL # BLD AUTO: 0.06 K/UL — SIGNIFICANT CHANGE UP (ref 0–0.5)
EOSINOPHIL NFR BLD AUTO: 0.5 % — SIGNIFICANT CHANGE UP (ref 0–6)
FIBRINOGEN PPP-MCNC: 331 MG/DL — LOW (ref 350–510)
GAS PNL BLDA: SIGNIFICANT CHANGE UP
GAS PNL BLDV: 137 MMOL/L — SIGNIFICANT CHANGE UP (ref 135–145)
GAS PNL BLDV: 138 MMOL/L — SIGNIFICANT CHANGE UP (ref 135–145)
GAS PNL BLDV: 139 MMOL/L — SIGNIFICANT CHANGE UP (ref 135–145)
GAS PNL BLDV: SIGNIFICANT CHANGE UP
GLUCOSE BLDC GLUCOMTR-MCNC: 101 MG/DL — HIGH (ref 70–99)
GLUCOSE BLDC GLUCOMTR-MCNC: 113 MG/DL — HIGH (ref 70–99)
GLUCOSE BLDC GLUCOMTR-MCNC: 129 MG/DL — HIGH (ref 70–99)
GLUCOSE BLDC GLUCOMTR-MCNC: 135 MG/DL — HIGH (ref 70–99)
GLUCOSE BLDC GLUCOMTR-MCNC: 137 MG/DL — HIGH (ref 70–99)
GLUCOSE BLDC GLUCOMTR-MCNC: 143 MG/DL — HIGH (ref 70–99)
GLUCOSE BLDC GLUCOMTR-MCNC: 96 MG/DL — SIGNIFICANT CHANGE UP (ref 70–99)
GLUCOSE BLDC GLUCOMTR-MCNC: 96 MG/DL — SIGNIFICANT CHANGE UP (ref 70–99)
GLUCOSE BLDV-MCNC: 142 MG/DL — HIGH (ref 70–99)
GLUCOSE BLDV-MCNC: 155 MG/DL — HIGH (ref 70–99)
GLUCOSE BLDV-MCNC: 160 MG/DL — HIGH (ref 70–99)
GLUCOSE SERPL-MCNC: 152 MG/DL — HIGH (ref 70–99)
HCO3 BLDV-SCNC: 23 MMOL/L — SIGNIFICANT CHANGE UP (ref 21–29)
HCO3 BLDV-SCNC: 26 MMOL/L — SIGNIFICANT CHANGE UP (ref 21–29)
HCO3 BLDV-SCNC: 27 MMOL/L — SIGNIFICANT CHANGE UP (ref 21–29)
HCT VFR BLD CALC: 33.6 % — LOW (ref 39–50)
HCT VFR BLDA CALC: 28 % — LOW (ref 39–50)
HCT VFR BLDA CALC: 28 % — LOW (ref 39–50)
HCT VFR BLDA CALC: 29 % — LOW (ref 39–50)
HGB BLD CALC-MCNC: 9 G/DL — LOW (ref 13–17)
HGB BLD CALC-MCNC: 9 G/DL — LOW (ref 13–17)
HGB BLD CALC-MCNC: 9.3 G/DL — LOW (ref 13–17)
HGB BLD-MCNC: 11.5 G/DL — LOW (ref 13–17)
HOROWITZ INDEX BLDV+IHG-RTO: 0 — SIGNIFICANT CHANGE UP
IMM GRANULOCYTES NFR BLD AUTO: 0.5 % — SIGNIFICANT CHANGE UP (ref 0–1.5)
INR BLD: 1.38 RATIO — HIGH (ref 0.88–1.16)
LACTATE BLDV-MCNC: 0.8 MMOL/L — SIGNIFICANT CHANGE UP (ref 0.7–2)
LACTATE BLDV-MCNC: 0.8 MMOL/L — SIGNIFICANT CHANGE UP (ref 0.7–2)
LACTATE BLDV-MCNC: 1 MMOL/L — SIGNIFICANT CHANGE UP (ref 0.7–2)
LYMPHOCYTES # BLD AUTO: 1.21 K/UL — SIGNIFICANT CHANGE UP (ref 1–3.3)
LYMPHOCYTES # BLD AUTO: 10.8 % — LOW (ref 13–44)
MAGNESIUM SERPL-MCNC: 2.1 MG/DL — SIGNIFICANT CHANGE UP (ref 1.6–2.6)
MCHC RBC-ENTMCNC: 29.4 PG — SIGNIFICANT CHANGE UP (ref 27–34)
MCHC RBC-ENTMCNC: 34.2 GM/DL — SIGNIFICANT CHANGE UP (ref 32–36)
MCV RBC AUTO: 85.9 FL — SIGNIFICANT CHANGE UP (ref 80–100)
MONOCYTES # BLD AUTO: 0.43 K/UL — SIGNIFICANT CHANGE UP (ref 0–0.9)
MONOCYTES NFR BLD AUTO: 3.8 % — SIGNIFICANT CHANGE UP (ref 2–14)
NEUTROPHILS # BLD AUTO: 9.4 K/UL — HIGH (ref 1.8–7.4)
NEUTROPHILS NFR BLD AUTO: 84.3 % — HIGH (ref 43–77)
NRBC # BLD: 0 /100 WBCS — SIGNIFICANT CHANGE UP (ref 0–0)
PCO2 BLDV: 48 MMHG — SIGNIFICANT CHANGE UP (ref 35–50)
PCO2 BLDV: 48 MMHG — SIGNIFICANT CHANGE UP (ref 35–50)
PCO2 BLDV: 50 MMHG — SIGNIFICANT CHANGE UP (ref 35–50)
PH BLDV: 7.3 — LOW (ref 7.35–7.45)
PH BLDV: 7.35 — SIGNIFICANT CHANGE UP (ref 7.35–7.45)
PH BLDV: 7.35 — SIGNIFICANT CHANGE UP (ref 7.35–7.45)
PHOSPHATE SERPL-MCNC: 2.3 MG/DL — LOW (ref 2.5–4.5)
PLATELET # BLD AUTO: 180 K/UL — SIGNIFICANT CHANGE UP (ref 150–400)
PO2 BLDV: 56 MMHG — HIGH (ref 25–45)
PO2 BLDV: 56 MMHG — HIGH (ref 25–45)
PO2 BLDV: 65 MMHG — HIGH (ref 25–45)
POTASSIUM BLDV-SCNC: 3.9 MMOL/L — SIGNIFICANT CHANGE UP (ref 3.5–5)
POTASSIUM BLDV-SCNC: 4 MMOL/L — SIGNIFICANT CHANGE UP (ref 3.5–5)
POTASSIUM BLDV-SCNC: 4.3 MMOL/L — SIGNIFICANT CHANGE UP (ref 3.5–5)
POTASSIUM SERPL-MCNC: 3.6 MMOL/L — SIGNIFICANT CHANGE UP (ref 3.5–5.3)
POTASSIUM SERPL-SCNC: 3.6 MMOL/L — SIGNIFICANT CHANGE UP (ref 3.5–5.3)
PROT SERPL-MCNC: 4.8 G/DL — LOW (ref 6–8.3)
PROTHROM AB SERPL-ACNC: 15.9 SEC — HIGH (ref 10–12.9)
RBC # BLD: 3.91 M/UL — LOW (ref 4.2–5.8)
RBC # FLD: 13.7 % — SIGNIFICANT CHANGE UP (ref 10.3–14.5)
SAO2 % BLDV: 85 % — SIGNIFICANT CHANGE UP (ref 67–88)
SAO2 % BLDV: 86 % — SIGNIFICANT CHANGE UP (ref 67–88)
SAO2 % BLDV: 91 % — HIGH (ref 67–88)
SODIUM SERPL-SCNC: 142 MMOL/L — SIGNIFICANT CHANGE UP (ref 135–145)
TROPONIN T, HIGH SENSITIVITY RESULT: 504 NG/L — HIGH (ref 0–51)
WBC # BLD: 11.17 K/UL — HIGH (ref 3.8–10.5)
WBC # FLD AUTO: 11.17 K/UL — HIGH (ref 3.8–10.5)

## 2019-10-17 PROCEDURE — 33533 CABG ARTERIAL SINGLE: CPT | Mod: AS

## 2019-10-17 PROCEDURE — 99291 CRITICAL CARE FIRST HOUR: CPT

## 2019-10-17 PROCEDURE — 33518 CABG ARTERY-VEIN TWO: CPT | Mod: AS

## 2019-10-17 PROCEDURE — 93010 ELECTROCARDIOGRAM REPORT: CPT

## 2019-10-17 PROCEDURE — 33508 ENDOSCOPIC VEIN HARVEST: CPT | Mod: AS

## 2019-10-17 PROCEDURE — 71045 X-RAY EXAM CHEST 1 VIEW: CPT | Mod: 26

## 2019-10-17 PROCEDURE — 33508 ENDOSCOPIC VEIN HARVEST: CPT

## 2019-10-17 PROCEDURE — 33518 CABG ARTERY-VEIN TWO: CPT

## 2019-10-17 PROCEDURE — 33533 CABG ARTERIAL SINGLE: CPT

## 2019-10-17 RX ORDER — POTASSIUM CHLORIDE 20 MEQ
10 PACKET (EA) ORAL
Refills: 0 | Status: COMPLETED | OUTPATIENT
Start: 2019-10-17 | End: 2019-10-17

## 2019-10-17 RX ORDER — CHLORHEXIDINE GLUCONATE 213 G/1000ML
1 SOLUTION TOPICAL
Refills: 0 | Status: DISCONTINUED | OUTPATIENT
Start: 2019-10-17 | End: 2019-10-18

## 2019-10-17 RX ORDER — CALCIUM GLUCONATE 100 MG/ML
2 VIAL (ML) INTRAVENOUS ONCE
Refills: 0 | Status: COMPLETED | OUTPATIENT
Start: 2019-10-17 | End: 2019-10-17

## 2019-10-17 RX ORDER — DEXTROSE 50 % IN WATER 50 %
25 SYRINGE (ML) INTRAVENOUS ONCE
Refills: 0 | Status: DISCONTINUED | OUTPATIENT
Start: 2019-10-17 | End: 2019-10-21

## 2019-10-17 RX ORDER — CEFUROXIME AXETIL 250 MG
1500 TABLET ORAL EVERY 8 HOURS
Refills: 0 | Status: COMPLETED | OUTPATIENT
Start: 2019-10-17 | End: 2019-10-18

## 2019-10-17 RX ORDER — DEXTROSE 50 % IN WATER 50 %
15 SYRINGE (ML) INTRAVENOUS ONCE
Refills: 0 | Status: DISCONTINUED | OUTPATIENT
Start: 2019-10-17 | End: 2019-10-21

## 2019-10-17 RX ORDER — DOCUSATE SODIUM 100 MG
100 CAPSULE ORAL THREE TIMES A DAY
Refills: 0 | Status: DISCONTINUED | OUTPATIENT
Start: 2019-10-17 | End: 2019-10-21

## 2019-10-17 RX ORDER — ACETAMINOPHEN 500 MG
1000 TABLET ORAL ONCE
Refills: 0 | Status: COMPLETED | OUTPATIENT
Start: 2019-10-17 | End: 2019-10-17

## 2019-10-17 RX ORDER — ALBUMIN HUMAN 25 %
250 VIAL (ML) INTRAVENOUS
Refills: 0 | Status: COMPLETED | OUTPATIENT
Start: 2019-10-17 | End: 2019-10-17

## 2019-10-17 RX ORDER — SODIUM CHLORIDE 9 MG/ML
1000 INJECTION, SOLUTION INTRAVENOUS
Refills: 0 | Status: DISCONTINUED | OUTPATIENT
Start: 2019-10-17 | End: 2019-10-20

## 2019-10-17 RX ORDER — ATORVASTATIN CALCIUM 80 MG/1
40 TABLET, FILM COATED ORAL AT BEDTIME
Refills: 0 | Status: DISCONTINUED | OUTPATIENT
Start: 2019-10-17 | End: 2019-10-21

## 2019-10-17 RX ORDER — METOCLOPRAMIDE HCL 10 MG
10 TABLET ORAL EVERY 8 HOURS
Refills: 0 | Status: COMPLETED | OUTPATIENT
Start: 2019-10-17 | End: 2019-10-19

## 2019-10-17 RX ORDER — DEXTROSE 50 % IN WATER 50 %
12.5 SYRINGE (ML) INTRAVENOUS ONCE
Refills: 0 | Status: DISCONTINUED | OUTPATIENT
Start: 2019-10-17 | End: 2019-10-21

## 2019-10-17 RX ORDER — SODIUM CHLORIDE 9 MG/ML
1000 INJECTION INTRAMUSCULAR; INTRAVENOUS; SUBCUTANEOUS
Refills: 0 | Status: DISCONTINUED | OUTPATIENT
Start: 2019-10-17 | End: 2019-10-18

## 2019-10-17 RX ORDER — GLUCAGON INJECTION, SOLUTION 0.5 MG/.1ML
1 INJECTION, SOLUTION SUBCUTANEOUS ONCE
Refills: 0 | Status: DISCONTINUED | OUTPATIENT
Start: 2019-10-17 | End: 2019-10-21

## 2019-10-17 RX ORDER — POTASSIUM CHLORIDE 20 MEQ
10 PACKET (EA) ORAL
Refills: 0 | Status: DISCONTINUED | OUTPATIENT
Start: 2019-10-17 | End: 2019-10-18

## 2019-10-17 RX ORDER — ASPIRIN/CALCIUM CARB/MAGNESIUM 324 MG
325 TABLET ORAL DAILY
Refills: 0 | Status: DISCONTINUED | OUTPATIENT
Start: 2019-10-17 | End: 2019-10-18

## 2019-10-17 RX ORDER — OXYCODONE AND ACETAMINOPHEN 5; 325 MG/1; MG/1
1 TABLET ORAL EVERY 4 HOURS
Refills: 0 | Status: DISCONTINUED | OUTPATIENT
Start: 2019-10-17 | End: 2019-10-21

## 2019-10-17 RX ORDER — ASPIRIN/CALCIUM CARB/MAGNESIUM 324 MG
300 TABLET ORAL ONCE
Refills: 0 | Status: DISCONTINUED | OUTPATIENT
Start: 2019-10-17 | End: 2019-10-17

## 2019-10-17 RX ORDER — HYDROMORPHONE HYDROCHLORIDE 2 MG/ML
0.5 INJECTION INTRAMUSCULAR; INTRAVENOUS; SUBCUTANEOUS ONCE
Refills: 0 | Status: DISCONTINUED | OUTPATIENT
Start: 2019-10-17 | End: 2019-10-17

## 2019-10-17 RX ORDER — FAMOTIDINE 10 MG/ML
20 INJECTION INTRAVENOUS DAILY
Refills: 0 | Status: DISCONTINUED | OUTPATIENT
Start: 2019-10-17 | End: 2019-10-18

## 2019-10-17 RX ORDER — HYDROMORPHONE HYDROCHLORIDE 2 MG/ML
0.5 INJECTION INTRAMUSCULAR; INTRAVENOUS; SUBCUTANEOUS
Refills: 0 | Status: DISCONTINUED | OUTPATIENT
Start: 2019-10-17 | End: 2019-10-18

## 2019-10-17 RX ORDER — OXYCODONE AND ACETAMINOPHEN 5; 325 MG/1; MG/1
2 TABLET ORAL EVERY 6 HOURS
Refills: 0 | Status: DISCONTINUED | OUTPATIENT
Start: 2019-10-17 | End: 2019-10-21

## 2019-10-17 RX ORDER — PANTOPRAZOLE SODIUM 20 MG/1
40 TABLET, DELAYED RELEASE ORAL DAILY
Refills: 0 | Status: DISCONTINUED | OUTPATIENT
Start: 2019-10-17 | End: 2019-10-17

## 2019-10-17 RX ORDER — CHLORHEXIDINE GLUCONATE 213 G/1000ML
5 SOLUTION TOPICAL EVERY 4 HOURS
Refills: 0 | Status: DISCONTINUED | OUTPATIENT
Start: 2019-10-17 | End: 2019-10-17

## 2019-10-17 RX ORDER — INSULIN HUMAN 100 [IU]/ML
3 INJECTION, SOLUTION SUBCUTANEOUS
Qty: 100 | Refills: 0 | Status: DISCONTINUED | OUTPATIENT
Start: 2019-10-17 | End: 2019-10-18

## 2019-10-17 RX ORDER — NOREPINEPHRINE BITARTRATE/D5W 8 MG/250ML
0.02 PLASTIC BAG, INJECTION (ML) INTRAVENOUS
Qty: 8 | Refills: 0 | Status: DISCONTINUED | OUTPATIENT
Start: 2019-10-17 | End: 2019-10-18

## 2019-10-17 RX ORDER — HYDROMORPHONE HYDROCHLORIDE 2 MG/ML
0.5 INJECTION INTRAMUSCULAR; INTRAVENOUS; SUBCUTANEOUS ONCE
Refills: 0 | Status: DISCONTINUED | OUTPATIENT
Start: 2019-10-17 | End: 2019-10-18

## 2019-10-17 RX ORDER — MEPERIDINE HYDROCHLORIDE 50 MG/ML
25 INJECTION INTRAMUSCULAR; INTRAVENOUS; SUBCUTANEOUS ONCE
Refills: 0 | Status: DISCONTINUED | OUTPATIENT
Start: 2019-10-17 | End: 2019-10-17

## 2019-10-17 RX ADMIN — Medication 100 MILLIEQUIVALENT(S): at 15:30

## 2019-10-17 RX ADMIN — Medication 500 MILLILITER(S): at 14:50

## 2019-10-17 RX ADMIN — Medication 500 MILLILITER(S): at 14:47

## 2019-10-17 RX ADMIN — Medication 100 MILLIEQUIVALENT(S): at 16:00

## 2019-10-17 RX ADMIN — Medication 100 MILLIEQUIVALENT(S): at 20:27

## 2019-10-17 RX ADMIN — Medication 100 MILLIEQUIVALENT(S): at 13:58

## 2019-10-17 RX ADMIN — Medication 500 MILLILITER(S): at 13:10

## 2019-10-17 RX ADMIN — Medication 100 MILLIEQUIVALENT(S): at 20:00

## 2019-10-17 RX ADMIN — MUPIROCIN 1 APPLICATION(S): 20 OINTMENT TOPICAL at 05:51

## 2019-10-17 RX ADMIN — HYDROMORPHONE HYDROCHLORIDE 0.5 MILLIGRAM(S): 2 INJECTION INTRAMUSCULAR; INTRAVENOUS; SUBCUTANEOUS at 15:00

## 2019-10-17 RX ADMIN — Medication 500 MILLILITER(S): at 13:05

## 2019-10-17 RX ADMIN — HYDROMORPHONE HYDROCHLORIDE 0.5 MILLIGRAM(S): 2 INJECTION INTRAMUSCULAR; INTRAVENOUS; SUBCUTANEOUS at 14:45

## 2019-10-17 RX ADMIN — INSULIN HUMAN 3 UNIT(S)/HR: 100 INJECTION, SOLUTION SUBCUTANEOUS at 12:45

## 2019-10-17 RX ADMIN — ATORVASTATIN CALCIUM 40 MILLIGRAM(S): 80 TABLET, FILM COATED ORAL at 21:56

## 2019-10-17 RX ADMIN — Medication 2.79 MICROGRAM(S)/KG/MIN: at 12:47

## 2019-10-17 RX ADMIN — Medication 325 MILLIGRAM(S): at 18:11

## 2019-10-17 RX ADMIN — HYDROMORPHONE HYDROCHLORIDE 0.5 MILLIGRAM(S): 2 INJECTION INTRAMUSCULAR; INTRAVENOUS; SUBCUTANEOUS at 20:27

## 2019-10-17 RX ADMIN — Medication 12.5 MILLIGRAM(S): at 05:50

## 2019-10-17 RX ADMIN — Medication 100 MILLIEQUIVALENT(S): at 13:57

## 2019-10-17 RX ADMIN — Medication 1000 MILLIGRAM(S): at 18:00

## 2019-10-17 RX ADMIN — Medication 10 MILLIGRAM(S): at 21:56

## 2019-10-17 RX ADMIN — Medication 100 MILLIGRAM(S): at 16:41

## 2019-10-17 RX ADMIN — Medication 100 MILLIEQUIVALENT(S): at 15:11

## 2019-10-17 RX ADMIN — CHLORHEXIDINE GLUCONATE 15 MILLILITER(S): 213 SOLUTION TOPICAL at 05:50

## 2019-10-17 RX ADMIN — HYDROMORPHONE HYDROCHLORIDE 0.5 MILLIGRAM(S): 2 INJECTION INTRAMUSCULAR; INTRAVENOUS; SUBCUTANEOUS at 19:37

## 2019-10-17 RX ADMIN — Medication 100 MILLIEQUIVALENT(S): at 16:30

## 2019-10-17 RX ADMIN — Medication 100 MILLIEQUIVALENT(S): at 19:18

## 2019-10-17 RX ADMIN — Medication 10 MILLIGRAM(S): at 15:43

## 2019-10-17 RX ADMIN — Medication 400 MILLIGRAM(S): at 17:30

## 2019-10-17 RX ADMIN — CHLORHEXIDINE GLUCONATE 5 MILLILITER(S): 213 SOLUTION TOPICAL at 14:48

## 2019-10-17 RX ADMIN — Medication 100 MILLIGRAM(S): at 21:56

## 2019-10-17 NOTE — PROGRESS NOTE ADULT - SUBJECTIVE AND OBJECTIVE BOX
CHEVY AMARAL  MRN#:  [id number]    The patient is a 69y Male with HTN, HLD, glucose intolerance, with recent unstable angina, found to have multivessel CAD, now recovering s/p C3L with observed ejection fraction of 35%, who was seen, evaluated, & examined with the CTICU staff on rounds and later in the evening with a multidisciplinary care plan formulated & implemented.  All available clinical, laboratory, radiographic, pharmacologic, and electrocardiographic data were reviewed & analyzed.      The patient was in the CTICU in critical condition at risk for imminent decompensation secondary to persistent cardiopulmonary dysfunction, cardiovascular dysfunction, hypovolemic shock, hemodynamically significant bradycardia, and stress hyperglycemia.      Respiratory status required full ventilatory support, close monitoring of respiratory rate and breathing pattern, the following of ABG’s with A-line monitoring, continuous pulse oximetry monitoring, and intermittent IV Dilaudid for support & to evaluate for & prevent further decompensation secondary to persistent cardiopulmonary dysfunction and anticipated cardiogenic shock. Based upon my evaluation and review, I weaned to pressure support and extubated the patient.     Invasive hemodynamic monitoring with a central venous catheter & an A-line were required for the continuous central venous and MAP/BP monitoring to ensure adequate cardiovascular support and to evaluate for & help prevent decompensation while receiving intermittent volume expansion, external epicardial pacing, and an IV Levophed drip secondary to anticipated cardiogenic shock, hypovolemic shock, hemodynamically significant bradycardia, and acute postoperative blood loss anemia. Based upon my evaluation and review, I maintained the current vasopressor therapy titrated to blood pressure and ordered additional fluid therapy.    Metabolic stability, stress hyperglycemia, & infection prophylaxis required an IV regular Insulin drip & the following of serial glucose levels to help achieve & maintain euglycemia. Based upon my evaluation and review, I maintained the current metabolic therapy.     Patient required acute postoperative critical care management and it at risk for life threatening decompensation. I provided 30 minutes of non-continuous care to the patient. CHEVY AMARAL  MRN#:  [id number]    The patient is a 69y Male with HTN, HLD, glucose intolerance, with recent unstable angina, found to have multivessel CAD, now recovering s/p C3L with observed ejection fraction of 35%, who was seen, evaluated, & examined with the CTICU staff on rounds and later in the evening with a multidisciplinary care plan formulated & implemented.  All available clinical, laboratory, radiographic, pharmacologic, and electrocardiographic data were reviewed & analyzed.      The patient was in the CTICU in critical condition at risk for imminent decompensation secondary to persistent cardiopulmonary dysfunction, chronic systolic heart failure, hypovolemic shock, hemodynamically significant bradycardia, and stress hyperglycemia.      Respiratory status required full ventilatory support, close monitoring of respiratory rate and breathing pattern, the following of ABG’s with A-line monitoring, continuous pulse oximetry monitoring, and intermittent IV Dilaudid for support & to evaluate for & prevent further decompensation secondary to persistent cardiopulmonary dysfunction, chronic systolic heart failure and anticipated cardiogenic shock. Based upon my evaluation and review, I weaned to pressure support and extubated the patient.     Invasive hemodynamic monitoring with a central venous catheter & an A-line were required for the continuous central venous and MAP/BP monitoring to ensure adequate cardiovascular support and to evaluate for & help prevent decompensation while receiving intermittent volume expansion, external epicardial pacing, and an IV Levophed drip secondary to chronic systolic heart failure, anticipated cardiogenic shock, hypovolemic shock, hemodynamically significant bradycardia, and acute postoperative blood loss anemia. Based upon my evaluation and review, I maintained the current vasopressor therapy titrated to blood pressure and ordered additional fluid therapy.    Metabolic stability, stress hyperglycemia, & infection prophylaxis required an IV regular Insulin drip & the following of serial glucose levels to help achieve & maintain euglycemia. Based upon my evaluation and review, I maintained the current metabolic therapy.     Patient required acute postoperative critical care management and it at risk for life threatening decompensation. I provided 30 minutes of non-continuous care to the patient.

## 2019-10-17 NOTE — BRIEF OPERATIVE NOTE - NSICDXBRIEFPROCEDURE_GEN_ALL_CORE_FT
PROCEDURES:  CABG, with JOHANNE 17-Oct-2019 13:26:42 CABGx3 (LIMA-LAD, SVG-OM, SVG-RCA) Zainab Rios D

## 2019-10-17 NOTE — AIRWAY REMOVAL NOTE  ADULT & PEDS - ARTIFICAL AIRWAY REMOVAL COMMENTS
Written order for extubation verified. Pt was identified by full name and birthday compared to ID band.  Present during the procedure was yelena MELVIN

## 2019-10-17 NOTE — PRE-OP CHECKLIST - SELECT TESTS ORDERED
BMP/CBC/CMP/PT/PTT/INR/Hepatic Function/Type and Screen/EKG/CXR/Results in MD note/POCT Blood Glucose 96/BMP/CBC/CMP/PT/PTT/INR/Hepatic Function/Type and Screen/EKG/CXR/Results in MD note/POCT Blood Glucose

## 2019-10-18 DIAGNOSIS — Z95.1 PRESENCE OF AORTOCORONARY BYPASS GRAFT: ICD-10-CM

## 2019-10-18 LAB
ALBUMIN SERPL ELPH-MCNC: 3.8 G/DL — SIGNIFICANT CHANGE UP (ref 3.3–5)
ALP SERPL-CCNC: 40 U/L — SIGNIFICANT CHANGE UP (ref 40–120)
ALT FLD-CCNC: 18 U/L — SIGNIFICANT CHANGE UP (ref 10–45)
ANION GAP SERPL CALC-SCNC: 13 MMOL/L — SIGNIFICANT CHANGE UP (ref 5–17)
APTT BLD: 28.1 SEC — SIGNIFICANT CHANGE UP (ref 27.5–36.3)
AST SERPL-CCNC: 51 U/L — HIGH (ref 10–40)
BILIRUB SERPL-MCNC: 0.7 MG/DL — SIGNIFICANT CHANGE UP (ref 0.2–1.2)
BUN SERPL-MCNC: 19 MG/DL — SIGNIFICANT CHANGE UP (ref 7–23)
CALCIUM SERPL-MCNC: 8.7 MG/DL — SIGNIFICANT CHANGE UP (ref 8.4–10.5)
CHLORIDE SERPL-SCNC: 108 MMOL/L — SIGNIFICANT CHANGE UP (ref 96–108)
CO2 SERPL-SCNC: 21 MMOL/L — LOW (ref 22–31)
CREAT SERPL-MCNC: 0.63 MG/DL — SIGNIFICANT CHANGE UP (ref 0.5–1.3)
GAS PNL BLDA: SIGNIFICANT CHANGE UP
GAS PNL BLDA: SIGNIFICANT CHANGE UP
GLUCOSE BLDC GLUCOMTR-MCNC: 118 MG/DL — HIGH (ref 70–99)
GLUCOSE BLDC GLUCOMTR-MCNC: 125 MG/DL — HIGH (ref 70–99)
GLUCOSE BLDC GLUCOMTR-MCNC: 129 MG/DL — HIGH (ref 70–99)
GLUCOSE BLDC GLUCOMTR-MCNC: 129 MG/DL — HIGH (ref 70–99)
GLUCOSE BLDC GLUCOMTR-MCNC: 137 MG/DL — HIGH (ref 70–99)
GLUCOSE BLDC GLUCOMTR-MCNC: 158 MG/DL — HIGH (ref 70–99)
GLUCOSE BLDC GLUCOMTR-MCNC: 163 MG/DL — HIGH (ref 70–99)
GLUCOSE BLDC GLUCOMTR-MCNC: 91 MG/DL — SIGNIFICANT CHANGE UP (ref 70–99)
GLUCOSE BLDC GLUCOMTR-MCNC: 95 MG/DL — SIGNIFICANT CHANGE UP (ref 70–99)
GLUCOSE SERPL-MCNC: 157 MG/DL — HIGH (ref 70–99)
HCT VFR BLD CALC: 30.1 % — LOW (ref 39–50)
HGB BLD-MCNC: 9.8 G/DL — LOW (ref 13–17)
INR BLD: 1.29 RATIO — HIGH (ref 0.88–1.16)
MAGNESIUM SERPL-MCNC: 2 MG/DL — SIGNIFICANT CHANGE UP (ref 1.6–2.6)
MCHC RBC-ENTMCNC: 28.7 PG — SIGNIFICANT CHANGE UP (ref 27–34)
MCHC RBC-ENTMCNC: 32.6 GM/DL — SIGNIFICANT CHANGE UP (ref 32–36)
MCV RBC AUTO: 88 FL — SIGNIFICANT CHANGE UP (ref 80–100)
NRBC # BLD: 0 /100 WBCS — SIGNIFICANT CHANGE UP (ref 0–0)
PHOSPHATE SERPL-MCNC: 2.9 MG/DL — SIGNIFICANT CHANGE UP (ref 2.5–4.5)
PLATELET # BLD AUTO: 166 K/UL — SIGNIFICANT CHANGE UP (ref 150–400)
POTASSIUM SERPL-MCNC: 4.7 MMOL/L — SIGNIFICANT CHANGE UP (ref 3.5–5.3)
POTASSIUM SERPL-SCNC: 4.7 MMOL/L — SIGNIFICANT CHANGE UP (ref 3.5–5.3)
PROT SERPL-MCNC: 5.8 G/DL — LOW (ref 6–8.3)
PROTHROM AB SERPL-ACNC: 14.8 SEC — HIGH (ref 10–12.9)
RBC # BLD: 3.42 M/UL — LOW (ref 4.2–5.8)
RBC # FLD: 13.8 % — SIGNIFICANT CHANGE UP (ref 10.3–14.5)
SODIUM SERPL-SCNC: 142 MMOL/L — SIGNIFICANT CHANGE UP (ref 135–145)
WBC # BLD: 11.54 K/UL — HIGH (ref 3.8–10.5)
WBC # FLD AUTO: 11.54 K/UL — HIGH (ref 3.8–10.5)

## 2019-10-18 PROCEDURE — 99233 SBSQ HOSP IP/OBS HIGH 50: CPT

## 2019-10-18 PROCEDURE — 71045 X-RAY EXAM CHEST 1 VIEW: CPT | Mod: 26

## 2019-10-18 PROCEDURE — 93010 ELECTROCARDIOGRAM REPORT: CPT

## 2019-10-18 RX ORDER — ASPIRIN/CALCIUM CARB/MAGNESIUM 324 MG
81 TABLET ORAL DAILY
Refills: 0 | Status: DISCONTINUED | OUTPATIENT
Start: 2019-10-19 | End: 2019-10-21

## 2019-10-18 RX ORDER — METOPROLOL TARTRATE 50 MG
25 TABLET ORAL
Refills: 0 | Status: DISCONTINUED | OUTPATIENT
Start: 2019-10-18 | End: 2019-10-18

## 2019-10-18 RX ORDER — HYDROMORPHONE HYDROCHLORIDE 2 MG/ML
0.5 INJECTION INTRAMUSCULAR; INTRAVENOUS; SUBCUTANEOUS ONCE
Refills: 0 | Status: DISCONTINUED | OUTPATIENT
Start: 2019-10-18 | End: 2019-10-18

## 2019-10-18 RX ORDER — FAMOTIDINE 10 MG/ML
20 INJECTION INTRAVENOUS DAILY
Refills: 0 | Status: DISCONTINUED | OUTPATIENT
Start: 2019-10-18 | End: 2019-10-21

## 2019-10-18 RX ORDER — SODIUM CHLORIDE 9 MG/ML
3 INJECTION INTRAMUSCULAR; INTRAVENOUS; SUBCUTANEOUS EVERY 8 HOURS
Refills: 0 | Status: DISCONTINUED | OUTPATIENT
Start: 2019-10-18 | End: 2019-10-21

## 2019-10-18 RX ORDER — INSULIN LISPRO 100/ML
VIAL (ML) SUBCUTANEOUS AT BEDTIME
Refills: 0 | Status: DISCONTINUED | OUTPATIENT
Start: 2019-10-18 | End: 2019-10-21

## 2019-10-18 RX ORDER — INSULIN LISPRO 100/ML
VIAL (ML) SUBCUTANEOUS
Refills: 0 | Status: DISCONTINUED | OUTPATIENT
Start: 2019-10-18 | End: 2019-10-21

## 2019-10-18 RX ORDER — METOPROLOL TARTRATE 50 MG
25 TABLET ORAL EVERY 8 HOURS
Refills: 0 | Status: DISCONTINUED | OUTPATIENT
Start: 2019-10-18 | End: 2019-10-19

## 2019-10-18 RX ORDER — HEPARIN SODIUM 5000 [USP'U]/ML
5000 INJECTION INTRAVENOUS; SUBCUTANEOUS EVERY 8 HOURS
Refills: 0 | Status: DISCONTINUED | OUTPATIENT
Start: 2019-10-18 | End: 2019-10-21

## 2019-10-18 RX ADMIN — FAMOTIDINE 20 MILLIGRAM(S): 10 INJECTION INTRAVENOUS at 11:54

## 2019-10-18 RX ADMIN — Medication 100 MILLIGRAM(S): at 13:02

## 2019-10-18 RX ADMIN — Medication 25 MILLIGRAM(S): at 13:02

## 2019-10-18 RX ADMIN — Medication 1: at 11:59

## 2019-10-18 RX ADMIN — ATORVASTATIN CALCIUM 40 MILLIGRAM(S): 80 TABLET, FILM COATED ORAL at 21:33

## 2019-10-18 RX ADMIN — Medication 10 MILLIGRAM(S): at 06:13

## 2019-10-18 RX ADMIN — OXYCODONE AND ACETAMINOPHEN 2 TABLET(S): 5; 325 TABLET ORAL at 21:31

## 2019-10-18 RX ADMIN — Medication 10 MILLIGRAM(S): at 13:03

## 2019-10-18 RX ADMIN — Medication 100 MILLIGRAM(S): at 23:42

## 2019-10-18 RX ADMIN — CHLORHEXIDINE GLUCONATE 1 APPLICATION(S): 213 SOLUTION TOPICAL at 04:27

## 2019-10-18 RX ADMIN — Medication 10 MILLIGRAM(S): at 21:35

## 2019-10-18 RX ADMIN — HEPARIN SODIUM 5000 UNIT(S): 5000 INJECTION INTRAVENOUS; SUBCUTANEOUS at 21:52

## 2019-10-18 RX ADMIN — Medication 100 MILLIGRAM(S): at 08:06

## 2019-10-18 RX ADMIN — OXYCODONE AND ACETAMINOPHEN 2 TABLET(S): 5; 325 TABLET ORAL at 16:10

## 2019-10-18 RX ADMIN — HEPARIN SODIUM 5000 UNIT(S): 5000 INJECTION INTRAVENOUS; SUBCUTANEOUS at 13:02

## 2019-10-18 RX ADMIN — Medication 325 MILLIGRAM(S): at 11:54

## 2019-10-18 RX ADMIN — HYDROMORPHONE HYDROCHLORIDE 0.5 MILLIGRAM(S): 2 INJECTION INTRAMUSCULAR; INTRAVENOUS; SUBCUTANEOUS at 07:00

## 2019-10-18 RX ADMIN — Medication 25 MILLIGRAM(S): at 06:13

## 2019-10-18 RX ADMIN — HYDROMORPHONE HYDROCHLORIDE 0.5 MILLIGRAM(S): 2 INJECTION INTRAMUSCULAR; INTRAVENOUS; SUBCUTANEOUS at 08:05

## 2019-10-18 RX ADMIN — Medication 100 MILLIGRAM(S): at 01:00

## 2019-10-18 RX ADMIN — Medication 25 MILLIGRAM(S): at 21:52

## 2019-10-18 RX ADMIN — HYDROMORPHONE HYDROCHLORIDE 0.5 MILLIGRAM(S): 2 INJECTION INTRAMUSCULAR; INTRAVENOUS; SUBCUTANEOUS at 08:20

## 2019-10-18 RX ADMIN — Medication 100 MILLIGRAM(S): at 21:34

## 2019-10-18 RX ADMIN — OXYCODONE AND ACETAMINOPHEN 2 TABLET(S): 5; 325 TABLET ORAL at 15:39

## 2019-10-18 RX ADMIN — HYDROMORPHONE HYDROCHLORIDE 0.5 MILLIGRAM(S): 2 INJECTION INTRAMUSCULAR; INTRAVENOUS; SUBCUTANEOUS at 02:38

## 2019-10-18 RX ADMIN — Medication 100 MILLIGRAM(S): at 06:13

## 2019-10-18 RX ADMIN — Medication 100 MILLIGRAM(S): at 15:33

## 2019-10-18 RX ADMIN — OXYCODONE AND ACETAMINOPHEN 2 TABLET(S): 5; 325 TABLET ORAL at 22:12

## 2019-10-18 RX ADMIN — SODIUM CHLORIDE 3 MILLILITER(S): 9 INJECTION INTRAMUSCULAR; INTRAVENOUS; SUBCUTANEOUS at 21:39

## 2019-10-18 NOTE — PROGRESS NOTE ADULT - PROBLEM SELECTOR PLAN 1
Continue with ASA 81 mg PO Daily.   Continue with Lopressor 25 mg PO TID.   Continue with Atorvastatin 40 mg PO HS   Maintain left pleural CT -->Pleural vac --> LWS   Chest X-ray in AM   Maintain PW --> EPM --> VVI 40 / Vma 13   Pain Management   Increase activity as tolerated.   Encourage Chest PT / Pulmonary toileting and Incentive spirometry every 1 hour x 10 while awake.  Continue with PUD and DVT prophylaxis.   Shower on POD #5.   D/C plan home PT once medically cleared   Plan of care discussed with attending

## 2019-10-18 NOTE — PHYSICAL THERAPY INITIAL EVALUATION ADULT - PERTINENT HX OF CURRENT PROBLEM, REHAB EVAL
Pt is a 69yoM former smoker,impaired glucose tolerance, HTN, HLD presents to [facility] for chest pain. Pt had chest pain associated with diaphoresis and with sob when occurring. The pt does not take medications and has had poor follow up with clinic with only visit in Feb 2019. There is no history of CAD in first degree relatives and the pt has not taken aspirin in the last 7d. Pt found to have TVD. Pt is now s/p CABGx3.

## 2019-10-18 NOTE — PHYSICAL THERAPY INITIAL EVALUATION ADULT - ADDITIONAL COMMENTS
PTA pt was independent with functional mobility and ADLs without AD. Pt lives in a PH with his wife and son. Pt has 1 step to enter no steps inside

## 2019-10-18 NOTE — PROGRESS NOTE ADULT - SUBJECTIVE AND OBJECTIVE BOX
CRITICAL CARE ATTENDING - CTICU    MEDICATIONS  (STANDING):  aspirin enteric coated 325 milliGRAM(s) Oral daily  atorvastatin 40 milliGRAM(s) Oral at bedtime  cefuroxime  IVPB 1500 milliGRAM(s) IV Intermittent every 8 hours  chlorhexidine 2% Cloths 1 Application(s) Topical <User Schedule>  dextrose 5%. 1000 milliLiter(s) (50 mL/Hr) IV Continuous <Continuous>  dextrose 50% Injectable 12.5 Gram(s) IV Push once  dextrose 50% Injectable 25 Gram(s) IV Push once  dextrose 50% Injectable 25 Gram(s) IV Push once  docusate sodium 100 milliGRAM(s) Oral three times a day  famotidine    Tablet 20 milliGRAM(s) Oral daily  heparin  Injectable 5000 Unit(s) SubCutaneous every 8 hours  HYDROmorphone  Injectable 0.5 milliGRAM(s) IV Push once  HYDROmorphone  Injectable 0.5 milliGRAM(s) IV Push five times a day  insulin lispro (HumaLOG) corrective regimen sliding scale   SubCutaneous three times a day before meals  insulin lispro (HumaLOG) corrective regimen sliding scale   SubCutaneous at bedtime  metoclopramide Injectable 10 milliGRAM(s) IV Push every 8 hours  metoprolol tartrate 25 milliGRAM(s) Oral two times a day  potassium chloride  10 mEq/50 mL IVPB 10 milliEquivalent(s) IV Intermittent every 1 hour  potassium chloride  10 mEq/50 mL IVPB 10 milliEquivalent(s) IV Intermittent every 1 hour  potassium chloride  10 mEq/50 mL IVPB 10 milliEquivalent(s) IV Intermittent every 1 hour  sodium chloride 0.9%. 1000 milliLiter(s) (10 mL/Hr) IV Continuous <Continuous>                                    9.8    11.54 )-----------( 166      ( 18 Oct 2019 01:28 )             30.1       10-18    142  |  108  |  19  ----------------------------<  157<H>  4.7   |  21<L>  |  0.63    Ca    8.7      18 Oct 2019 01:28  Phos  2.9     10-18  Mg     2.0     10-18    TPro  5.8<L>  /  Alb  3.8  /  TBili  0.7  /  DBili  x   /  AST  51<H>  /  ALT  18  /  AlkPhos  40  10-18      PT/INR - ( 18 Oct 2019 01:28 )   PT: 14.8 sec;   INR: 1.29 ratio         PTT - ( 18 Oct 2019 01:28 )  PTT:28.1 sec    Mode: CPAP with PS  FiO2: 40  PEEP: 5  PS: 5      Daily Height in cm: 177.8 (17 Oct 2019 07:21)    Daily Weight in k.7 (18 Oct 2019 00:00)      10-16 @ 07:  -  10-17 @ 07:00  --------------------------------------------------------  IN: 680 mL / OUT: 200 mL / NET: 480 mL    10-17 @ 07:01  -  10-18 @ 06:53  --------------------------------------------------------  IN: 1775.6 mL / OUT: 1440 mL / NET: 335.6 mL        Critically Ill patient  : [ ] preoperative ,   [ x] post operative    Requires :  [x ] Arterial Line   [x ] Central Line  [ ] PA catheter  [ ] IABP  [ ] ECMO  [ ] LVAD  [ ] Ventilator  [ x] pacemaker [ ] Impella.                      [ x] ABG's     [ x] Pulse Oxymetry Monitoring  Bedside evaluation , monitoring , treatment of hemodynamics , fluids , IVP/ IVCD meds.        Diagnosis:     POD 1 - C3L     CAD    CHF- acute [x ]   chronic [x ]    systolic [ x]   diatolic [ ]          - Echo- EF -  35%           [ ] RV dysfunction          - Cxr-cardiomegally, edema          - Clinical-  [ ]inotropes   [ ]pressors   [ ]diuresis   [ ]IABP   [ ]ECMO   [ ]LVAD   [ x]Respiratory Failure    Temporary pacemaker (TPM) interrogation and setting.     Hyperglycemia    respiratory failure    IVCD anticoagulation with [x ] Heparin  [ ] Argatroban for     HTN    HLD    Former Smoker (quit )                    Discussed with CT surgeon, Physician's Assistant - Nurse Practitioner- Critical care medicine team.   Dicussed at  AM / PM rounds.   Chart, labs , films reviewed.    Total Time: CRITICAL CARE ATTENDING - CTICU    MEDICATIONS  (STANDING):  aspirin enteric coated 325 milliGRAM(s) Oral daily  atorvastatin 40 milliGRAM(s) Oral at bedtime  cefuroxime  IVPB 1500 milliGRAM(s) IV Intermittent every 8 hours  chlorhexidine 2% Cloths 1 Application(s) Topical <User Schedule>  dextrose 5%. 1000 milliLiter(s) (50 mL/Hr) IV Continuous <Continuous>  dextrose 50% Injectable 12.5 Gram(s) IV Push once  dextrose 50% Injectable 25 Gram(s) IV Push once  dextrose 50% Injectable 25 Gram(s) IV Push once  docusate sodium 100 milliGRAM(s) Oral three times a day  famotidine    Tablet 20 milliGRAM(s) Oral daily  heparin  Injectable 5000 Unit(s) SubCutaneous every 8 hours  HYDROmorphone  Injectable 0.5 milliGRAM(s) IV Push once  HYDROmorphone  Injectable 0.5 milliGRAM(s) IV Push five times a day  insulin lispro (HumaLOG) corrective regimen sliding scale   SubCutaneous three times a day before meals  insulin lispro (HumaLOG) corrective regimen sliding scale   SubCutaneous at bedtime  metoclopramide Injectable 10 milliGRAM(s) IV Push every 8 hours  metoprolol tartrate 25 milliGRAM(s) Oral two times a day  potassium chloride  10 mEq/50 mL IVPB 10 milliEquivalent(s) IV Intermittent every 1 hour  potassium chloride  10 mEq/50 mL IVPB 10 milliEquivalent(s) IV Intermittent every 1 hour  potassium chloride  10 mEq/50 mL IVPB 10 milliEquivalent(s) IV Intermittent every 1 hour  sodium chloride 0.9%. 1000 milliLiter(s) (10 mL/Hr) IV Continuous <Continuous>                                    9.8    11.54 )-----------( 166      ( 18 Oct 2019 01:28 )             30.1       10-18    142  |  108  |  19  ----------------------------<  157<H>  4.7   |  21<L>  |  0.63    Ca    8.7      18 Oct 2019 01:28  Phos  2.9     10-18  Mg     2.0     10-18    TPro  5.8<L>  /  Alb  3.8  /  TBili  0.7  /  DBili  x   /  AST  51<H>  /  ALT  18  /  AlkPhos  40  10-18      PT/INR - ( 18 Oct 2019 01:28 )   PT: 14.8 sec;   INR: 1.29 ratio         PTT - ( 18 Oct 2019 01:28 )  PTT:28.1 sec    Mode: CPAP with PS  FiO2: 40  PEEP: 5  PS: 5      Daily Height in cm: 177.8 (17 Oct 2019 07:21)    Daily Weight in k.7 (18 Oct 2019 00:00)      10-16 @ 07:  -  10-17 @ 07:00  --------------------------------------------------------  IN: 680 mL / OUT: 200 mL / NET: 480 mL    10-17 @ 07:01  -  10-18 @ 06:53  --------------------------------------------------------  IN: 1775.6 mL / OUT: 1440 mL / NET: 335.6 mL        Critically Ill patient  : [ ] preoperative ,   [ x] post operative    Requires :  [x ] Arterial Line   [x ] Central Line  [ ] PA catheter  [ ] IABP  [ ] ECMO  [ ] LVAD  [ ] Ventilator  [ x] pacemaker [ ] Impella.                      [ x] ABG's     [ x] Pulse Oxymetry Monitoring  Bedside evaluation , monitoring , treatment of hemodynamics , fluids , IVP/ IVCD meds.        Diagnosis:     POD 1 - CABG X 3 L    CAD    CHF- acute [x ]   chronic [x ]    systolic [ x]   diatolic [ ]          - Echo- EF -  35%           [ ] RV dysfunction          - Cxr-cardiomegally, edema          - Clinical-  [ ]inotropes   [ ]pressors   [x ]diuresis   [ ]IABP   [ ]ECMO   [ ]LVAD   [ ]Respiratory Failure    Temporary pacemaker (TPM) interrogation and setting.     Insulin Humalog coverage    HTN    HLD    Former Smoker     ECG     Chest Tube Drainage     Requires bedside physical therapy, mobilization and total assisted care.     ASA / Lopressor                     Discussed with CT surgeon, Physician's Assistant - Nurse Practitioner- Critical care medicine team.   Dicussed at  AM / PM rounds.   Chart, labs , films reviewed.    Total Time: 20 min

## 2019-10-18 NOTE — PHYSICAL THERAPY INITIAL EVALUATION ADULT - PLANNED THERAPY INTERVENTIONS, PT EVAL
GOAL: Pt will negotiate 1 flight of steps independently in 2 weeks/balance training/bed mobility training/gait training/strengthening/transfer training

## 2019-10-18 NOTE — PROGRESS NOTE ADULT - SUBJECTIVE AND OBJECTIVE BOX
VITAL SIGNS    Subjective: "I'm feeling ok." Denies CP, palpitation, SOB, HORTA, HA, dizziness, N/V/D, fever or chills.  No acute event noted overnight.      Telemetry: NSR 60-80     Vital Signs Last 24 Hrs  T(C): 36.4 (10-18-19 @ 16:59), Max: 37 (10-18-19 @ 04:00)  T(F): 97.5 (10-18-19 @ 16:59), Max: 98.6 (10-18-19 @ 04:00)  HR: 68 (10-18-19 @ 16:59) (62 - 77)  BP: 126/68 (10-18-19 @ 16:59) (108/64 - 145/77)  RR: 22 (10-18-19 @ 16:59) (14 - 34)  SpO2: 99% (10-18-19 @ 16:59) (97% - 100%)           10-17 @ 07:01  -  10-18 @ 07:00  --------------------------------------------------------  IN: 1785.6 mL / OUT: 1480 mL / NET: 305.6 mL    10-18 @ 07:01  -  10-18 @ 18:30  --------------------------------------------------------  IN: 900 mL / OUT: 300 mL / NET: 600 mL    Daily     Daily Weight in k.7 (18 Oct 2019 00:00)    CAPILLARY BLOOD GLUCOSE  129 (18 Oct 2019 08:00)  138 (18 Oct 2019 07:00)  129 (18 Oct 2019 06:00)  95 (18 Oct 2019 05:00)  91 (18 Oct 2019 04:00)  118 (18 Oct 2019 03:00)  125 (18 Oct 2019 02:00)  146 (18 Oct 2019 01:00)  143 (18 Oct 2019 00:00)  137 (17 Oct 2019 23:00)  121 (17 Oct 2019 21:00)  96 (17 Oct 2019 20:00)    POCT Blood Glucose.: 129 mg/dL (18 Oct 2019 17:31)  POCT Blood Glucose.: 158 mg/dL (18 Oct 2019 11:58)  POCT Blood Glucose.: 129 mg/dL (18 Oct 2019 07:43)  POCT Blood Glucose.: 137 mg/dL (18 Oct 2019 06:49)  POCT Blood Glucose.: 95 mg/dL (18 Oct 2019 04:51)  POCT Blood Glucose.: 91 mg/dL (18 Oct 2019 04:05)  POCT Blood Glucose.: 118 mg/dL (18 Oct 2019 02:53)  POCT Blood Glucose.: 125 mg/dL (18 Oct 2019 02:06)  POCT Blood Glucose.: 143 mg/dL (17 Oct 2019 23:47)  POCT Blood Glucose.: 137 mg/dL (17 Oct 2019 22:50)  POCT Blood Glucose.: 129 mg/dL (17 Oct 2019 22:07)  POCT Blood Glucose.: 101 mg/dL (17 Oct 2019 20:30)  POCT Blood Glucose.: 96 mg/dL (17 Oct 2019 20:02)  POCT Blood Glucose.: 113 mg/dL (17 Oct 2019 19:15)     PHYSICAL EXAM    Neurology: alert and oriented x 3, nonfocal, no gross deficits    CV: (+) S1 and S2, No murmurs, rubs, gallops or clicks     Sternal Wound:  MSI -->CDI sternum stable; Left pleural CT --> pleural VAC --> negative calf tenderness.  Negative air leak. PW --> EPM VVI 40 / Vma 13     Lungs: CTA B/L     Abdomen: soft, nontender, nondistended, positive bowel sounds, (+) Flatus; (-) BM     :  Voiding               Extremities:  B/L LE (+) trace edema; negative calf tenderness; (+) 2 DP palpable        atorvastatin 40 milliGRAM(s) Oral at bedtime  cefuroxime  IVPB 1500 milliGRAM(s) IV Intermittent every 8 hours  docusate sodium 100 milliGRAM(s) Oral three times a day  famotidine Tablet 20 milliGRAM(s) Oral daily  glucagon  Injectable 1 milliGRAM(s) IntraMuscular once PRN  heparin Injectable 5000 Unit(s) SubCutaneous every 8 hours  insulin lispro (HumaLOG) corrective regimen sliding scale SubCutaneous three times a day before meals  insulin lispro (HumaLOG) corrective regimen sliding scale  SubCutaneous at bedtime  metoclopramide Injectable 10 milliGRAM(s) IV Push every 8 hours  metoprolol tartrate 25 milliGRAM(s) Oral every 8 hours  oxyCODONE 5 mG/acetaminophen 325 mG 1 Tablet(s) Oral every 4 hours PRN  oxyCODONE  5 mG/acetaminophen 325 mG 2 Tablet(s) Oral every 6 hours PRN    Physical Therapy Rec:   Home  [  ]   Home w/ PT  [X ]  Rehab  [  ]    Discussed with Cardiothoracic Team at AM rounds.

## 2019-10-18 NOTE — PROGRESS NOTE ADULT - ASSESSMENT
70 y/o male former smoker, with PMHx of HTN, HLD who presents to [facility] with c/o chest pain x3 days radiating to neck rated as severe, sharp intermittent at rest, associated with diaphoresis and with sob when occurring. Troponin 54 then trending down, pt was Brilinta loaded and started on Heparin infusion, now s/p cardiac cath demonstrating 3VD. TTE: EF37%, mild MR, no pericardial effusion. CT Surgery consulted for CABG evaluation.  On 10/17/19 s/p CABG x 3 LIMA (LIMA-LAD, SVG-OM, SVG-RCA) EF 35%   Postop Course: Extubated POD # 0.   Pressor support required à weaned off.   Hyperglycemia à Insulin gtt à weaned off   10/18 VVS; Transferred to 2 [facility] floor à Left pleural CT remains in place à pleural vac à LWS   Disposition: Home PT once medically cleared.

## 2019-10-19 LAB
ANION GAP SERPL CALC-SCNC: 11 MMOL/L — SIGNIFICANT CHANGE UP (ref 5–17)
ANION GAP SERPL CALC-SCNC: 12 MMOL/L — SIGNIFICANT CHANGE UP (ref 5–17)
BASOPHILS # BLD AUTO: 0.02 K/UL — SIGNIFICANT CHANGE UP (ref 0–0.2)
BASOPHILS NFR BLD AUTO: 0.1 % — SIGNIFICANT CHANGE UP (ref 0–2)
BUN SERPL-MCNC: 22 MG/DL — SIGNIFICANT CHANGE UP (ref 7–23)
BUN SERPL-MCNC: 22 MG/DL — SIGNIFICANT CHANGE UP (ref 7–23)
CALCIUM SERPL-MCNC: 8.6 MG/DL — SIGNIFICANT CHANGE UP (ref 8.4–10.5)
CALCIUM SERPL-MCNC: 8.7 MG/DL — SIGNIFICANT CHANGE UP (ref 8.4–10.5)
CHLORIDE SERPL-SCNC: 106 MMOL/L — SIGNIFICANT CHANGE UP (ref 96–108)
CHLORIDE SERPL-SCNC: 106 MMOL/L — SIGNIFICANT CHANGE UP (ref 96–108)
CO2 SERPL-SCNC: 21 MMOL/L — LOW (ref 22–31)
CO2 SERPL-SCNC: 22 MMOL/L — SIGNIFICANT CHANGE UP (ref 22–31)
CREAT SERPL-MCNC: 0.7 MG/DL — SIGNIFICANT CHANGE UP (ref 0.5–1.3)
CREAT SERPL-MCNC: 0.72 MG/DL — SIGNIFICANT CHANGE UP (ref 0.5–1.3)
EOSINOPHIL # BLD AUTO: 0 K/UL — SIGNIFICANT CHANGE UP (ref 0–0.5)
EOSINOPHIL NFR BLD AUTO: 0 % — SIGNIFICANT CHANGE UP (ref 0–6)
GLUCOSE BLDC GLUCOMTR-MCNC: 131 MG/DL — HIGH (ref 70–99)
GLUCOSE BLDC GLUCOMTR-MCNC: 138 MG/DL — HIGH (ref 70–99)
GLUCOSE BLDC GLUCOMTR-MCNC: 143 MG/DL — HIGH (ref 70–99)
GLUCOSE BLDC GLUCOMTR-MCNC: 156 MG/DL — HIGH (ref 70–99)
GLUCOSE SERPL-MCNC: 132 MG/DL — HIGH (ref 70–99)
GLUCOSE SERPL-MCNC: 134 MG/DL — HIGH (ref 70–99)
HCT VFR BLD CALC: 30.2 % — LOW (ref 39–50)
HCT VFR BLD CALC: 31.1 % — LOW (ref 39–50)
HGB BLD-MCNC: 9.7 G/DL — LOW (ref 13–17)
HGB BLD-MCNC: 9.7 G/DL — LOW (ref 13–17)
IMM GRANULOCYTES NFR BLD AUTO: 0.4 % — SIGNIFICANT CHANGE UP (ref 0–1.5)
LYMPHOCYTES # BLD AUTO: 1.59 K/UL — SIGNIFICANT CHANGE UP (ref 1–3.3)
LYMPHOCYTES # BLD AUTO: 11 % — LOW (ref 13–44)
MCHC RBC-ENTMCNC: 29 PG — SIGNIFICANT CHANGE UP (ref 27–34)
MCHC RBC-ENTMCNC: 29 PG — SIGNIFICANT CHANGE UP (ref 27–34)
MCHC RBC-ENTMCNC: 31.2 GM/DL — LOW (ref 32–36)
MCHC RBC-ENTMCNC: 32.1 GM/DL — SIGNIFICANT CHANGE UP (ref 32–36)
MCV RBC AUTO: 90.4 FL — SIGNIFICANT CHANGE UP (ref 80–100)
MCV RBC AUTO: 93.1 FL — SIGNIFICANT CHANGE UP (ref 80–100)
MONOCYTES # BLD AUTO: 1.09 K/UL — HIGH (ref 0–0.9)
MONOCYTES NFR BLD AUTO: 7.5 % — SIGNIFICANT CHANGE UP (ref 2–14)
NEUTROPHILS # BLD AUTO: 11.76 K/UL — HIGH (ref 1.8–7.4)
NEUTROPHILS NFR BLD AUTO: 81 % — HIGH (ref 43–77)
NRBC # BLD: 0 /100 WBCS — SIGNIFICANT CHANGE UP (ref 0–0)
PLATELET # BLD AUTO: 173 K/UL — SIGNIFICANT CHANGE UP (ref 150–400)
PLATELET # BLD AUTO: 174 K/UL — SIGNIFICANT CHANGE UP (ref 150–400)
POTASSIUM SERPL-MCNC: 4.8 MMOL/L — SIGNIFICANT CHANGE UP (ref 3.5–5.3)
POTASSIUM SERPL-MCNC: 4.8 MMOL/L — SIGNIFICANT CHANGE UP (ref 3.5–5.3)
POTASSIUM SERPL-SCNC: 4.8 MMOL/L — SIGNIFICANT CHANGE UP (ref 3.5–5.3)
POTASSIUM SERPL-SCNC: 4.8 MMOL/L — SIGNIFICANT CHANGE UP (ref 3.5–5.3)
RBC # BLD: 3.34 M/UL — LOW (ref 4.2–5.8)
RBC # BLD: 3.34 M/UL — LOW (ref 4.2–5.8)
RBC # FLD: 14.4 % — SIGNIFICANT CHANGE UP (ref 10.3–14.5)
RBC # FLD: 14.5 % — SIGNIFICANT CHANGE UP (ref 10.3–14.5)
SODIUM SERPL-SCNC: 139 MMOL/L — SIGNIFICANT CHANGE UP (ref 135–145)
SODIUM SERPL-SCNC: 139 MMOL/L — SIGNIFICANT CHANGE UP (ref 135–145)
WBC # BLD: 14 K/UL — HIGH (ref 3.8–10.5)
WBC # BLD: 14.52 K/UL — HIGH (ref 3.8–10.5)
WBC # FLD AUTO: 14 K/UL — HIGH (ref 3.8–10.5)
WBC # FLD AUTO: 14.52 K/UL — HIGH (ref 3.8–10.5)

## 2019-10-19 PROCEDURE — 71045 X-RAY EXAM CHEST 1 VIEW: CPT | Mod: 26,76

## 2019-10-19 RX ORDER — METOPROLOL TARTRATE 50 MG
50 TABLET ORAL
Refills: 0 | Status: DISCONTINUED | OUTPATIENT
Start: 2019-10-19 | End: 2019-10-20

## 2019-10-19 RX ORDER — METOPROLOL TARTRATE 50 MG
25 TABLET ORAL ONCE
Refills: 0 | Status: COMPLETED | OUTPATIENT
Start: 2019-10-19 | End: 2019-10-19

## 2019-10-19 RX ADMIN — Medication 25 MILLIGRAM(S): at 05:46

## 2019-10-19 RX ADMIN — OXYCODONE AND ACETAMINOPHEN 2 TABLET(S): 5; 325 TABLET ORAL at 11:00

## 2019-10-19 RX ADMIN — Medication 50 MILLIGRAM(S): at 17:03

## 2019-10-19 RX ADMIN — HEPARIN SODIUM 5000 UNIT(S): 5000 INJECTION INTRAVENOUS; SUBCUTANEOUS at 05:45

## 2019-10-19 RX ADMIN — OXYCODONE AND ACETAMINOPHEN 2 TABLET(S): 5; 325 TABLET ORAL at 21:01

## 2019-10-19 RX ADMIN — Medication 10 MILLIGRAM(S): at 05:45

## 2019-10-19 RX ADMIN — HEPARIN SODIUM 5000 UNIT(S): 5000 INJECTION INTRAVENOUS; SUBCUTANEOUS at 14:19

## 2019-10-19 RX ADMIN — OXYCODONE AND ACETAMINOPHEN 2 TABLET(S): 5; 325 TABLET ORAL at 10:23

## 2019-10-19 RX ADMIN — HEPARIN SODIUM 5000 UNIT(S): 5000 INJECTION INTRAVENOUS; SUBCUTANEOUS at 21:32

## 2019-10-19 RX ADMIN — Medication 100 MILLIGRAM(S): at 05:45

## 2019-10-19 RX ADMIN — OXYCODONE AND ACETAMINOPHEN 2 TABLET(S): 5; 325 TABLET ORAL at 05:45

## 2019-10-19 RX ADMIN — ATORVASTATIN CALCIUM 40 MILLIGRAM(S): 80 TABLET, FILM COATED ORAL at 21:32

## 2019-10-19 RX ADMIN — SODIUM CHLORIDE 3 MILLILITER(S): 9 INJECTION INTRAMUSCULAR; INTRAVENOUS; SUBCUTANEOUS at 21:32

## 2019-10-19 RX ADMIN — SODIUM CHLORIDE 3 MILLILITER(S): 9 INJECTION INTRAMUSCULAR; INTRAVENOUS; SUBCUTANEOUS at 13:39

## 2019-10-19 RX ADMIN — OXYCODONE AND ACETAMINOPHEN 2 TABLET(S): 5; 325 TABLET ORAL at 20:31

## 2019-10-19 RX ADMIN — Medication 100 MILLIGRAM(S): at 14:19

## 2019-10-19 RX ADMIN — Medication 100 MILLIGRAM(S): at 21:32

## 2019-10-19 RX ADMIN — Medication 81 MILLIGRAM(S): at 12:11

## 2019-10-19 RX ADMIN — Medication 1: at 12:11

## 2019-10-19 RX ADMIN — SODIUM CHLORIDE 3 MILLILITER(S): 9 INJECTION INTRAMUSCULAR; INTRAVENOUS; SUBCUTANEOUS at 05:18

## 2019-10-19 RX ADMIN — FAMOTIDINE 20 MILLIGRAM(S): 10 INJECTION INTRAVENOUS at 12:11

## 2019-10-19 RX ADMIN — Medication 25 MILLIGRAM(S): at 10:36

## 2019-10-19 NOTE — PROGRESS NOTE ADULT - PROBLEM SELECTOR PLAN 1
c/w satain asa, bblocker  VVI 40 / Vma 13   Pain Management   Increase activity as tolerated.   Encourage Chest PT / Pulmonary toileting and Incentive spirometry every 1 hour x 10 while awake.  Continue with PUD and DVT prophylaxis.   Shower on POD #5.   D/C plan home PT once medically cleared   Plan of care discussed with attending

## 2019-10-19 NOTE — PROGRESS NOTE ADULT - SUBJECTIVE AND OBJECTIVE BOX
VITAL SIGNS    Telemetry:  SR 60-80  Vital Signs Last 24 Hrs  T(C): 36.8 (10-19-19 @ 11:52), Max: 37 (10-18-19 @ 21:33)  T(F): 98.2 (10-19-19 @ 11:52), Max: 98.6 (10-18-19 @ 21:33)  HR: 68 (10-19-19 @ 11:52) (65 - 80)  BP: 146/86 (10-19-19 @ 11:52) (108/64 - 155/84)  RR: 18 (10-19-19 @ 11:52) (18 - 34)  SpO2: 92% (10-19-19 @ 11:52) (92% - 100%)            10-18 @ 07:01  -  10-19 @ 07:00  --------------------------------------------------------  IN: 1070 mL / OUT: 560 mL / NET: 510 mL    10-19 @ 07:01  -  10-19 @ 12:13  --------------------------------------------------------  IN: 360 mL / OUT: 40 mL / NET: 320 mL       Daily     Daily Weight in k.7 (19 Oct 2019 07:03)  Admit Wt: Drug Dosing Weight  Height (cm): 177.8 (17 Oct 2019 07:21)  Weight (kg): 74.5 (17 Oct 2019 07:21)  BMI (kg/m2): 23.6 (17 Oct 2019 07:21)  BSA (m2): 1.92 (17 Oct 2019 07:21)      CAPILLARY BLOOD GLUCOSE      POCT Blood Glucose.: 156 mg/dL (19 Oct 2019 11:49)  POCT Blood Glucose.: 131 mg/dL (19 Oct 2019 07:41)  POCT Blood Glucose.: 163 mg/dL (18 Oct 2019 21:56)  POCT Blood Glucose.: 129 mg/dL (18 Oct 2019 17:31)          MEDICATIONS  aspirin enteric coated 81 milliGRAM(s) Oral daily  atorvastatin 40 milliGRAM(s) Oral at bedtime  dextrose 40% Gel 15 Gram(s) Oral once PRN  dextrose 5%. 1000 milliLiter(s) IV Continuous <Continuous>  dextrose 50% Injectable 12.5 Gram(s) IV Push once  dextrose 50% Injectable 25 Gram(s) IV Push once  dextrose 50% Injectable 25 Gram(s) IV Push once  docusate sodium 100 milliGRAM(s) Oral three times a day  famotidine    Tablet 20 milliGRAM(s) Oral daily  glucagon  Injectable 1 milliGRAM(s) IntraMuscular once PRN  heparin  Injectable 5000 Unit(s) SubCutaneous every 8 hours  insulin lispro (HumaLOG) corrective regimen sliding scale   SubCutaneous three times a day before meals  insulin lispro (HumaLOG) corrective regimen sliding scale   SubCutaneous at bedtime  metoprolol tartrate 50 milliGRAM(s) Oral two times a day  oxyCODONE    5 mG/acetaminophen 325 mG 1 Tablet(s) Oral every 4 hours PRN  oxyCODONE    5 mG/acetaminophen 325 mG 2 Tablet(s) Oral every 6 hours PRN  sodium chloride 0.9% lock flush 3 milliLiter(s) IV Push every 8 hours      >>> <<<  PHYSICAL EXAM  Subjective: NAD   Neurology: alert and oriented x 3, nonfocal, no gross deficits  CV : s1s2  Sternal Wound :  CDI , Stable +PW VVI 40  Lungs: CTA b/l left chest tube lws, no air leak  Abdomen: soft, NT,ND, ( -)BM +flatus  :  voiding  Extremities:   -c/c/e    LABS  10-19    139  |  106  |  22  ----------------------------<  132<H>  4.8   |  22  |  0.72    Ca    8.7      19 Oct 2019 06:17  Phos  2.9     10-18  Mg     2.0     10-18    TPro  5.8<L>  /  Alb  3.8  /  TBili  0.7  /  DBili  x   /  AST  51<H>  /  ALT  18  /  AlkPhos  40  10-18                                 9.7    14.52 )-----------( 173      ( 19 Oct 2019 10:33 )             31.1          PT/INR - ( 18 Oct 2019 01:28 )   PT: 14.8 sec;   INR: 1.29 ratio         PTT - ( 18 Oct 2019 01:28 )  PTT:28.1 sec       PAST MEDICAL & SURGICAL HISTORY:  HTN (hypertension)  HLD (hyperlipidemia)  H/O impaired glucose tolerance  No significant past surgical history

## 2019-10-19 NOTE — PROVIDER CONTACT NOTE (OTHER) - ACTION/TREATMENT ORDERED:
give metoprolol 25mg Po  give pain killer now.  continue to monitor.
Team 4 notified- Leti will notify senior. vital signs, medications and labs reviewed- will continue to monitor.

## 2019-10-19 NOTE — PROGRESS NOTE ADULT - ASSESSMENT
70 y/o male former smoker, with PMHx of HTN, HLD who presents to [facility] with c/o chest pain x3 days radiating to neck rated as severe, sharp intermittent at rest, associated with diaphoresis and with sob when occurring. Troponin 54 then trending down, pt was Brilinta loaded and started on Heparin infusion, now s/p cardiac cath demonstrating 3VD. TTE: EF37%, mild MR, no pericardial effusion. CT Surgery consulted for CABG evaluation.  On 10/17/19 s/p CABG x 3 LIMA (LIMA-LAD, SVG-OM, SVG-RCA) EF 35%   Postop Course: Extubated POD # 0.   Pressor support required à weaned off.   Hyperglycemia à Insulin gtt à weaned off   10/18 VVS; Transferred to 2 [facility] floor à Left pleural CT remains in place à pleural vac à LWS   10/19 Left chest removal, bblocker increased for episode of PAF 2-3 sec  Disposition: Home PT once medically cleared.

## 2019-10-20 LAB
ANION GAP SERPL CALC-SCNC: 10 MMOL/L — SIGNIFICANT CHANGE UP (ref 5–17)
BUN SERPL-MCNC: 20 MG/DL — SIGNIFICANT CHANGE UP (ref 7–23)
CALCIUM SERPL-MCNC: 8.8 MG/DL — SIGNIFICANT CHANGE UP (ref 8.4–10.5)
CHLORIDE SERPL-SCNC: 101 MMOL/L — SIGNIFICANT CHANGE UP (ref 96–108)
CO2 SERPL-SCNC: 23 MMOL/L — SIGNIFICANT CHANGE UP (ref 22–31)
CREAT SERPL-MCNC: 0.79 MG/DL — SIGNIFICANT CHANGE UP (ref 0.5–1.3)
GLUCOSE BLDC GLUCOMTR-MCNC: 107 MG/DL — HIGH (ref 70–99)
GLUCOSE BLDC GLUCOMTR-MCNC: 129 MG/DL — HIGH (ref 70–99)
GLUCOSE BLDC GLUCOMTR-MCNC: 173 MG/DL — HIGH (ref 70–99)
GLUCOSE SERPL-MCNC: 122 MG/DL — HIGH (ref 70–99)
HCT VFR BLD CALC: 31.4 % — LOW (ref 39–50)
HGB BLD-MCNC: 10.3 G/DL — LOW (ref 13–17)
MCHC RBC-ENTMCNC: 29.2 PG — SIGNIFICANT CHANGE UP (ref 27–34)
MCHC RBC-ENTMCNC: 32.8 GM/DL — SIGNIFICANT CHANGE UP (ref 32–36)
MCV RBC AUTO: 89 FL — SIGNIFICANT CHANGE UP (ref 80–100)
NRBC # BLD: 0 /100 WBCS — SIGNIFICANT CHANGE UP (ref 0–0)
PLATELET # BLD AUTO: 178 K/UL — SIGNIFICANT CHANGE UP (ref 150–400)
POTASSIUM SERPL-MCNC: 4.2 MMOL/L — SIGNIFICANT CHANGE UP (ref 3.5–5.3)
POTASSIUM SERPL-SCNC: 4.2 MMOL/L — SIGNIFICANT CHANGE UP (ref 3.5–5.3)
RBC # BLD: 3.53 M/UL — LOW (ref 4.2–5.8)
RBC # FLD: 14.3 % — SIGNIFICANT CHANGE UP (ref 10.3–14.5)
SODIUM SERPL-SCNC: 134 MMOL/L — LOW (ref 135–145)
WBC # BLD: 10.95 K/UL — HIGH (ref 3.8–10.5)
WBC # FLD AUTO: 10.95 K/UL — HIGH (ref 3.8–10.5)

## 2019-10-20 PROCEDURE — 71045 X-RAY EXAM CHEST 1 VIEW: CPT | Mod: 26

## 2019-10-20 RX ORDER — METOPROLOL TARTRATE 50 MG
75 TABLET ORAL
Refills: 0 | Status: DISCONTINUED | OUTPATIENT
Start: 2019-10-20 | End: 2019-10-21

## 2019-10-20 RX ADMIN — HEPARIN SODIUM 5000 UNIT(S): 5000 INJECTION INTRAVENOUS; SUBCUTANEOUS at 06:11

## 2019-10-20 RX ADMIN — OXYCODONE AND ACETAMINOPHEN 2 TABLET(S): 5; 325 TABLET ORAL at 12:12

## 2019-10-20 RX ADMIN — Medication 50 MILLIGRAM(S): at 06:11

## 2019-10-20 RX ADMIN — OXYCODONE AND ACETAMINOPHEN 2 TABLET(S): 5; 325 TABLET ORAL at 18:12

## 2019-10-20 RX ADMIN — Medication 100 MILLIGRAM(S): at 13:31

## 2019-10-20 RX ADMIN — FAMOTIDINE 20 MILLIGRAM(S): 10 INJECTION INTRAVENOUS at 12:04

## 2019-10-20 RX ADMIN — Medication 81 MILLIGRAM(S): at 12:03

## 2019-10-20 RX ADMIN — Medication 100 MILLIGRAM(S): at 21:00

## 2019-10-20 RX ADMIN — HEPARIN SODIUM 5000 UNIT(S): 5000 INJECTION INTRAVENOUS; SUBCUTANEOUS at 13:31

## 2019-10-20 RX ADMIN — SODIUM CHLORIDE 3 MILLILITER(S): 9 INJECTION INTRAMUSCULAR; INTRAVENOUS; SUBCUTANEOUS at 13:29

## 2019-10-20 RX ADMIN — Medication 75 MILLIGRAM(S): at 18:12

## 2019-10-20 RX ADMIN — OXYCODONE AND ACETAMINOPHEN 1 TABLET(S): 5; 325 TABLET ORAL at 06:11

## 2019-10-20 RX ADMIN — ATORVASTATIN CALCIUM 40 MILLIGRAM(S): 80 TABLET, FILM COATED ORAL at 21:00

## 2019-10-20 RX ADMIN — Medication 100 MILLIGRAM(S): at 06:11

## 2019-10-20 RX ADMIN — SODIUM CHLORIDE 3 MILLILITER(S): 9 INJECTION INTRAMUSCULAR; INTRAVENOUS; SUBCUTANEOUS at 05:56

## 2019-10-20 RX ADMIN — SODIUM CHLORIDE 3 MILLILITER(S): 9 INJECTION INTRAMUSCULAR; INTRAVENOUS; SUBCUTANEOUS at 21:17

## 2019-10-20 RX ADMIN — OXYCODONE AND ACETAMINOPHEN 1 TABLET(S): 5; 325 TABLET ORAL at 06:42

## 2019-10-20 RX ADMIN — HEPARIN SODIUM 5000 UNIT(S): 5000 INJECTION INTRAVENOUS; SUBCUTANEOUS at 21:00

## 2019-10-20 NOTE — PROGRESS NOTE ADULT - SUBJECTIVE AND OBJECTIVE BOX
VITAL SIGNS    Telemetry:  SR 80  Vital Signs Last 24 Hrs  T(C): 36.8 (10-20-19 @ 04:33), Max: 36.8 (10-19-19 @ 11:52)  T(F): 98.2 (10-20-19 @ 04:33), Max: 98.3 (10-19-19 @ 20:06)  HR: 70 (10-20-19 @ 04:33) (68 - 85)  BP: 138/82 (10-20-19 @ 04:33) (138/82 - 151/83)  RR: 18 (10-20-19 @ 04:33) (18 - 18)  SpO2: 96% (10-20-19 @ 04:33) (92% - 96%)            10-19 @ 07:01  -  10-20 @ 07:00  --------------------------------------------------------  IN: 410 mL / OUT: 490 mL / NET: -80 mL    Daily   Admit Wt: Drug Dosing Weight  Height (cm): 177.8 (17 Oct 2019 07:21)  Weight (kg): 74.5 (17 Oct 2019 07:21)  BMI (kg/m2): 23.6 (17 Oct 2019 07:21)  BSA (m2): 1.92 (17 Oct 2019 07:21)      CAPILLARY BLOOD GLUCOSE      POCT Blood Glucose.: 138 mg/dL (19 Oct 2019 21:23)  POCT Blood Glucose.: 143 mg/dL (19 Oct 2019 17:05)  POCT Blood Glucose.: 156 mg/dL (19 Oct 2019 11:49)          MEDICATIONS  aspirin enteric coated 81 milliGRAM(s) Oral daily  atorvastatin 40 milliGRAM(s) Oral at bedtime  docusate sodium 100 milliGRAM(s) Oral three times a day  famotidine    Tablet 20 milliGRAM(s) Oral daily  glucagon  Injectable 1 milliGRAM(s) IntraMuscular once PRN  heparin  Injectable 5000 Unit(s) SubCutaneous every 8 hours  insulin lispro (HumaLOG) corrective regimen sliding scale   SubCutaneous three times a day before meals  insulin lispro (HumaLOG) corrective regimen sliding scale   SubCutaneous at bedtime  metoprolol tartrate 50 milliGRAM(s) Oral two times a day  oxyCODONE    5 mG/acetaminophen 325 mG 1 Tablet(s) Oral every 4 hours PRN  oxyCODONE    5 mG/acetaminophen 325 mG 2 Tablet(s) Oral every 6 hours PRN  sodium chloride 0.9% lock flush 3 milliLiter(s) IV Push every 8 hours      >>> <<<  PHYSICAL EXAM  Subjective: NAD   Neurology: alert and oriented x 3, nonfocal, no gross deficits  CV : S1S2  Sternal Wound :  CDI , Stable  Lungs: CTA b/l  Abdomen: soft, NT,ND, ( +)BM  :  voiding  Extremities: -c/c/e      LABS  10-20    134<L>  |  101  |  20  ----------------------------<  122<H>  4.2   |  23  |  0.79    Ca    8.8      20 Oct 2019 05:09                                   10.3   10.95 )-----------( 178      ( 20 Oct 2019 05:09 )             31.4                 PAST MEDICAL & SURGICAL HISTORY:  HTN (hypertension)  HLD (hyperlipidemia)  H/O impaired glucose tolerance  No significant past surgical history

## 2019-10-20 NOTE — PROGRESS NOTE ADULT - PROBLEM SELECTOR PLAN 1
c/w satain asa, bblocker  Pain Management   Increase activity as tolerated.   Encourage Chest PT / Pulmonary toileting and Incentive spirometry every 1 hour x 10 while awake.  Continue with PUD and DVT prophylaxis.   Shower on POD #5.   D/C plan home PT once medically cleared   Plan of care discussed with attending

## 2019-10-20 NOTE — PROGRESS NOTE ADULT - ASSESSMENT
68 y/o male former smoker, with PMHx of HTN, HLD who presents to [facility] with c/o chest pain x3 days radiating to neck rated as severe, sharp intermittent at rest, associated with diaphoresis and with sob when occurring. Troponin 54 then trending down, pt was Brilinta loaded and started on Heparin infusion, now s/p cardiac cath demonstrating 3VD. TTE: EF37%, mild MR, no pericardial effusion. CT Surgery consulted for CABG evaluation.  On 10/17/19 s/p CABG x 3 LIMA (LIMA-LAD, SVG-OM, SVG-RCA) EF 35%   Postop Course: Extubated POD # 0.   Pressor support required à weaned off.   Hyperglycemia à Insulin gtt à weaned off   10/18 VVS; Transferred to 2 [facility] floor à Left pleural CT remains in place à pleural vac à LWS   10/19 Left chest removal, bblocker increased for episode of PAF 2-3 sec  10/20 pacing wires removed. Anticipate discharge tomorrw  Disposition: Home PT once medically cleared.

## 2019-10-21 ENCOUNTER — TRANSCRIPTION ENCOUNTER (OUTPATIENT)
Age: 69
End: 2019-10-21

## 2019-10-21 VITALS
SYSTOLIC BLOOD PRESSURE: 154 MMHG | RESPIRATION RATE: 17 BRPM | DIASTOLIC BLOOD PRESSURE: 96 MMHG | HEART RATE: 85 BPM | TEMPERATURE: 98 F | OXYGEN SATURATION: 99 %

## 2019-10-21 LAB
ANION GAP SERPL CALC-SCNC: 9 MMOL/L — SIGNIFICANT CHANGE UP (ref 5–17)
BUN SERPL-MCNC: 12 MG/DL — SIGNIFICANT CHANGE UP (ref 7–23)
CALCIUM SERPL-MCNC: 8.9 MG/DL — SIGNIFICANT CHANGE UP (ref 8.4–10.5)
CHLORIDE SERPL-SCNC: 102 MMOL/L — SIGNIFICANT CHANGE UP (ref 96–108)
CO2 SERPL-SCNC: 26 MMOL/L — SIGNIFICANT CHANGE UP (ref 22–31)
CREAT SERPL-MCNC: 0.67 MG/DL — SIGNIFICANT CHANGE UP (ref 0.5–1.3)
GLUCOSE BLDC GLUCOMTR-MCNC: 116 MG/DL — HIGH (ref 70–99)
GLUCOSE SERPL-MCNC: 115 MG/DL — HIGH (ref 70–99)
HCT VFR BLD CALC: 31.6 % — LOW (ref 39–50)
HGB BLD-MCNC: 10.7 G/DL — LOW (ref 13–17)
MCHC RBC-ENTMCNC: 29.7 PG — SIGNIFICANT CHANGE UP (ref 27–34)
MCHC RBC-ENTMCNC: 33.9 GM/DL — SIGNIFICANT CHANGE UP (ref 32–36)
MCV RBC AUTO: 87.8 FL — SIGNIFICANT CHANGE UP (ref 80–100)
NRBC # BLD: 0 /100 WBCS — SIGNIFICANT CHANGE UP (ref 0–0)
PLATELET # BLD AUTO: 192 K/UL — SIGNIFICANT CHANGE UP (ref 150–400)
POTASSIUM SERPL-MCNC: 4.4 MMOL/L — SIGNIFICANT CHANGE UP (ref 3.5–5.3)
POTASSIUM SERPL-SCNC: 4.4 MMOL/L — SIGNIFICANT CHANGE UP (ref 3.5–5.3)
RBC # BLD: 3.6 M/UL — LOW (ref 4.2–5.8)
RBC # FLD: 14 % — SIGNIFICANT CHANGE UP (ref 10.3–14.5)
SODIUM SERPL-SCNC: 137 MMOL/L — SIGNIFICANT CHANGE UP (ref 135–145)
WBC # BLD: 9.99 K/UL — SIGNIFICANT CHANGE UP (ref 3.8–10.5)
WBC # FLD AUTO: 9.99 K/UL — SIGNIFICANT CHANGE UP (ref 3.8–10.5)

## 2019-10-21 RX ORDER — METOPROLOL TARTRATE 50 MG
3 TABLET ORAL
Qty: 180 | Refills: 0
Start: 2019-10-21 | End: 2019-11-19

## 2019-10-21 RX ORDER — DOCUSATE SODIUM 100 MG
1 CAPSULE ORAL
Qty: 0 | Refills: 0 | DISCHARGE
Start: 2019-10-21

## 2019-10-21 RX ORDER — OXYCODONE AND ACETAMINOPHEN 5; 325 MG/1; MG/1
1 TABLET ORAL
Qty: 30 | Refills: 0
Start: 2019-10-21 | End: 2019-10-25

## 2019-10-21 RX ORDER — ATORVASTATIN CALCIUM 80 MG/1
1 TABLET, FILM COATED ORAL
Qty: 31 | Refills: 0
Start: 2019-10-21 | End: 2019-11-20

## 2019-10-21 RX ADMIN — HEPARIN SODIUM 5000 UNIT(S): 5000 INJECTION INTRAVENOUS; SUBCUTANEOUS at 05:56

## 2019-10-21 RX ADMIN — Medication 100 MILLIGRAM(S): at 05:56

## 2019-10-21 RX ADMIN — Medication 81 MILLIGRAM(S): at 11:20

## 2019-10-21 RX ADMIN — FAMOTIDINE 20 MILLIGRAM(S): 10 INJECTION INTRAVENOUS at 11:21

## 2019-10-21 RX ADMIN — Medication 75 MILLIGRAM(S): at 05:56

## 2019-10-21 RX ADMIN — OXYCODONE AND ACETAMINOPHEN 2 TABLET(S): 5; 325 TABLET ORAL at 05:56

## 2019-10-21 RX ADMIN — SODIUM CHLORIDE 3 MILLILITER(S): 9 INJECTION INTRAMUSCULAR; INTRAVENOUS; SUBCUTANEOUS at 05:18

## 2019-10-21 NOTE — PROGRESS NOTE ADULT - ASSESSMENT
68 y/o male former smoker, with PMHx of HTN, HLD who presents to [facility] with c/o chest pain x3 days radiating to neck rated as severe, sharp intermittent at rest, associated with diaphoresis and with sob when occurring. Troponin 54 then trending down, pt was Brilinta loaded and started on Heparin infusion, now s/p cardiac cath demonstrating 3VD. TTE: EF37%, mild MR, no pericardial effusion. CT Surgery consulted for CABG evaluation.  On 10/17/19 s/p CABG x 3 LIMA (LIMA-LAD, SVG-OM, SVG-RCA) EF 35%   Postop Course: Extubated POD # 0.   Pressor support required à weaned off.   Hyperglycemia à Insulin gtt à weaned off   10/18 VVS; Transferred to 2 [facility] floor à Left pleural CT remains in place à pleural vac à LWS   10/19 Left chest removal, bblocker increased for episode of PAF 2-3 sec  10/20 pacing wires removed. Anticipate discharge tomorrw  Disposition: Home PT once medically cleared. 68 y/o male former smoker, with PMHx of HTN, HLD who presents to [facility] with c/o chest pain x3 days radiating to neck rated as severe, sharp intermittent at rest, associated with diaphoresis and with sob when occurring. Troponin 54 then trending down, pt was Brilinta loaded and started on Heparin infusion, now s/p cardiac cath demonstrating 3VD. TTE: EF37%, mild MR, no pericardial effusion. CT Surgery consulted for CABG evaluation.  On 10/17/19 s/p CABG x 3 LIMA (LIMA-LAD, SVG-OM, SVG-RCA) EF 35%   Postop Course: Extubated POD # 0.   Pressor support required à weaned off.   Hyperglycemia à Insulin gtt à weaned off   10/18 VVS; Transferred to 2 [facility] floor à Left pleural CT remains in place à pleural vac à LWS   10/19 Left chest removal, bblocker increased for episode of PAF 2-3 sec  10/20 pacing wires removed. Anticipate discharge tomorrw  10/21  VSS   dc home

## 2019-10-21 NOTE — PROGRESS NOTE ADULT - NSHPATTENDINGPLANDISCUSS_GEN_ALL_CORE
Plan discussed with cardiology fellow; patient seen and examined.       I was physically present for the key portions of the evaluation and management (E/M) service provided.    I agree with the above history, physical, and plan which I have reviewed and edited where appropriate.
cts rounds
MS4 Dr. Leti Malloy
MS4 Mellissa, Dr. Landeros, Dr. Sidhu
MS4 Dr. Leti Malloy

## 2019-10-21 NOTE — PROGRESS NOTE ADULT - PROBLEM SELECTOR PROBLEM 1
Unstable angina
S/P CABG x 3
Unstable angina
Unstable angina
Triple vessel coronary artery disease
Unstable angina

## 2019-10-21 NOTE — DISCHARGE NOTE NURSING/CASE MANAGEMENT/SOCIAL WORK - PATIENT PORTAL LINK FT
You can access the FollowMyHealth Patient Portal offered by [facility] by registering at the following website: [email or web address]. By joining [employer]’s FollowMyHealth portal, you will also be able to view your health information using other applications (apps) compatible with our system.

## 2019-10-21 NOTE — PROGRESS NOTE ADULT - SUBJECTIVE AND OBJECTIVE BOX
VITAL SIGNS    Telemetry:    sr-st 100    Vital Signs Last 24 Hrs  T(C): 37 (10-21-19 @ 05:16), Max: 37 (10-21-19 @ 05:16)  T(F): 98.6 (10-21-19 @ 05:16), Max: 98.6 (10-21-19 @ 05:16)  HR: 90 (10-21-19 @ 05:16) (74 - 90)  BP: 150/88 (10-21-19 @ 05:16) (122/73 - 153/80)  RR: 18 (10-21-19 @ 05:16) (18 - 18)  SpO2: 95% (10-21-19 @ 05:16) (92% - 95%)                   Daily     Daily Weight in k.2 (20 Oct 2019 10:57)        CAPILLARY BLOOD GLUCOSE      POCT Blood Glucose.: 173 mg/dL (20 Oct 2019 21:16)  POCT Blood Glucose.: 129 mg/dL (20 Oct 2019 17:07)  POCT Blood Glucose.: 107 mg/dL (20 Oct 2019 07:40)          Pacing Wires        [  ]   Settings:                                  Isolated  [  ]                      PHYSICAL EXAM    Neurology: alert and oriented x 3, moves all extremities with no defecits  CV :  RRR  Sternal Wound :  CDI , Stable  Lungs:   CTA B/L  Abdomen: soft, nontender, nondistended, positive bowel sounds, last bowel movement   Extremities: VITAL SIGNS    Telemetry:    sr-st 100    Vital Signs Last 24 Hrs  T(C): 37 (10-21-19 @ 05:16), Max: 37 (10-21-19 @ 05:16)  T(F): 98.6 (10-21-19 @ 05:16), Max: 98.6 (10-21-19 @ 05:16)  HR: 90 (10-21-19 @ 05:16) (74 - 90)  BP: 150/88 (10-21-19 @ 05:16) (122/73 - 153/80)  RR: 18 (10-21-19 @ 05:16) (18 - 18)  SpO2: 95% (10-21-19 @ 05:16) (92% - 95%)                   Daily     Daily Weight in k.2 (20 Oct 2019 10:57)        CAPILLARY BLOOD GLUCOSE      POCT Blood Glucose.: 173 mg/dL (20 Oct 2019 21:16)  POCT Blood Glucose.: 129 mg/dL (20 Oct 2019 17:07)  POCT Blood Glucose.: 107 mg/dL (20 Oct 2019 07:40)                                PHYSICAL EXAM    Neurology: alert and oriented x 3, moves all extremities with no defecits  CV :  RRR  Sternal Wound :  CDI , Stable  Lungs:   CTA B/L  Abdomen: soft, nontender, nondistended, positive bowel sounds, last bowel movement 10/21  Extremities:       trace  le edema   no calf tenderness

## 2019-10-21 NOTE — PROGRESS NOTE ADULT - PROVIDER SPECIALTY LIST ADULT
CT Surgery
CT Surgery
CTU
Cardiology
Internal Medicine
Internal Medicine
CT Surgery
Internal Medicine
CT Surgery
CT Surgery
Cardiology
Critical Care
Internal Medicine
Internal Medicine

## 2019-10-21 NOTE — PROGRESS NOTE ADULT - REASON FOR ADMISSION
Preop CABG
69M with chest pain
chest pain
69M with chest pain
chest pain
sp  CABG
chest pain
69M with chest pain

## 2019-10-21 NOTE — PROGRESS NOTE ADULT - PROBLEM SELECTOR PLAN 1
c/w satain asa, bblocker  Pain Management   Lop 75q12  home mon/tues  Encourage Chest PT / Pulmonary toileting and Incentive spirometry every 1 hour x 10 while awake.  Continue with PUD and DVT prophylaxis.   Shower on POD #5.   D/C plan home PT once medically cleared   Plan of care discussed with attending c/w satain asa, bblocker  Pain Management   Lop 75q12  home mon  Encourage Chest PT / Pulmonary toileting and Incentive spirometry every 1 hour x 10 while awake.  Continue with PUD and DVT prophylaxis.   Shower on POD #5.   D/C plan home PT once medically cleared   Plan of care discussed with attending

## 2019-10-22 DIAGNOSIS — Z76.89 PERSONS ENCOUNTERING HEALTH SERVICES IN OTHER SPECIFIED CIRCUMSTANCES: ICD-10-CM

## 2019-10-22 RX ORDER — ASPIRIN/CALCIUM CARB/MAGNESIUM 324 MG
1 TABLET ORAL
Qty: 30 | Refills: 0
Start: 2019-10-22 | End: 2019-11-20

## 2019-10-28 ENCOUNTER — APPOINTMENT (OUTPATIENT)
Dept: CARDIOTHORACIC SURGERY | Facility: CLINIC | Age: 69
End: 2019-10-28
Payer: COMMERCIAL

## 2019-10-28 ENCOUNTER — INPATIENT (INPATIENT)
Facility: HOSPITAL | Age: 69
LOS: 8 days | Discharge: HOME | DRG: 904 | End: 2019-11-06
Attending: THORACIC SURGERY (CARDIOTHORACIC VASCULAR SURGERY) | Admitting: THORACIC SURGERY (CARDIOTHORACIC VASCULAR SURGERY)
Payer: MEDICARE

## 2019-10-28 VITALS
RESPIRATION RATE: 18 BRPM | HEIGHT: 70 IN | SYSTOLIC BLOOD PRESSURE: 120 MMHG | DIASTOLIC BLOOD PRESSURE: 75 MMHG | BODY MASS INDEX: 23.34 KG/M2 | OXYGEN SATURATION: 95 % | WEIGHT: 163 LBS | TEMPERATURE: 98.7 F | HEART RATE: 96 BPM

## 2019-10-28 VITALS
SYSTOLIC BLOOD PRESSURE: 130 MMHG | OXYGEN SATURATION: 96 % | TEMPERATURE: 99 F | HEART RATE: 90 BPM | DIASTOLIC BLOOD PRESSURE: 82 MMHG | RESPIRATION RATE: 16 BRPM

## 2019-10-28 DIAGNOSIS — I25.10 ATHEROSCLEROTIC HEART DISEASE OF NATIVE CORONARY ARTERY WITHOUT ANGINA PECTORIS: Chronic | ICD-10-CM

## 2019-10-28 DIAGNOSIS — T81.40XA INFECTION FOLLOWING A PROCEDURE, UNSPECIFIED, INITIAL ENCOUNTER: ICD-10-CM

## 2019-10-28 DIAGNOSIS — T14.8XXA OTHER INJURY OF UNSPECIFIED BODY REGION, INITIAL ENCOUNTER: ICD-10-CM

## 2019-10-28 DIAGNOSIS — I25.10 ATHEROSCLEROTIC HEART DISEASE OF NATIVE CORONARY ARTERY W/OUT ANGINA PECTORIS: ICD-10-CM

## 2019-10-28 LAB
ANION GAP SERPL CALC-SCNC: 13 MMOL/L — SIGNIFICANT CHANGE UP (ref 5–17)
APTT BLD: 25.4 SEC — LOW (ref 27.5–36.3)
BLD GP AB SCN SERPL QL: NEGATIVE — SIGNIFICANT CHANGE UP
BUN SERPL-MCNC: 23 MG/DL — SIGNIFICANT CHANGE UP (ref 7–23)
CALCIUM SERPL-MCNC: 9.1 MG/DL — SIGNIFICANT CHANGE UP (ref 8.4–10.5)
CHLORIDE SERPL-SCNC: 96 MMOL/L — SIGNIFICANT CHANGE UP (ref 96–108)
CO2 SERPL-SCNC: 24 MMOL/L — SIGNIFICANT CHANGE UP (ref 22–31)
CREAT SERPL-MCNC: 0.97 MG/DL — SIGNIFICANT CHANGE UP (ref 0.5–1.3)
GLUCOSE SERPL-MCNC: 141 MG/DL — HIGH (ref 70–99)
HCT VFR BLD CALC: 32.6 % — LOW (ref 39–50)
HGB BLD-MCNC: 10.7 G/DL — LOW (ref 13–17)
INR BLD: 1.38 RATIO — HIGH (ref 0.88–1.16)
MCHC RBC-ENTMCNC: 29 PG — SIGNIFICANT CHANGE UP (ref 27–34)
MCHC RBC-ENTMCNC: 32.8 GM/DL — SIGNIFICANT CHANGE UP (ref 32–36)
MCV RBC AUTO: 88.3 FL — SIGNIFICANT CHANGE UP (ref 80–100)
NRBC # BLD: 0 /100 WBCS — SIGNIFICANT CHANGE UP (ref 0–0)
PLATELET # BLD AUTO: 147 K/UL — LOW (ref 150–400)
POTASSIUM SERPL-MCNC: 5 MMOL/L — SIGNIFICANT CHANGE UP (ref 3.5–5.3)
POTASSIUM SERPL-SCNC: 5 MMOL/L — SIGNIFICANT CHANGE UP (ref 3.5–5.3)
PROTHROM AB SERPL-ACNC: 16 SEC — HIGH (ref 10–12.9)
RBC # BLD: 3.69 M/UL — LOW (ref 4.2–5.8)
RBC # FLD: 15 % — HIGH (ref 10.3–14.5)
RH IG SCN BLD-IMP: POSITIVE — SIGNIFICANT CHANGE UP
SODIUM SERPL-SCNC: 133 MMOL/L — LOW (ref 135–145)
WBC # BLD: 10.82 K/UL — HIGH (ref 3.8–10.5)
WBC # FLD AUTO: 10.82 K/UL — HIGH (ref 3.8–10.5)

## 2019-10-28 PROCEDURE — 99024 POSTOP FOLLOW-UP VISIT: CPT

## 2019-10-28 PROCEDURE — 71045 X-RAY EXAM CHEST 1 VIEW: CPT | Mod: 26,76

## 2019-10-28 PROCEDURE — 99223 1ST HOSP IP/OBS HIGH 75: CPT

## 2019-10-28 PROCEDURE — 71250 CT THORAX DX C-: CPT | Mod: 26

## 2019-10-28 RX ORDER — VANCOMYCIN HCL 1 G
1000 VIAL (EA) INTRAVENOUS ONCE
Refills: 0 | Status: COMPLETED | OUTPATIENT
Start: 2019-10-28 | End: 2019-10-28

## 2019-10-28 RX ORDER — OXYCODONE AND ACETAMINOPHEN 5; 325 MG/1; MG/1
1 TABLET ORAL EVERY 6 HOURS
Refills: 0 | Status: DISCONTINUED | OUTPATIENT
Start: 2019-10-28 | End: 2019-10-29

## 2019-10-28 RX ORDER — VANCOMYCIN HCL 1 G
1000 VIAL (EA) INTRAVENOUS EVERY 12 HOURS
Refills: 0 | Status: DISCONTINUED | OUTPATIENT
Start: 2019-10-29 | End: 2019-10-30

## 2019-10-28 RX ORDER — MEROPENEM 1 G/30ML
1000 INJECTION INTRAVENOUS EVERY 8 HOURS
Refills: 0 | Status: DISCONTINUED | OUTPATIENT
Start: 2019-10-28 | End: 2019-10-30

## 2019-10-28 RX ORDER — BENZOCAINE AND MENTHOL 5; 1 G/100ML; G/100ML
1 LIQUID ORAL THREE TIMES A DAY
Refills: 0 | Status: DISCONTINUED | OUTPATIENT
Start: 2019-10-28 | End: 2019-10-30

## 2019-10-28 RX ORDER — SENNA PLUS 8.6 MG/1
2 TABLET ORAL AT BEDTIME
Refills: 0 | Status: DISCONTINUED | OUTPATIENT
Start: 2019-10-28 | End: 2019-10-30

## 2019-10-28 RX ORDER — ACETAMINOPHEN 500 MG
650 TABLET ORAL EVERY 6 HOURS
Refills: 0 | Status: DISCONTINUED | OUTPATIENT
Start: 2019-10-28 | End: 2019-10-30

## 2019-10-28 RX ORDER — SODIUM CHLORIDE 9 MG/ML
3 INJECTION INTRAMUSCULAR; INTRAVENOUS; SUBCUTANEOUS EVERY 8 HOURS
Refills: 0 | Status: DISCONTINUED | OUTPATIENT
Start: 2019-10-28 | End: 2019-10-30

## 2019-10-28 RX ORDER — INSULIN LISPRO 100/ML
VIAL (ML) SUBCUTANEOUS AT BEDTIME
Refills: 0 | Status: DISCONTINUED | OUTPATIENT
Start: 2019-10-28 | End: 2019-10-30

## 2019-10-28 RX ORDER — CHLORHEXIDINE GLUCONATE 213 G/1000ML
1 SOLUTION TOPICAL
Refills: 0 | Status: DISCONTINUED | OUTPATIENT
Start: 2019-10-28 | End: 2019-10-30

## 2019-10-28 RX ORDER — INSULIN LISPRO 100/ML
VIAL (ML) SUBCUTANEOUS
Refills: 0 | Status: DISCONTINUED | OUTPATIENT
Start: 2019-10-28 | End: 2019-10-30

## 2019-10-28 RX ORDER — SODIUM CHLORIDE 9 MG/ML
10 INJECTION INTRAMUSCULAR; INTRAVENOUS; SUBCUTANEOUS
Refills: 0 | Status: DISCONTINUED | OUTPATIENT
Start: 2019-10-28 | End: 2019-10-30

## 2019-10-28 RX ORDER — MEROPENEM 1 G/30ML
INJECTION INTRAVENOUS
Refills: 0 | Status: DISCONTINUED | OUTPATIENT
Start: 2019-10-28 | End: 2019-10-30

## 2019-10-28 RX ORDER — METFORMIN HYDROCHLORIDE 500 MG/1
500 TABLET, COATED ORAL
Qty: 60 | Refills: 2 | Status: DISCONTINUED | COMMUNITY
Start: 2019-02-25 | End: 2019-10-28

## 2019-10-28 RX ORDER — LISINOPRIL 10 MG/1
10 TABLET ORAL DAILY
Qty: 30 | Refills: 1 | Status: COMPLETED | COMMUNITY
Start: 2019-02-20 | End: 2019-10-28

## 2019-10-28 RX ORDER — VANCOMYCIN HCL 1 G
VIAL (EA) INTRAVENOUS
Refills: 0 | Status: DISCONTINUED | OUTPATIENT
Start: 2019-10-28 | End: 2019-10-30

## 2019-10-28 RX ORDER — ASPIRIN/CALCIUM CARB/MAGNESIUM 324 MG
81 TABLET ORAL DAILY
Refills: 0 | Status: DISCONTINUED | OUTPATIENT
Start: 2019-10-29 | End: 2019-10-30

## 2019-10-28 RX ORDER — INFLUENZA VIRUS VACCINE 15; 15; 15; 15 UG/.5ML; UG/.5ML; UG/.5ML; UG/.5ML
0.5 SUSPENSION INTRAMUSCULAR ONCE
Refills: 0 | Status: DISCONTINUED | OUTPATIENT
Start: 2019-10-28 | End: 2019-10-31

## 2019-10-28 RX ORDER — ATORVASTATIN CALCIUM 80 MG/1
40 TABLET, FILM COATED ORAL AT BEDTIME
Refills: 0 | Status: DISCONTINUED | OUTPATIENT
Start: 2019-10-28 | End: 2019-10-30

## 2019-10-28 RX ORDER — METOPROLOL TARTRATE 50 MG
25 TABLET ORAL
Refills: 0 | Status: DISCONTINUED | OUTPATIENT
Start: 2019-10-28 | End: 2019-10-29

## 2019-10-28 RX ORDER — DOCUSATE SODIUM 100 MG/1
100 CAPSULE, LIQUID FILLED ORAL 3 TIMES DAILY
Refills: 0 | Status: DISCONTINUED | COMMUNITY
Start: 2019-10-28 | End: 2019-10-28

## 2019-10-28 RX ORDER — OXYCODONE AND ACETAMINOPHEN 5; 325 MG/1; MG/1
5-325 TABLET ORAL
Refills: 0 | Status: ACTIVE | COMMUNITY
Start: 2019-10-21

## 2019-10-28 RX ORDER — MEROPENEM 1 G/30ML
1000 INJECTION INTRAVENOUS ONCE
Refills: 0 | Status: COMPLETED | OUTPATIENT
Start: 2019-10-28 | End: 2019-10-28

## 2019-10-28 RX ORDER — HEPARIN SODIUM 5000 [USP'U]/ML
5000 INJECTION INTRAVENOUS; SUBCUTANEOUS EVERY 8 HOURS
Refills: 0 | Status: DISCONTINUED | OUTPATIENT
Start: 2019-10-28 | End: 2019-10-28

## 2019-10-28 RX ADMIN — OXYCODONE AND ACETAMINOPHEN 1 TABLET(S): 5; 325 TABLET ORAL at 18:49

## 2019-10-28 RX ADMIN — MEROPENEM 100 MILLIGRAM(S): 1 INJECTION INTRAVENOUS at 21:37

## 2019-10-28 RX ADMIN — MEROPENEM 100 MILLIGRAM(S): 1 INJECTION INTRAVENOUS at 20:31

## 2019-10-28 RX ADMIN — ATORVASTATIN CALCIUM 40 MILLIGRAM(S): 80 TABLET, FILM COATED ORAL at 21:37

## 2019-10-28 RX ADMIN — Medication 250 MILLIGRAM(S): at 19:24

## 2019-10-28 RX ADMIN — SENNA PLUS 2 TABLET(S): 8.6 TABLET ORAL at 22:02

## 2019-10-28 RX ADMIN — BENZOCAINE AND MENTHOL 1 LOZENGE: 5; 1 LIQUID ORAL at 21:37

## 2019-10-28 RX ADMIN — SODIUM CHLORIDE 3 MILLILITER(S): 9 INJECTION INTRAMUSCULAR; INTRAVENOUS; SUBCUTANEOUS at 21:23

## 2019-10-28 RX ADMIN — Medication 25 MILLIGRAM(S): at 18:49

## 2019-10-28 NOTE — CONSULT NOTE ADULT - SUBJECTIVE AND OBJECTIVE BOX
Patient is a 69y old  Male who presents with a chief complaint of Sternal wound drainage (28 Oct 2019 15:42)      HPI:  69 yr old male direct admit from CTS office with complaints of MT  cloudy distal drainage  from sternal wound x 5 days with associated chills and no fevers.  He is sp CABG x 3 10/17 19 with uncomplicated post op course.  His history includes HTN, NIDDM and remote cigarette smoking.  Mr Oropeza is admitted for wound culture,  blood culture and ID consult. 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[employer]  Lives with:  family    Substance Use : denies  Tobacco Usage:  (   ) never smoked   (  x ) former smoker   (   ) current smoker  (     ) pack year  (        ) last tobacco use date  Alcohol Usage: denies  Travel:  born in [facility], living here since   Pets: denies          FAMILY HISTORY:  parents   mother- htn  father - renal disease      REVIEW OF SYSTEMS  General:	chills  no fever at home    Skin:No rash  	  Ophthalmologic:Denies any visual complaints,discharge redness or photophobia  	  ENMT:No nasal discharge,headache,sinus congestion or throat pain.No dental complaints    Respiratory and Thorax:  cough  	  Cardiovascular:	No chest pain,palpitaions or dizziness    Gastrointestinal:	NO nausea,abdominal pain or diarrhea.    Genitourinary:	No dysuria,frequency. No flank pain    Musculoskeletal:	 left shoulder pain    Neurological:No confusion,diziness.No extremity weakness.No bladder or bowel incontinence	    Psychiatric:No delusions or hallucinations	    Hematology/Lymphatics:	No LN swelling.No gum bleeding     Endocrine:	No recent weight gain or loss.No abnormal heat/cold intolerance    Allergic/Immunologic:	No hives or rash     Allergies    No Known Allergies    Intolerances        Antimicrobials:       MEDICATIONS  (prior antimicrobials ):             meropenem  IVPB      meropenem  IVPB 1000 milliGRAM(s) IV Intermittent once  meropenem  IVPB 1000 milliGRAM(s) IV Intermittent every 8 hours  vancomycin  IVPB 1000 milliGRAM(s) IV Intermittent once  vancomycin  IVPB          MEDICATIONS  (STANDING):  atorvastatin 40 milliGRAM(s) Oral at bedtime  benzocaine 15 mG/menthol 3.6 mG Lozenge 1 Lozenge Oral three times a day  heparin  Injectable 5000 Unit(s) SubCutaneous every 8 hours  insulin lispro (HumaLOG) corrective regimen sliding scale   SubCutaneous three times a day before meals  insulin lispro (HumaLOG) corrective regimen sliding scale   SubCutaneous at bedtime  meropenem  IVPB      meropenem  IVPB 1000 milliGRAM(s) IV Intermittent once  meropenem  IVPB 1000 milliGRAM(s) IV Intermittent every 8 hours  metoprolol tartrate 25 milliGRAM(s) Oral two times a day  sodium chloride 0.9% lock flush 3 milliLiter(s) IV Push every 8 hours  vancomycin  IVPB 1000 milliGRAM(s) IV Intermittent once  vancomycin  IVPB        MEDICATIONS  (PRN):  acetaminophen   Tablet .. 650 milliGRAM(s) Oral every 6 hours PRN Mild Pain (1 - 3)        Vital Signs Last 24 Hrs  T(C): 37.2 (28 Oct 2019 16:25), Max: 37.2 (28 Oct 2019 16:25)  T(F): 99 (28 Oct 2019 16:25), Max: 99 (28 Oct 2019 16:25)  HR: 90 (28 Oct 2019 16:25) (90 - 90)  BP: 130/82 (28 Oct 2019 16:25) (130/82 - 130/82)  BP(mean): --  RR: 16 (28 Oct 2019 16:25) (16 - 16)  SpO2: 96% (28 Oct 2019 16:25) (96% - 96%)    PHYSICAL EXAM:Pleasant patient in no acute distress.      Constitutional:Comfortable.Awake and alert  No cachexia     Eyes:PERRL EOMI.NO discharge or conjunctival injection    ENMT:No sinus tenderness.No thrush.No pharyngeal exudate or erythema.Fair dental hygiene    Neck:Supple,No LN,no JVD      Respiratory:Good air entry bilaterally,CTA    Cardiovascular:S1 S2 wnl,     sternal wound with opening mid room and lower pole with marie colored purulent discharge    Gastrointestinal:Soft BS(+) no tenderness no masses ,No rebound or guarding    Genitourinary:No CVA tendereness     Extremities:No cyanosis,clubbing or edema.  SVG incisions- C/D/I    Neurological:AAO X 3,No grossly focal deficits    Skin:No rash     Lymph Nodes:No palpable LNs    Musculoskeletal:No joint swelling or LOM    Psychiatric:Affect normal.      Complete Blood Count (10.21.19 @ 05:25)    Nucleated RBC: 0 /100 WBCs    WBC Count: 9.99 K/uL    RBC Count: 3.60 M/uL    Hemoglobin: 10.7 g/dL    Hematocrit: 31.6 %    Mean Cell Volume: 87.8 fl    Mean Cell Hemoglobin: 29.7 pg    Mean Cell Hemoglobin Conc: 33.9 gm/dL    Red Cell Distrib Width: 14.0 %    Platelet Count - Automated: 192 K/uL    Basic Metabolic Panel - STAT (10.21.19 @ 05:25)    Sodium, Serum: 137 mmol/L    Potassium, Serum: 4.4 mmol/L    Chloride, Serum: 102 mmol/L    Carbon Dioxide, Serum: 26 mmol/L    Anion Gap, Serum: 9 mmol/L    Blood Urea Nitrogen, Serum: 12 mg/dL    Creatinine, Serum: 0.67 mg/dL    Glucose, Serum: 115 mg/dL    Calcium, Total Serum: 8.9 mg/dL    eGFR if Non : 99: Interpretative comment  The units for eGFR are mL/min/1.73M2 (normalized body surface area). The  eGFR is calculated from a serum creatinine using the CKD-EPI equation.  Other variables required for calculation are race, age and sex. Among  patients with chronic kidney disease (CKD), the eGFR is useful in  determining the stage of disease according to KDOQI CKD classification.  All eGFR results are reported numerically with the following  interpretation.          GFR                    With                 Without     (ml/min/1.73 m2)    Kidney Damage       Kidney Damage        >= 90                    Stage 1                     Normal        60-89                    Stage 2                     Decreased GFR        30-59     Stage 3                     Stage 3        15-29                    Stage 4                     Stage 4        < 15                      Stage 5                     Stage 5  Each stage of CKD assumes that the associated GFR level has been in  effect for at least 3 months. Determination of stages one and two (with  eGFR > 59 ml/min/m2) requires estimation of kidney damage for at least 3  months as defined by structural or functional abnormalities.  Limitations: All estimates of GFR will be less accurate for patients at  extremes of muscle mass (including but not limited to frail elderly,  critically ill, or cancer patients), those with unusual diets, and those  with conditions associated with reduced secretion or extrarenal  elimination of creatinine. The eGFR equation is not recommended for use  in patients with unstable creatinine levels. mL/min/1.73M2    eGFR if African American: 115 mL/min/1.73M2            RECENT CULTURES:      MICROBIOLOGY:          Radiology:    < from: Xray Chest 1 View- PORTABLE-Routine (10.20.19 @ 05:48) >  EXAM:  XR CHEST PORTABLE ROUTINE 1V                            PROCEDURE DATE:  10/20/2019            INTERPRETATION:  Indication: Post cardiac surgery.    Technique: Single portable view of the chest.    Comparison: 10/19/2019    Findings: The cardiac silhouette is enlarged. The patient is status post   median sternotomy. There are small bilateral pleural effusions, new.   There is new mild pulmonary edema.    Impression: New small bilateral pleural effusions and mild pulmonary   edema.                    NOLVIA HURLEY M.D., ATTENDING RADIOLOGIST  This document has been electronically signed. Oct 20 2019  9:45AM              < end of copied text > Patient is a 69y old  Male who presents with a chief complaint of Sternal wound drainage (28 Oct 2019 15:42)      HPI:  69 yr old male direct admit from CTS office with complaints of MT  cloudy distal drainage  from sternal wound x 5 days with associated chills and no fevers.  He is sp CABG x 3 10/17 19 with uncomplicated post op course.  His history includes HTN, NIDDM and remote cigarette smoking.  Mr Oropeza is admitted for wound culture,  blood culture and ID consult. 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[employer]  Lives with:  family    Substance Use : denies  Tobacco Usage:  (   ) never smoked   (  x ) former smoker   (   ) current smoker  (     ) pack year  (        ) last tobacco use date  Alcohol Usage: denies  Travel:  born in [facility], living here since   Pets: denies          FAMILY HISTORY:  parents   mother- htn  father - renal disease      REVIEW OF SYSTEMS  General:	chills  no fever at home    Skin:No rash  	  Ophthalmologic:Denies any visual complaints,discharge redness or photophobia  	  ENMT:No nasal discharge,headache,sinus congestion or throat pain.No dental complaints    Respiratory and Thorax:  cough  	  Cardiovascular:	No chest pain,palpitaions or dizziness    Gastrointestinal:	NO nausea,abdominal pain or diarrhea.    Genitourinary:	No dysuria,frequency. No flank pain    Musculoskeletal:	 left shoulder pain    Neurological:No confusion,diziness.No extremity weakness.No bladder or bowel incontinence	    Psychiatric:No delusions or hallucinations	    Hematology/Lymphatics:	No LN swelling.No gum bleeding     Endocrine:	No recent weight gain or loss.No abnormal heat/cold intolerance    Allergic/Immunologic:	No hives or rash     Allergies    No Known Allergies    Intolerances        Antimicrobials:       MEDICATIONS  (prior antimicrobials ):             meropenem  IVPB      meropenem  IVPB 1000 milliGRAM(s) IV Intermittent once  meropenem  IVPB 1000 milliGRAM(s) IV Intermittent every 8 hours  vancomycin  IVPB 1000 milliGRAM(s) IV Intermittent once  vancomycin  IVPB          MEDICATIONS  (STANDING):  atorvastatin 40 milliGRAM(s) Oral at bedtime  benzocaine 15 mG/menthol 3.6 mG Lozenge 1 Lozenge Oral three times a day  heparin  Injectable 5000 Unit(s) SubCutaneous every 8 hours  insulin lispro (HumaLOG) corrective regimen sliding scale   SubCutaneous three times a day before meals  insulin lispro (HumaLOG) corrective regimen sliding scale   SubCutaneous at bedtime  meropenem  IVPB      meropenem  IVPB 1000 milliGRAM(s) IV Intermittent once  meropenem  IVPB 1000 milliGRAM(s) IV Intermittent every 8 hours  metoprolol tartrate 25 milliGRAM(s) Oral two times a day  sodium chloride 0.9% lock flush 3 milliLiter(s) IV Push every 8 hours  vancomycin  IVPB 1000 milliGRAM(s) IV Intermittent once  vancomycin  IVPB        MEDICATIONS  (PRN):  acetaminophen   Tablet .. 650 milliGRAM(s) Oral every 6 hours PRN Mild Pain (1 - 3)        Vital Signs Last 24 Hrs  T(C): 37.2 (28 Oct 2019 16:25), Max: 37.2 (28 Oct 2019 16:25)  T(F): 99 (28 Oct 2019 16:25), Max: 99 (28 Oct 2019 16:25)  HR: 90 (28 Oct 2019 16:25) (90 - 90)  BP: 130/82 (28 Oct 2019 16:25) (130/82 - 130/82)  BP(mean): --  RR: 16 (28 Oct 2019 16:25) (16 - 16)  SpO2: 96% (28 Oct 2019 16:25) (96% - 96%)    PHYSICAL EXAM:Pleasant patient in no acute distress.      Constitutional:Comfortable.Awake and alert  No cachexia     Eyes:PERRL EOMI.NO discharge or conjunctival injection    ENMT:No sinus tenderness.No thrush.No pharyngeal exudate or erythema.Fair dental hygiene    Neck:Supple,No LN,no JVD      Respiratory:Good air entry bilaterally,CTA    Cardiovascular:S1 S2 wnl,     sternal wound with opening mid room and lower pole with marie colored purulent discharge sternum tender    Gastrointestinal:Soft BS(+) no tenderness no masses ,No rebound or guarding    Genitourinary:No CVA tendereness     Extremities:No cyanosis,clubbing or edema.  SVG incisions- C/D/I    Neurological:AAO X 3,No grossly focal deficits    Skin:No rash     Lymph Nodes:No palpable LNs    Musculoskeletal:No joint swelling or LOM    Psychiatric:Affect normal.      Complete Blood Count (10.21.19 @ 05:25)    Nucleated RBC: 0 /100 WBCs    WBC Count: 9.99 K/uL    RBC Count: 3.60 M/uL    Hemoglobin: 10.7 g/dL    Hematocrit: 31.6 %    Mean Cell Volume: 87.8 fl    Mean Cell Hemoglobin: 29.7 pg    Mean Cell Hemoglobin Conc: 33.9 gm/dL    Red Cell Distrib Width: 14.0 %    Platelet Count - Automated: 192 K/uL    Basic Metabolic Panel - STAT (10.21.19 @ 05:25)    Sodium, Serum: 137 mmol/L    Potassium, Serum: 4.4 mmol/L    Chloride, Serum: 102 mmol/L    Carbon Dioxide, Serum: 26 mmol/L    Anion Gap, Serum: 9 mmol/L    Blood Urea Nitrogen, Serum: 12 mg/dL    Creatinine, Serum: 0.67 mg/dL    Glucose, Serum: 115 mg/dL    Calcium, Total Serum: 8.9 mg/dL    eGFR if Non : 99: Interpretative comment  The units for eGFR are mL/min/1.73M2 (normalized body surface area). The  eGFR is calculated from a serum creatinine using the CKD-EPI equation.  Other variables required for calculation are race, age and sex. Among  patients with chronic kidney disease (CKD), the eGFR is useful in  determining the stage of disease according to KDOQI CKD classification.  All eGFR results are reported numerically with the following  interpretation.          GFR                    With                 Without     (ml/min/1.73 m2)    Kidney Damage       Kidney Damage        >= 90                    Stage 1                     Normal        60-89                    Stage 2                     Decreased GFR        30-59     Stage 3                     Stage 3        15-29                    Stage 4                     Stage 4        < 15                      Stage 5                     Stage 5  Each stage of CKD assumes that the associated GFR level has been in  effect for at least 3 months. Determination of stages one and two (with  eGFR > 59 ml/min/m2) requires estimation of kidney damage for at least 3  months as defined by structural or functional abnormalities.  Limitations: All estimates of GFR will be less accurate for patients at  extremes of muscle mass (including but not limited to frail elderly,  critically ill, or cancer patients), those with unusual diets, and those  with conditions associated with reduced secretion or extrarenal  elimination of creatinine. The eGFR equation is not recommended for use  in patients with unstable creatinine levels. mL/min/1.73M2    eGFR if African American: 115 mL/min/1.73M2            RECENT CULTURES:      MICROBIOLOGY:          Radiology:    < from: Xray Chest 1 View- PORTABLE-Routine (10.20.19 @ 05:48) >  EXAM:  XR CHEST PORTABLE ROUTINE 1V                            PROCEDURE DATE:  10/20/2019            INTERPRETATION:  Indication: Post cardiac surgery.    Technique: Single portable view of the chest.    Comparison: 10/19/2019    Findings: The cardiac silhouette is enlarged. The patient is status post   median sternotomy. There are small bilateral pleural effusions, new.   There is new mild pulmonary edema.    Impression: New small bilateral pleural effusions and mild pulmonary   edema.                    NOLVIA HURLEY M.D., ATTENDING RADIOLOGIST  This document has been electronically signed. Oct 20 2019  9:45AM              < end of copied text >

## 2019-10-28 NOTE — H&P ADULT - PROBLEM SELECTOR PLAN 1
sternal wound   sp CABG  ID consult  follow up Blood cx and wound cx  Vanco x 1 stat  Non con Chest CT sternal wound    drainage  s/p CABG  ID consult  follow up Blood cx and wound cx  Vanco x 1 stat  Non con Chest CT

## 2019-10-28 NOTE — H&P ADULT - HISTORY OF PRESENT ILLNESS
69 yr old male direct admit from CTS office with complaints of MT  cloudy distal drainage  from sternal wound x 5 days with associated chills and no fevers.  He is sp CABG x 3 10/17 19 with uncomplicated post op course.  His history includes HTN, NIDDM and remote cigarette smoking.  Mr Oropeza is admitted for wound culture,  blood culture and ID consult.

## 2019-10-28 NOTE — H&P ADULT - NSHPREVIEWOFSYSTEMS_GEN_ALL_CORE
REVIEW OF SYSTEMS      General:	    Skin/Breast:  	  Ophthalmologic:  	  ENMT:	    Respiratory and Thorax:  	  Cardiovascular:	    Gastrointestinal:	    Genitourinary:	    Musculoskeletal:	    Neurological:	    Psychiatric:	    Hematology/Lymphatics:	    Endocrine:	    Allergic/Immunologic: REVIEW OF SYSTEMS      General:	loss of appetite,    ambulating        Respiratory   No SOB   Occasional  non productive   	  Cardiovascular:	 No Murmurs,    no chest pain or palpatations    Gastrointestinal:	  passing flatus   + BM    no abdominal pain    Genitourinary:	no frequency    no hematuria    Musculoskeletal:	 occasional  lt shoulder pain       Neurological:  moving all extremities      no defecits	    Psychiatric:	no depression  no anxiety    Hematology/Lymphatics:	no cancer history    Endocrine:	  H/O  DM2   no hypothyroid

## 2019-10-28 NOTE — H&P ADULT - NSHPPHYSICALEXAM_GEN_ALL_CORE
PHYSICAL EXAM:      Constitutional:    Eyes:    ENMT:    Neck:    Breasts:    Back:    Respiratory:    Cardiovascular:    Gastrointestinal:    Genitourinary:    Rectal:    Extremities:    Vascular:    Neurological:    Skin:    Lymph Nodes:    Musculoskeletal:    Psychiatric: PHYSICAL EXAM:      Respiratory:  No rales  no rhonchi,      no   wheezes    Cardiovascular:   RRR S1S2   no Murmurs    Gastrointestinal:   +  bowel sounds  + flatus      Extremities:  trace le edema     R  svg sites benign    Vascular:  no varicosities,         Neurological:   no focal defecits,  no parathesias    Skin:     MT   distal drainage  cloudy,  non foul smelling    Musculoskeletal:  no joint pain,  no swelling    Psychiatric:  no depression or anxiety

## 2019-10-28 NOTE — CONSULT NOTE ADULT - ASSESSMENT
69 yr old male direct admit from CTS office with complaints of MT  cloudy distal drainage  from sternal wound x 5 days with associated chills and no fevers.  He is sp CABG x 3 10/17 19 with uncomplicated post op course.  His history includes HTN, NIDDM and remote cigarette smoking.  Mr Oropeza is admitted for wound culture,  blood culture and ID consult. (28 Oct 2019 15:42)  pt notes that discharge started last Wednesday 10/23/ When the drainage didn't improve or slow down, they called the cardiothoracic office today and was told to come in  Patient coughs, but unable to bring up phlegm  pt denies trauma to chest  pt terrie left shoulder pain since the time of surgery    Pt with purulent drainage from sternum  please get blood culture x 2 sets and check cultures of discharge  would start vancomycin and meropenem  check CMP and CBC with diff  will adjust the dose of the abs to the renal function    suggest plastic evaluation of the sternal wound  check CXR- are there broken wires?= possibly from coughing  follow vancomycin levels 69 yr old male direct admit from CTS office with complaints of MT  cloudy distal drainage  from sternal wound x 5 days with associated chills and no fevers.  He is sp CABG x 3 10/17 19 with uncomplicated post op course.  His history includes HTN, NIDDM and remote cigarette smoking.  Mr Oropeza is admitted for wound culture,  blood culture and ID consult. (28 Oct 2019 15:42)  pt notes that discharge started last Wednesday 10/23/ When the drainage didn't improve or slow down, they called the cardiothoracic office today and was told to come in  Patient coughs, but unable to bring up phlegm  pt denies trauma to chest  pt terrie left shoulder pain since the time of surgery    Pt with purulent drainage from sternum  please get blood culture x 2 sets and check cultures of discharge  would start vancomycin and meropenem  check CMP and CBC with diff  will adjust the dose of the abs to the renal function    suggest plastic evaluation of the sternal wound  check CXR- are there broken wires?= possibly from coughing  follow vancomycin levels  for CAT scan of chest shortly

## 2019-10-29 ENCOUNTER — TRANSCRIPTION ENCOUNTER (OUTPATIENT)
Age: 69
End: 2019-10-29

## 2019-10-29 LAB
ANION GAP SERPL CALC-SCNC: 21 MMOL/L — HIGH (ref 5–17)
APTT BLD: 31.1 SEC — SIGNIFICANT CHANGE UP (ref 27.5–36.3)
BUN SERPL-MCNC: 21 MG/DL — SIGNIFICANT CHANGE UP (ref 7–23)
CALCIUM SERPL-MCNC: 8.5 MG/DL — SIGNIFICANT CHANGE UP (ref 8.4–10.5)
CHLORIDE SERPL-SCNC: 92 MMOL/L — LOW (ref 96–108)
CO2 SERPL-SCNC: 23 MMOL/L — SIGNIFICANT CHANGE UP (ref 22–31)
CREAT SERPL-MCNC: 0.86 MG/DL — SIGNIFICANT CHANGE UP (ref 0.5–1.3)
GLUCOSE SERPL-MCNC: 139 MG/DL — HIGH (ref 70–99)
HCT VFR BLD CALC: 27.6 % — LOW (ref 39–50)
HGB BLD-MCNC: 9.1 G/DL — LOW (ref 13–17)
INR BLD: 1.34 RATIO — HIGH (ref 0.88–1.16)
MCHC RBC-ENTMCNC: 28.7 PG — SIGNIFICANT CHANGE UP (ref 27–34)
MCHC RBC-ENTMCNC: 33 GM/DL — SIGNIFICANT CHANGE UP (ref 32–36)
MCV RBC AUTO: 87.1 FL — SIGNIFICANT CHANGE UP (ref 80–100)
NRBC # BLD: 0 /100 WBCS — SIGNIFICANT CHANGE UP (ref 0–0)
PLATELET # BLD AUTO: 152 K/UL — SIGNIFICANT CHANGE UP (ref 150–400)
POTASSIUM SERPL-MCNC: 4.7 MMOL/L — SIGNIFICANT CHANGE UP (ref 3.5–5.3)
POTASSIUM SERPL-SCNC: 4.7 MMOL/L — SIGNIFICANT CHANGE UP (ref 3.5–5.3)
PROTHROM AB SERPL-ACNC: 15.6 SEC — HIGH (ref 10–12.9)
RBC # BLD: 3.17 M/UL — LOW (ref 4.2–5.8)
RBC # FLD: 15 % — HIGH (ref 10.3–14.5)
SODIUM SERPL-SCNC: 136 MMOL/L — SIGNIFICANT CHANGE UP (ref 135–145)
WBC # BLD: 9.88 K/UL — SIGNIFICANT CHANGE UP (ref 3.8–10.5)
WBC # FLD AUTO: 9.88 K/UL — SIGNIFICANT CHANGE UP (ref 3.8–10.5)

## 2019-10-29 PROCEDURE — 99232 SBSQ HOSP IP/OBS MODERATE 35: CPT

## 2019-10-29 RX ORDER — SIMETHICONE 80 MG/1
80 TABLET, CHEWABLE ORAL
Refills: 0 | Status: DISCONTINUED | OUTPATIENT
Start: 2019-10-29 | End: 2019-10-30

## 2019-10-29 RX ORDER — METOPROLOL TARTRATE 50 MG
2.5 TABLET ORAL ONCE
Refills: 0 | Status: COMPLETED | OUTPATIENT
Start: 2019-10-29 | End: 2019-10-29

## 2019-10-29 RX ORDER — METOPROLOL TARTRATE 50 MG
25 TABLET ORAL EVERY 8 HOURS
Refills: 0 | Status: DISCONTINUED | OUTPATIENT
Start: 2019-10-29 | End: 2019-10-30

## 2019-10-29 RX ORDER — OXYCODONE AND ACETAMINOPHEN 5; 325 MG/1; MG/1
1 TABLET ORAL EVERY 4 HOURS
Refills: 0 | Status: DISCONTINUED | OUTPATIENT
Start: 2019-10-29 | End: 2019-10-30

## 2019-10-29 RX ORDER — OXYCODONE AND ACETAMINOPHEN 5; 325 MG/1; MG/1
2 TABLET ORAL EVERY 4 HOURS
Refills: 0 | Status: DISCONTINUED | OUTPATIENT
Start: 2019-10-29 | End: 2019-10-30

## 2019-10-29 RX ORDER — LIDOCAINE 4 G/100G
1 CREAM TOPICAL DAILY
Refills: 0 | Status: DISCONTINUED | OUTPATIENT
Start: 2019-10-29 | End: 2019-10-30

## 2019-10-29 RX ADMIN — CHLORHEXIDINE GLUCONATE 1 APPLICATION(S): 213 SOLUTION TOPICAL at 09:21

## 2019-10-29 RX ADMIN — SENNA PLUS 2 TABLET(S): 8.6 TABLET ORAL at 22:03

## 2019-10-29 RX ADMIN — BENZOCAINE AND MENTHOL 1 LOZENGE: 5; 1 LIQUID ORAL at 05:40

## 2019-10-29 RX ADMIN — Medication 25 MILLIGRAM(S): at 22:03

## 2019-10-29 RX ADMIN — MEROPENEM 100 MILLIGRAM(S): 1 INJECTION INTRAVENOUS at 05:40

## 2019-10-29 RX ADMIN — OXYCODONE AND ACETAMINOPHEN 2 TABLET(S): 5; 325 TABLET ORAL at 09:21

## 2019-10-29 RX ADMIN — SODIUM CHLORIDE 3 MILLILITER(S): 9 INJECTION INTRAMUSCULAR; INTRAVENOUS; SUBCUTANEOUS at 05:34

## 2019-10-29 RX ADMIN — ATORVASTATIN CALCIUM 40 MILLIGRAM(S): 80 TABLET, FILM COATED ORAL at 22:03

## 2019-10-29 RX ADMIN — Medication 250 MILLIGRAM(S): at 17:37

## 2019-10-29 RX ADMIN — SODIUM CHLORIDE 3 MILLILITER(S): 9 INJECTION INTRAMUSCULAR; INTRAVENOUS; SUBCUTANEOUS at 22:03

## 2019-10-29 RX ADMIN — OXYCODONE AND ACETAMINOPHEN 2 TABLET(S): 5; 325 TABLET ORAL at 20:20

## 2019-10-29 RX ADMIN — LIDOCAINE 1 PATCH: 4 CREAM TOPICAL at 22:02

## 2019-10-29 RX ADMIN — LIDOCAINE 1 PATCH: 4 CREAM TOPICAL at 09:25

## 2019-10-29 RX ADMIN — MEROPENEM 100 MILLIGRAM(S): 1 INJECTION INTRAVENOUS at 22:04

## 2019-10-29 RX ADMIN — Medication 25 MILLIGRAM(S): at 13:32

## 2019-10-29 RX ADMIN — OXYCODONE AND ACETAMINOPHEN 1 TABLET(S): 5; 325 TABLET ORAL at 18:36

## 2019-10-29 RX ADMIN — LIDOCAINE 1 PATCH: 4 CREAM TOPICAL at 19:58

## 2019-10-29 RX ADMIN — OXYCODONE AND ACETAMINOPHEN 1 TABLET(S): 5; 325 TABLET ORAL at 03:59

## 2019-10-29 RX ADMIN — SIMETHICONE 80 MILLIGRAM(S): 80 TABLET, CHEWABLE ORAL at 09:21

## 2019-10-29 RX ADMIN — Medication 250 MILLIGRAM(S): at 06:14

## 2019-10-29 RX ADMIN — Medication 25 MILLIGRAM(S): at 05:40

## 2019-10-29 RX ADMIN — OXYCODONE AND ACETAMINOPHEN 2 TABLET(S): 5; 325 TABLET ORAL at 22:09

## 2019-10-29 RX ADMIN — MEROPENEM 100 MILLIGRAM(S): 1 INJECTION INTRAVENOUS at 13:34

## 2019-10-29 RX ADMIN — OXYCODONE AND ACETAMINOPHEN 2 TABLET(S): 5; 325 TABLET ORAL at 10:00

## 2019-10-29 RX ADMIN — Medication 81 MILLIGRAM(S): at 12:05

## 2019-10-29 RX ADMIN — BENZOCAINE AND MENTHOL 1 LOZENGE: 5; 1 LIQUID ORAL at 22:03

## 2019-10-29 RX ADMIN — Medication 1: at 12:04

## 2019-10-29 RX ADMIN — Medication 2.5 MILLIGRAM(S): at 02:00

## 2019-10-29 RX ADMIN — OXYCODONE AND ACETAMINOPHEN 1 TABLET(S): 5; 325 TABLET ORAL at 04:30

## 2019-10-29 RX ADMIN — SODIUM CHLORIDE 3 MILLILITER(S): 9 INJECTION INTRAMUSCULAR; INTRAVENOUS; SUBCUTANEOUS at 13:35

## 2019-10-29 RX ADMIN — OXYCODONE AND ACETAMINOPHEN 1 TABLET(S): 5; 325 TABLET ORAL at 17:37

## 2019-10-29 NOTE — PHYSICAL THERAPY INITIAL EVALUATION ADULT - PERTINENT HX OF CURRENT PROBLEM, REHAB EVAL
Pt is a 69 yr old male admitted to [facility] on 10/28/19 direct admit from CTS office with complaints of MT  cloudy distal drainage  from sternal wound x 5 days with associated chills and no fevers.  He is sp CABG x 3 10/17 19 with uncomplicated post op course. Pt is a 69 yr old male admitted to [facility] on 10/28/19 direct admit from CTS office with c/o MT cloudy distal drainage from sternal wound x 5 days w/ associated chills and no fevers.  S/p C3 10/17/19. Pt now s/p Sternal debridement w/ removal of sternotomy wires (X6) w/ irrigation. OR cultures obtained. Pectoralis muscle flap on the R with pec advancement on the L. Primary closure

## 2019-10-29 NOTE — PHYSICAL THERAPY INITIAL EVALUATION ADULT - PLANNED THERAPY INTERVENTIONS, PT EVAL
1. LTG Pt will be indep to neg 9 steps using HR w/in 2 wks/bed mobility training/gait training/transfer training

## 2019-10-29 NOTE — PHYSICAL THERAPY INITIAL EVALUATION ADULT - LEVEL OF INDEPENDENCE: STAND/SIT, REHAB EVAL
independent Simple: Patient demonstrates quick and easy understanding/Patient asked questions/Verbalized Understanding

## 2019-10-29 NOTE — PHYSICAL THERAPY INITIAL EVALUATION ADULT - PRECAUTIONS/LIMITATIONS, REHAB EVAL
His history includes HTN, NIDDM and remote cigarette smoking.Pt is admitted for wound culture,  blood culture and ID consult.  Hospital course: CT Chest: Sternotomy with trace amount of fluid with extensive gas surrounding the  sternal excision. Infection not excluded. His history includes HTN, NIDDM and remote cigarette smoking.Pt is admitted for wound culture,  blood culture and ID consult.  Hospital course: CT Chest: Sternotomy with trace amount of fluid with extensive gas surrounding the  sternal excision. Infection not excluded./sternal precautions

## 2019-10-29 NOTE — PHARMACOTHERAPY INTERVENTION NOTE - COMMENTS
Patient on vancomycin 1g q12, recommended order trough pre-4th dose (10/30 AM).    Tabatha Rush, AcaciaD   [phone]

## 2019-10-29 NOTE — PROGRESS NOTE ADULT - SUBJECTIVE AND OBJECTIVE BOX
Seen and examined. Resting comfortable. No acute events overnight  VITAL SIGNS    Telemetry:  SR   Vital Signs Last 24 Hrs  T(C): 37.1 (10-29-19 @ 13:05), Max: 38 (10-28-19 @ 18:30)  T(F): 98.7 (10-29-19 @ 13:05), Max: 100.4 (10-28-19 @ 18:30)  HR: 93 (10-29-19 @ 13:05) (88 - 95)  BP: 119/69 (10-29-19 @ 13:05) (111/73 - 144/84)  RR: 18 (10-29-19 @ 13:05) (18 - 18)  SpO2: 94% (10-29-19 @ 13:05) (94% - 94%)            10-29 @ 07:01  -  10-29 @ 16:29  --------------------------------------------------------  IN: 120 mL / OUT: 0 mL / NET: 120 mL       Daily     Daily Weight in k.8 (29 Oct 2019 07:15)  Admit Wt: Drug Dosing Weight  Height (cm): 177.8 (17 Oct 2019 07:21)  Weight (kg): 74.9 (28 Oct 2019 23:57)  BMI (kg/m2): 23.7 (28 Oct 2019 23:57)  BSA (m2): 1.92 (28 Oct 2019 23:57)      CAPILLARY BLOOD GLUCOSE      POCT Blood Glucose.: 188 mg/dL (29 Oct 2019 11:55)  POCT Blood Glucose.: 143 mg/dL (29 Oct 2019 08:22)  POCT Blood Glucose.: 158 mg/dL (28 Oct 2019 21:36)  POCT Blood Glucose.: 120 mg/dL (28 Oct 2019 18:53)          MEDICATIONS  acetaminophen   Tablet .. 650 milliGRAM(s) Oral every 6 hours PRN  aspirin  chewable 81 milliGRAM(s) Oral daily  atorvastatin 40 milliGRAM(s) Oral at bedtime  benzocaine 15 mG/menthol 3.6 mG Lozenge 1 Lozenge Oral three times a day  chlorhexidine 4% Liquid 1 Application(s) Topical <User Schedule>  influenza   Vaccine 0.5 milliLiter(s) IntraMuscular once  insulin lispro (HumaLOG) corrective regimen sliding scale   SubCutaneous three times a day before meals  insulin lispro (HumaLOG) corrective regimen sliding scale   SubCutaneous at bedtime  lidocaine   Patch 1 Patch Transdermal daily  meropenem  IVPB      meropenem  IVPB 1000 milliGRAM(s) IV Intermittent every 8 hours  metoprolol tartrate 25 milliGRAM(s) Oral every 8 hours  oxycodone    5 mG/acetaminophen 325 mG 1 Tablet(s) Oral every 4 hours PRN  oxycodone    5 mG/acetaminophen 325 mG 2 Tablet(s) Oral every 4 hours PRN  senna 2 Tablet(s) Oral at bedtime  simethicone 80 milliGRAM(s) Chew four times a day PRN  sodium chloride 0.9% lock flush 10 milliLiter(s) IV Push every 1 hour PRN  sodium chloride 0.9% lock flush 3 milliLiter(s) IV Push every 8 hours  vancomycin  IVPB 1000 milliGRAM(s) IV Intermittent every 12 hours  vancomycin  IVPB          PHYSICAL EXAM    Subjective: " I feel anxious"  Neurology: alert and oriented x 3, nonfocal, no gross deficits  CV : s1s1 reg no MRGS  Sternal Wound :   distal portion opened, + moderate cloudy drainage   Lungs: CTA, equal chest expansion  Abdomen: soft, nontender, nondistended, positive bowel sounds, last bowel movement 10/29  :    voids  Extremities:  no edema,   b/l groins no hematoma, distal pulses  b/l , no calf tenderness b/l , warm and perfused b/l    LABS  10-29    136  |  92<L>  |  21  ----------------------------<  139<H>  4.7   |  23  |  0.86    Ca    8.5      29 Oct 2019 06:33                                   9.1    9.88  )-----------( 152      ( 29 Oct 2019 06:33 )             27.6          PT/INR - ( 29 Oct 2019 06:33 )   PT: 15.6 sec;   INR: 1.34 ratio         PTT - ( 29 Oct 2019 06:33 )  PTT:31.1 sec         PAST MEDICAL & SURGICAL HISTORY:  HTN (hypertension)  HLD (hyperlipidemia)  H/O impaired glucose tolerance  Coronary disease: sp  CABG       Physical Therapy Rec:   Home  [ x ]   Home w/ PT  [  ]  Rehab  [  ]

## 2019-10-29 NOTE — PROVIDER CONTACT NOTE (OTHER) - BACKGROUND
drainage from wound s/p surgery
Pt admitted with sternal wound drainage- wound culture and ID consult.

## 2019-10-29 NOTE — PROGRESS NOTE ADULT - SUBJECTIVE AND OBJECTIVE BOX
Patient is a 69y old  Male who presents with a chief complaint of Sternal wound drainage (29 Oct 2019 16:27)    Being followed by ID for        Interval history:  pt resting quietly   less drainage   For OR tomorrow  No other acute events      PAST MEDICAL & SURGICAL HISTORY:  HTN (hypertension)  HLD (hyperlipidemia)  H/O impaired glucose tolerance  Coronary disease: sp  CABG    Allergies    No Known Allergies    Intolerances      Antimicrobials:    meropenem  IVPB      meropenem  IVPB 1000 milliGRAM(s) IV Intermittent every 8 hours  vancomycin  IVPB 1000 milliGRAM(s) IV Intermittent every 12 hours  vancomycin  IVPB        MEDICATIONS  (STANDING):  aspirin  chewable 81 milliGRAM(s) Oral daily  atorvastatin 40 milliGRAM(s) Oral at bedtime  benzocaine 15 mG/menthol 3.6 mG Lozenge 1 Lozenge Oral three times a day  chlorhexidine 4% Liquid 1 Application(s) Topical <User Schedule>  influenza   Vaccine 0.5 milliLiter(s) IntraMuscular once  insulin lispro (HumaLOG) corrective regimen sliding scale   SubCutaneous three times a day before meals  insulin lispro (HumaLOG) corrective regimen sliding scale   SubCutaneous at bedtime  lidocaine   Patch 1 Patch Transdermal daily  meropenem  IVPB      meropenem  IVPB 1000 milliGRAM(s) IV Intermittent every 8 hours  metoprolol tartrate 25 milliGRAM(s) Oral every 8 hours  senna 2 Tablet(s) Oral at bedtime  sodium chloride 0.9% lock flush 3 milliLiter(s) IV Push every 8 hours  vancomycin  IVPB 1000 milliGRAM(s) IV Intermittent every 12 hours  vancomycin  IVPB          Vital Signs Last 24 Hrs  T(C): 37.1 (10-29-19 @ 13:05), Max: 37.1 (10-29-19 @ 13:05)  T(F): 98.7 (10-29-19 @ 13:05), Max: 98.7 (10-29-19 @ 13:05)  HR: 93 (10-29-19 @ 13:05) (88 - 95)  BP: 119/69 (10-29-19 @ 13:05) (111/73 - 144/84)  BP(mean): --  RR: 18 (10-29-19 @ 13:05) (18 - 18)  SpO2: 94% (10-29-19 @ 13:05) (94% - 94%)    Physical Exam:    Constitutional well preserved,comfortable,pleasant    HEENT PERRLA EOMI,No pallor or icterus    No oral exudate or erythema    Neck supple no JVD or LN    Chest Good AE,CTA  sternal wound less drainage    CVS RRR S1 S2 WNl     Abd soft BS normal No tenderness no masses    Ext No cyanosis clubbing or edema    IV site no erythema tenderness or discharge    Joints no swelling or LOM    CNS AAO X 3 no focal    Lab Data:                          9.1    9.88  )-----------( 152      ( 29 Oct 2019 06:33 )             27.6       10-29    136  |  92<L>  |  21  ----------------------------<  139<H>  4.7   |  23  |  0.86    Ca    8.5      29 Oct 2019 06:33            Skin  10-28-19   No growth  --  --      .Blood  10-28-19   No growth to date.  --  --      < from: CT Chest No Cont (10.28.19 @ 17:57) >  EXAM:  CT CHEST                            PROCEDURE DATE:  10/28/2019            INTERPRETATION:  CLINICAL INFORMATION: Sternal drainage. Status post CABG   on 10/17/2019.    COMPARISON: None.    PROCEDURE:   CT of the Chest was performed without intravenous contrast.  Sagittal and coronal reformats were performed.      FINDINGS:    LUNGS AND AIRWAYS: Patent central airways.  Lungs are clear.    PLEURA: Small bilateral pleural effusions, right greater than left. No   pneumothorax..    MEDIASTINUM AND HUDSON: Sternotomy with trace amount of fluid with   extensive gas surrounding the sternal excision    VESSELS: Coronary artery calcifications. Aortic calcifications..    HEART: The heart is enlarged. Trace pericardial effusion.    CHEST WALL AND LOWER NECK: Sternotomy    VISUALIZED UPPER ABDOMEN: Within normal limits.    BONES: Median sternotomy. Degenerative changes.    IMPRESSION:     Sternotomy with trace amount of fluid with extensive gas surrounding the   sternal excision. Infection not excluded.    Dr. Isaac discussed these findings with FARIDA Vega on 10/28/2019 6:30 PM   with read back.                CLRAENCE ISAAC M.D., RADIOLOGIST RESIDENT  This document has been electronically signed.  ENRIQUETA MADISON M.D., ATTENDING RADIOLOGIST  This document has been electronically signed. Oct 28 2019  6:55PM        < end of copied text >                WBC Count: 9.88 (10-29-19 @ 06:33)  WBC Count: 10.82 (10-28-19 @ 16:58)

## 2019-10-29 NOTE — PHYSICAL THERAPY INITIAL EVALUATION ADULT - ADDITIONAL COMMENTS
PTA pt was independent with functional mobility and ADLs without AD. Pt lives in a PH with his wife and son. Pt has 1 step to enter, per pt flt of stairs in home. Since surgery, has been ambulating however limited 2* pain.

## 2019-10-29 NOTE — PROGRESS NOTE ADULT - ASSESSMENT
69 yr old male direct admit from CTS office with complaints of MT  cloudy distal drainage  from sternal wound x 5 days with associated chills and no fevers.  He is sp CABG x 3 10/17 19 with uncomplicated post op course.  His history includes HTN, NIDDM and remote cigarette smoking.  Mr Oropeza is admitted for wound culture,  blood culture and ID consult. (28 Oct 2019 15:42)  pt notes that discharge started last Wednesday 10/23/ When the drainage didn't improve or slow down, they called the cardiothoracic office today and was told to come in  Patient coughs, but unable to bring up phlegm  pt denies trauma to chest  pt terrie left shoulder pain since the time of surgery    Pt with purulent drainage from sternum  Now on vancomycin and meropenem  await cultures   Check vancomycin levels  Please obtain ROUTINE CULTURES, AFB CULTURES AND FUNGAL CULTURES FROM OR TOMORROW

## 2019-10-30 ENCOUNTER — RESULT REVIEW (OUTPATIENT)
Age: 69
End: 2019-10-30

## 2019-10-30 ENCOUNTER — APPOINTMENT (OUTPATIENT)
Dept: CARDIOTHORACIC SURGERY | Facility: CLINIC | Age: 69
End: 2019-10-30

## 2019-10-30 LAB
ALBUMIN SERPL ELPH-MCNC: 2.5 G/DL — LOW (ref 3.3–5)
ALP SERPL-CCNC: 97 U/L — SIGNIFICANT CHANGE UP (ref 40–120)
ALT FLD-CCNC: 22 U/L — SIGNIFICANT CHANGE UP (ref 10–45)
ANION GAP SERPL CALC-SCNC: 10 MMOL/L — SIGNIFICANT CHANGE UP (ref 5–17)
ANION GAP SERPL CALC-SCNC: 14 MMOL/L — SIGNIFICANT CHANGE UP (ref 5–17)
APTT BLD: 27.3 SEC — LOW (ref 27.5–36.3)
AST SERPL-CCNC: 36 U/L — SIGNIFICANT CHANGE UP (ref 10–40)
BASOPHILS # BLD AUTO: 0 K/UL — SIGNIFICANT CHANGE UP (ref 0–0.2)
BASOPHILS NFR BLD AUTO: 0 % — SIGNIFICANT CHANGE UP (ref 0–2)
BILIRUB SERPL-MCNC: 0.5 MG/DL — SIGNIFICANT CHANGE UP (ref 0.2–1.2)
BUN SERPL-MCNC: 16 MG/DL — SIGNIFICANT CHANGE UP (ref 7–23)
BUN SERPL-MCNC: 17 MG/DL — SIGNIFICANT CHANGE UP (ref 7–23)
CALCIUM SERPL-MCNC: 8 MG/DL — LOW (ref 8.4–10.5)
CALCIUM SERPL-MCNC: 8.5 MG/DL — SIGNIFICANT CHANGE UP (ref 8.4–10.5)
CHLORIDE SERPL-SCNC: 100 MMOL/L — SIGNIFICANT CHANGE UP (ref 96–108)
CHLORIDE SERPL-SCNC: 102 MMOL/L — SIGNIFICANT CHANGE UP (ref 96–108)
CK MB BLD-MCNC: 1.2 % — SIGNIFICANT CHANGE UP (ref 0–3.5)
CK MB CFR SERPL CALC: 4.2 NG/ML — SIGNIFICANT CHANGE UP (ref 0–6.7)
CK SERPL-CCNC: 340 U/L — HIGH (ref 30–200)
CO2 SERPL-SCNC: 20 MMOL/L — LOW (ref 22–31)
CO2 SERPL-SCNC: 25 MMOL/L — SIGNIFICANT CHANGE UP (ref 22–31)
CREAT SERPL-MCNC: 0.82 MG/DL — SIGNIFICANT CHANGE UP (ref 0.5–1.3)
CREAT SERPL-MCNC: 0.85 MG/DL — SIGNIFICANT CHANGE UP (ref 0.5–1.3)
CULTURE RESULTS: NO GROWTH — SIGNIFICANT CHANGE UP
EOSINOPHIL # BLD AUTO: 0.32 K/UL — SIGNIFICANT CHANGE UP (ref 0–0.5)
EOSINOPHIL NFR BLD AUTO: 3 % — SIGNIFICANT CHANGE UP (ref 0–6)
GAS PNL BLDA: SIGNIFICANT CHANGE UP
GLUCOSE SERPL-MCNC: 136 MG/DL — HIGH (ref 70–99)
GLUCOSE SERPL-MCNC: 145 MG/DL — HIGH (ref 70–99)
HCT VFR BLD CALC: 27.4 % — LOW (ref 39–50)
HCT VFR BLD CALC: 27.7 % — LOW (ref 39–50)
HGB BLD-MCNC: 9.1 G/DL — LOW (ref 13–17)
HGB BLD-MCNC: 9.2 G/DL — LOW (ref 13–17)
INR BLD: 1.27 RATIO — HIGH (ref 0.88–1.16)
INR BLD: 1.34 RATIO — HIGH (ref 0.88–1.16)
LYMPHOCYTES # BLD AUTO: 0.75 K/UL — LOW (ref 1–3.3)
LYMPHOCYTES # BLD AUTO: 7 % — LOW (ref 13–44)
MAGNESIUM SERPL-MCNC: 2 MG/DL — SIGNIFICANT CHANGE UP (ref 1.6–2.6)
MCHC RBC-ENTMCNC: 28.3 PG — SIGNIFICANT CHANGE UP (ref 27–34)
MCHC RBC-ENTMCNC: 28.6 PG — SIGNIFICANT CHANGE UP (ref 27–34)
MCHC RBC-ENTMCNC: 32.9 GM/DL — SIGNIFICANT CHANGE UP (ref 32–36)
MCHC RBC-ENTMCNC: 33.6 GM/DL — SIGNIFICANT CHANGE UP (ref 32–36)
MCV RBC AUTO: 85.1 FL — SIGNIFICANT CHANGE UP (ref 80–100)
MCV RBC AUTO: 86.3 FL — SIGNIFICANT CHANGE UP (ref 80–100)
MONOCYTES # BLD AUTO: 0.85 K/UL — SIGNIFICANT CHANGE UP (ref 0–0.9)
MONOCYTES NFR BLD AUTO: 8 % — SIGNIFICANT CHANGE UP (ref 2–14)
NEUTROPHILS # BLD AUTO: 8.54 K/UL — HIGH (ref 1.8–7.4)
NEUTROPHILS NFR BLD AUTO: 78 % — HIGH (ref 43–77)
NRBC # BLD: 0 /100 WBCS — SIGNIFICANT CHANGE UP (ref 0–0)
PHOSPHATE SERPL-MCNC: 4.7 MG/DL — HIGH (ref 2.5–4.5)
PLATELET # BLD AUTO: 149 K/UL — LOW (ref 150–400)
PLATELET # BLD AUTO: 213 K/UL — SIGNIFICANT CHANGE UP (ref 150–400)
POTASSIUM SERPL-MCNC: 4.3 MMOL/L — SIGNIFICANT CHANGE UP (ref 3.5–5.3)
POTASSIUM SERPL-MCNC: 5.1 MMOL/L — SIGNIFICANT CHANGE UP (ref 3.5–5.3)
POTASSIUM SERPL-SCNC: 4.3 MMOL/L — SIGNIFICANT CHANGE UP (ref 3.5–5.3)
POTASSIUM SERPL-SCNC: 5.1 MMOL/L — SIGNIFICANT CHANGE UP (ref 3.5–5.3)
PROT SERPL-MCNC: 5.5 G/DL — LOW (ref 6–8.3)
PROTHROM AB SERPL-ACNC: 14.7 SEC — HIGH (ref 10–12.9)
PROTHROM AB SERPL-ACNC: 15.4 SEC — HIGH (ref 10–12.9)
RBC # BLD: 3.21 M/UL — LOW (ref 4.2–5.8)
RBC # BLD: 3.22 M/UL — LOW (ref 4.2–5.8)
RBC # FLD: 14.8 % — HIGH (ref 10.3–14.5)
RBC # FLD: 14.9 % — HIGH (ref 10.3–14.5)
SODIUM SERPL-SCNC: 135 MMOL/L — SIGNIFICANT CHANGE UP (ref 135–145)
SODIUM SERPL-SCNC: 136 MMOL/L — SIGNIFICANT CHANGE UP (ref 135–145)
SPECIMEN SOURCE: SIGNIFICANT CHANGE UP
TROPONIN T, HIGH SENSITIVITY RESULT: 128 NG/L — HIGH (ref 0–51)
VANCOMYCIN TROUGH SERPL-MCNC: 13.1 UG/ML — SIGNIFICANT CHANGE UP (ref 10–20)
WBC # BLD: 10.68 K/UL — HIGH (ref 3.8–10.5)
WBC # BLD: 9.35 K/UL — SIGNIFICANT CHANGE UP (ref 3.8–10.5)
WBC # FLD AUTO: 10.68 K/UL — HIGH (ref 3.8–10.5)
WBC # FLD AUTO: 9.35 K/UL — SIGNIFICANT CHANGE UP (ref 3.8–10.5)

## 2019-10-30 PROCEDURE — 88300 SURGICAL PATH GROSS: CPT | Mod: 26

## 2019-10-30 PROCEDURE — 99291 CRITICAL CARE FIRST HOUR: CPT

## 2019-10-30 PROCEDURE — 93010 ELECTROCARDIOGRAM REPORT: CPT

## 2019-10-30 RX ORDER — POTASSIUM CHLORIDE 20 MEQ
10 PACKET (EA) ORAL
Refills: 0 | Status: DISCONTINUED | OUTPATIENT
Start: 2019-10-30 | End: 2019-10-31

## 2019-10-30 RX ORDER — MEPERIDINE HYDROCHLORIDE 50 MG/ML
25 INJECTION INTRAMUSCULAR; INTRAVENOUS; SUBCUTANEOUS ONCE
Refills: 0 | Status: DISCONTINUED | OUTPATIENT
Start: 2019-10-30 | End: 2019-10-31

## 2019-10-30 RX ORDER — HYDROMORPHONE HYDROCHLORIDE 2 MG/ML
0.5 INJECTION INTRAMUSCULAR; INTRAVENOUS; SUBCUTANEOUS ONCE
Refills: 0 | Status: DISCONTINUED | OUTPATIENT
Start: 2019-10-30 | End: 2019-10-30

## 2019-10-30 RX ORDER — SODIUM CHLORIDE 9 MG/ML
1000 INJECTION INTRAMUSCULAR; INTRAVENOUS; SUBCUTANEOUS
Refills: 0 | Status: DISCONTINUED | OUTPATIENT
Start: 2019-10-30 | End: 2019-11-06

## 2019-10-30 RX ORDER — PANTOPRAZOLE SODIUM 20 MG/1
40 TABLET, DELAYED RELEASE ORAL DAILY
Refills: 0 | Status: DISCONTINUED | OUTPATIENT
Start: 2019-10-30 | End: 2019-10-31

## 2019-10-30 RX ORDER — DEXTROSE 50 % IN WATER 50 %
50 SYRINGE (ML) INTRAVENOUS
Refills: 0 | Status: DISCONTINUED | OUTPATIENT
Start: 2019-10-30 | End: 2019-11-06

## 2019-10-30 RX ORDER — ALBUMIN HUMAN 25 %
250 VIAL (ML) INTRAVENOUS
Refills: 0 | Status: COMPLETED | OUTPATIENT
Start: 2019-10-30 | End: 2019-10-30

## 2019-10-30 RX ORDER — HYDROMORPHONE HYDROCHLORIDE 2 MG/ML
0.25 INJECTION INTRAMUSCULAR; INTRAVENOUS; SUBCUTANEOUS
Refills: 0 | Status: DISCONTINUED | OUTPATIENT
Start: 2019-10-30 | End: 2019-10-31

## 2019-10-30 RX ORDER — HYDROMORPHONE HYDROCHLORIDE 2 MG/ML
0.25 INJECTION INTRAMUSCULAR; INTRAVENOUS; SUBCUTANEOUS ONCE
Refills: 0 | Status: DISCONTINUED | OUTPATIENT
Start: 2019-10-30 | End: 2019-10-30

## 2019-10-30 RX ORDER — MAGNESIUM SULFATE 500 MG/ML
1 VIAL (ML) INJECTION ONCE
Refills: 0 | Status: COMPLETED | OUTPATIENT
Start: 2019-10-30 | End: 2019-10-31

## 2019-10-30 RX ORDER — OXYCODONE AND ACETAMINOPHEN 5; 325 MG/1; MG/1
2 TABLET ORAL EVERY 6 HOURS
Refills: 0 | Status: DISCONTINUED | OUTPATIENT
Start: 2019-10-30 | End: 2019-10-31

## 2019-10-30 RX ORDER — MEROPENEM 1 G/30ML
1000 INJECTION INTRAVENOUS EVERY 8 HOURS
Refills: 0 | Status: DISCONTINUED | OUTPATIENT
Start: 2019-10-30 | End: 2019-11-06

## 2019-10-30 RX ORDER — POLYETHYLENE GLYCOL 3350 17 G/17G
17 POWDER, FOR SOLUTION ORAL DAILY
Refills: 0 | Status: DISCONTINUED | OUTPATIENT
Start: 2019-10-30 | End: 2019-11-06

## 2019-10-30 RX ORDER — ONDANSETRON 8 MG/1
4 TABLET, FILM COATED ORAL ONCE
Refills: 0 | Status: DISCONTINUED | OUTPATIENT
Start: 2019-10-30 | End: 2019-11-06

## 2019-10-30 RX ORDER — DEXTROSE 50 % IN WATER 50 %
25 SYRINGE (ML) INTRAVENOUS
Refills: 0 | Status: DISCONTINUED | OUTPATIENT
Start: 2019-10-30 | End: 2019-11-06

## 2019-10-30 RX ORDER — METOPROLOL TARTRATE 50 MG
2.5 TABLET ORAL ONCE
Refills: 0 | Status: COMPLETED | OUTPATIENT
Start: 2019-10-30 | End: 2019-10-30

## 2019-10-30 RX ORDER — VANCOMYCIN HCL 1 G
1000 VIAL (EA) INTRAVENOUS EVERY 12 HOURS
Refills: 0 | Status: DISCONTINUED | OUTPATIENT
Start: 2019-10-30 | End: 2019-11-06

## 2019-10-30 RX ORDER — OXYCODONE AND ACETAMINOPHEN 5; 325 MG/1; MG/1
1 TABLET ORAL EVERY 4 HOURS
Refills: 0 | Status: DISCONTINUED | OUTPATIENT
Start: 2019-10-30 | End: 2019-10-31

## 2019-10-30 RX ORDER — INSULIN HUMAN 100 [IU]/ML
3 INJECTION, SOLUTION SUBCUTANEOUS
Qty: 100 | Refills: 0 | Status: DISCONTINUED | OUTPATIENT
Start: 2019-10-30 | End: 2019-10-31

## 2019-10-30 RX ORDER — CHLORHEXIDINE GLUCONATE 213 G/1000ML
1 SOLUTION TOPICAL
Refills: 0 | Status: DISCONTINUED | OUTPATIENT
Start: 2019-10-30 | End: 2019-11-06

## 2019-10-30 RX ORDER — SODIUM CHLORIDE 9 MG/ML
500 INJECTION, SOLUTION INTRAVENOUS
Refills: 0 | Status: COMPLETED | OUTPATIENT
Start: 2019-10-30 | End: 2019-10-30

## 2019-10-30 RX ORDER — ACETAMINOPHEN 500 MG
1000 TABLET ORAL ONCE
Refills: 0 | Status: COMPLETED | OUTPATIENT
Start: 2019-10-30 | End: 2019-10-30

## 2019-10-30 RX ORDER — CHLORHEXIDINE GLUCONATE 213 G/1000ML
15 SOLUTION TOPICAL EVERY 12 HOURS
Refills: 0 | Status: DISCONTINUED | OUTPATIENT
Start: 2019-10-30 | End: 2019-10-30

## 2019-10-30 RX ADMIN — CHLORHEXIDINE GLUCONATE 1 APPLICATION(S): 213 SOLUTION TOPICAL at 05:13

## 2019-10-30 RX ADMIN — MEROPENEM 100 MILLIGRAM(S): 1 INJECTION INTRAVENOUS at 05:12

## 2019-10-30 RX ADMIN — Medication 250 MILLIGRAM(S): at 19:30

## 2019-10-30 RX ADMIN — HYDROMORPHONE HYDROCHLORIDE 0.25 MILLIGRAM(S): 2 INJECTION INTRAMUSCULAR; INTRAVENOUS; SUBCUTANEOUS at 13:08

## 2019-10-30 RX ADMIN — HYDROMORPHONE HYDROCHLORIDE 0.5 MILLIGRAM(S): 2 INJECTION INTRAMUSCULAR; INTRAVENOUS; SUBCUTANEOUS at 21:00

## 2019-10-30 RX ADMIN — Medication 2.5 MILLIGRAM(S): at 22:26

## 2019-10-30 RX ADMIN — HYDROMORPHONE HYDROCHLORIDE 0.5 MILLIGRAM(S): 2 INJECTION INTRAMUSCULAR; INTRAVENOUS; SUBCUTANEOUS at 19:00

## 2019-10-30 RX ADMIN — Medication 100 GRAM(S): at 23:00

## 2019-10-30 RX ADMIN — Medication 250 MILLIGRAM(S): at 05:12

## 2019-10-30 RX ADMIN — Medication 400 MILLIGRAM(S): at 22:26

## 2019-10-30 RX ADMIN — HYDROMORPHONE HYDROCHLORIDE 0.5 MILLIGRAM(S): 2 INJECTION INTRAMUSCULAR; INTRAVENOUS; SUBCUTANEOUS at 19:15

## 2019-10-30 RX ADMIN — MEROPENEM 100 MILLIGRAM(S): 1 INJECTION INTRAVENOUS at 14:15

## 2019-10-30 RX ADMIN — HYDROMORPHONE HYDROCHLORIDE 0.5 MILLIGRAM(S): 2 INJECTION INTRAMUSCULAR; INTRAVENOUS; SUBCUTANEOUS at 21:15

## 2019-10-30 RX ADMIN — Medication 1000 MILLIGRAM(S): at 22:45

## 2019-10-30 RX ADMIN — MEROPENEM 100 MILLIGRAM(S): 1 INJECTION INTRAVENOUS at 21:30

## 2019-10-30 RX ADMIN — SODIUM CHLORIDE 500 MILLILITER(S): 9 INJECTION, SOLUTION INTRAVENOUS at 22:19

## 2019-10-30 RX ADMIN — HYDROMORPHONE HYDROCHLORIDE 0.25 MILLIGRAM(S): 2 INJECTION INTRAMUSCULAR; INTRAVENOUS; SUBCUTANEOUS at 12:38

## 2019-10-30 RX ADMIN — SODIUM CHLORIDE 3 MILLILITER(S): 9 INJECTION INTRAMUSCULAR; INTRAVENOUS; SUBCUTANEOUS at 05:12

## 2019-10-30 RX ADMIN — Medication 125 MILLILITER(S): at 21:21

## 2019-10-30 RX ADMIN — Medication 25 MILLIGRAM(S): at 05:12

## 2019-10-30 RX ADMIN — BENZOCAINE AND MENTHOL 1 LOZENGE: 5; 1 LIQUID ORAL at 05:12

## 2019-10-30 RX ADMIN — Medication 125 MILLILITER(S): at 20:05

## 2019-10-30 RX ADMIN — Medication 125 MILLILITER(S): at 20:30

## 2019-10-30 RX ADMIN — LIDOCAINE 1 PATCH: 4 CREAM TOPICAL at 12:17

## 2019-10-30 NOTE — PROGRESS NOTE ADULT - PROBLEM SELECTOR PLAN 1
NPO for OR today  sternal wound debridement on 10/30/16 with Dr Shell  f/u cultures  ID following   Continue with Vancomycin NPO for OR today  sternal wound debridement on 10/30/16 with Dr Shell  f/u cultures  ID following   Continue with Vancomycin 1 Gram IV Q12 and Meropenum 1 Gram IV every 8 hours

## 2019-10-30 NOTE — AIRWAY REMOVAL NOTE  ADULT & PEDS - ARTIFICAL AIRWAY REMOVAL COMMENTS
Written order for extubation verified. The patient was identified by full name and birth date compared to the identification band. Present during the procedure was RT Felix and OLEGARIO Oseguera.

## 2019-10-30 NOTE — BRIEF OPERATIVE NOTE - NSICDXBRIEFPROCEDURE_GEN_ALL_CORE_FT
PROCEDURES:  Closure using pectoralis major muscle flap 30-Oct-2019 19:02:48  Nathalie Deng  Sternal debridement 30-Oct-2019 19:02:37  Nathalie Deng

## 2019-10-30 NOTE — BRIEF OPERATIVE NOTE - OPERATION/FINDINGS
Sternal debridement with removal of sternotomy wires (X6) with irrigation. OR cultures obtained. Pectoralis muscle flap on the R with pec advancement on the L. Primary closure

## 2019-10-30 NOTE — PROGRESS NOTE ADULT - SUBJECTIVE AND OBJECTIVE BOX
VITAL SIGNS    Subjective: "I'm ok." Denies CP, palpitation, SOB, HORTA, HA, dizziness, N/V/D, fever or chills.  No acute event noted overnight.     Telemetry:  NSR / ST 's     Vital Signs Last 24 Hrs  T(C): 36.7 (10-30-19 @ 08:30), Max: 37.8 (10-29-19 @ 21:15)  T(F): 98 (10-30-19 @ 08:30), Max: 100 (10-29-19 @ 21:15)  HR: 92 (10-30-19 @ 08:30) (92 - 98)  BP: 142/87 (10-30-19 @ 08:30) (119/69 - 142/87)  RR: 24 (10-30-19 @ 08:30) (17 - 24)  SpO2: 93% (10-30-19 @ 08:30) (93% - 96%)           10-29 @ 07:01  -  10-30 @ 07:00  --------------------------------------------------------  IN: 220 mL / OUT: 0 mL / NET: 220 mL    CAPILLARY BLOOD GLUCOSE    POCT Blood Glucose.: 150 mg/dL (30 Oct 2019 08:15)  POCT Blood Glucose.: 139 mg/dL (29 Oct 2019 21:44)  POCT Blood Glucose.: 137 mg/dL (29 Oct 2019 17:01)  POCT Blood Glucose.: 188 mg/dL (29 Oct 2019 11:55)     PHYSICAL EXAM    Neurology: alert and oriented x 3, nonfocal, no gross deficits    CV: (+) S1 and S2, No murmurs, rubs, gallops or clicks     Sternal Wound:  MSI -->with DSD --> CDI , sternum stable    Lungs: CTA B/L     Abdomen: soft, nontender, nondistended, positive bowel sounds, (+) Flatus; (+) BM     :  Voiding               Extremities:  B/L LE (-) edema; negative calf tenderness; (+) 2 DP palpable; Right IJ single lumen CVC --> occlusive dressing --> C/D/I         acetaminophen   Tablet .. 650 milliGRAM(s) Oral every 6 hours PRN  aspirin  chewable 81 milliGRAM(s) Oral daily  atorvastatin 40 milliGRAM(s) Oral at bedtime  benzocaine 15 mG/menthol 3.6 mG Lozenge 1 Lozenge Oral three times a day  chlorhexidine 4% Liquid 1 Application(s) Topical <User Schedule>  influenza Vaccine 0.5 milliLiter(s) IntraMuscular once  insulin lispro (HumaLOG) corrective regimen sliding scale   SubCutaneous three times a day before meals  insulin lispro (HumaLOG) corrective regimen sliding scale   SubCutaneous at bedtime  lidocaine Patch 1 Patch Transdermal daily  meropenem  IVPB 1000 milliGRAM(s) IV Intermittent every 8 hours  metoprolol tartrate 25 milliGRAM(s) Oral every 8 hours  oxycodone 5 mG/acetaminophen 325 mG 1 Tablet(s) Oral every 4 hours PRN  oxycodone 5 mG/acetaminophen 325 mG 2 Tablet(s) Oral every 4 hours PRN  senna 2 Tablet(s) Oral at bedtime  simethicone 80 milliGRAM(s) Chew four times a day PRN  vancomycin  IVPB 1000 milliGRAM(s) IV Intermittent every 12 hours    Physical Therapy Rec:   Home  [  ]   Home w/ PT  [ X ]  Rehab  [  ]    Discussed with Cardiothoracic Team at AM rounds. VITAL SIGNS    Subjective: "I'm ok." Denies CP, palpitation, SOB, HORTA, HA, dizziness, N/V/D, fever or chills.  No acute event noted overnight.     Telemetry:  NSR / ST 's     Vital Signs Last 24 Hrs  T(C): 36.7 (10-30-19 @ 08:30), Max: 37.8 (10-29-19 @ 21:15)  T(F): 98 (10-30-19 @ 08:30), Max: 100 (10-29-19 @ 21:15)  HR: 92 (10-30-19 @ 08:30) (92 - 98)  BP: 142/87 (10-30-19 @ 08:30) (119/69 - 142/87)  RR: 24 (10-30-19 @ 08:30) (17 - 24)  SpO2: 93% (10-30-19 @ 08:30) (93% - 96%)           10-29 @ 07:01  -  10-30 @ 07:00  --------------------------------------------------------  IN: 220 mL / OUT: 0 mL / NET: 220 mL    CAPILLARY BLOOD GLUCOSE    POCT Blood Glucose.: 150 mg/dL (30 Oct 2019 08:15)  POCT Blood Glucose.: 139 mg/dL (29 Oct 2019 21:44)  POCT Blood Glucose.: 137 mg/dL (29 Oct 2019 17:01)  POCT Blood Glucose.: 188 mg/dL (29 Oct 2019 11:55)     PHYSICAL EXAM    Neurology: alert and oriented x 3, nonfocal, no gross deficits    CV: (+) S1 and S2, No murmurs, rubs, gallops or clicks     Sternal Wound:  MSI -->with DSD --> with large amount of yellowish purulent drainage --> DSD changed;  sternum stable    Lungs: CTA B/L     Abdomen: soft, nontender, nondistended, positive bowel sounds, (+) Flatus; (+) BM     :  Voiding               Extremities:  B/L LE (-) edema; negative calf tenderness; (+) 2 DP palpable; Right IJ Tripple lumen CVC --> occlusive dressing --> C/D/I         acetaminophen Tablet .. 650 milliGRAM(s) Oral every 6 hours PRN  aspirin  chewable 81 milliGRAM(s) Oral daily  atorvastatin 40 milliGRAM(s) Oral at bedtime  benzocaine 15 mG/menthol 3.6 mG Lozenge 1 Lozenge Oral three times a day  chlorhexidine 4% Liquid 1 Application(s) Topical <User Schedule>  influenza Vaccine 0.5 milliLiter(s) IntraMuscular once  insulin lispro (HumaLOG) corrective regimen sliding scale SubCutaneous three times a day before meals  insulin lispro (HumaLOG) corrective regimen sliding scale SubCutaneous at bedtime  lidocaine Patch 1 Patch Transdermal daily  meropenem  IVPB 1000 milliGRAM(s) IV Intermittent every 8 hours  metoprolol tartrate 25 milliGRAM(s) Oral every 8 hours  oxycodone 5 mG/acetaminophen 325 mG 1 Tablet(s) Oral every 4 hours PRN  oxycodone 5 mG/acetaminophen 325 mG 2 Tablet(s) Oral every 4 hours PRN  senna 2 Tablet(s) Oral at bedtime  simethicone 80 milliGRAM(s) Chew four times a day PRN  vancomycin  IVPB 1000 milliGRAM(s) IV Intermittent every 12 hours    Physical Therapy Rec:   Home  [  ]   Home w/ PT  [ X ]  Rehab  [  ]    Discussed with Cardiothoracic Team at AM rounds.

## 2019-10-30 NOTE — PROGRESS NOTE ADULT - SUBJECTIVE AND OBJECTIVE BOX
FRANCISCO AMARALEMARGARETTE  [id number]  Patient is a 69y old  Male who presents with a chief complaint of Sternal wound drainage (30 Oct 2019 09:09)    HPI:  69 yr old male direct admit from CTS office with complaints of MT  cloudy distal drainage  from sternal wound x 5 days with associated chills and no fevers.  He is sp CABG x 3 10/17 19 with uncomplicated post op course.  His history includes HTN, NIDDM and remote cigarette smoking.  Mr Amaral is admitted for wound culture,  blood culture and ID consult. (28 Oct 2019 15:42)      Surgery/Hospital course:  10/30/2019 sterna wound debridement with pectoralis major muscle flap    intubated , full vent support.   insulin infusion for hyperglycemia  recieved fluid challenge for oliguria  cpap wean off ventilator;       Physical Exam:  Vital Signs Last 24 Hrs  T(C): 37.1 (30 Oct 2019 20:00), Max: 37.1 (30 Oct 2019 20:00)  T(F): 98.8 (30 Oct 2019 20:00), Max: 98.8 (30 Oct 2019 20:00)  HR: 83 (30 Oct 2019 21:15) (80 - 99)  BP: 145/91 (30 Oct 2019 14:33) (126/76 - 145/91)  BP(mean): --  RR: 13 (30 Oct 2019 21:00) (13 - 24)  SpO2: 100% (30 Oct 2019 21:15) (93% - 100%)  Gen:  Awake, alert   CNS: sedated  Neck: no JVD  RES : clear , no wheezing    Chest:   + chest tubes                     CVS: Regular  rhythm. Normal S1/S2  Abd: Soft, non-distended. Bowel sounds present.  Skin: No rash.  Ext:  no edema, A Line  ============================I/O===========================   I&O's Detail    29 Oct 2019 07:01  -  30 Oct 2019 07:00  --------------------------------------------------------  IN:    Oral Fluid: 220 mL  Total IN: 220 mL    OUT:  Total OUT: 0 mL    Total NET: 220 mL      30 Oct 2019 07:01  -  30 Oct 2019 21:43  --------------------------------------------------------  IN:    Albumin 5%  - 250 mL: 500 mL    IV PiggyBack: 250 mL    sodium chloride 0.9%.: 30 mL  Total IN: 780 mL    OUT:    Bulb: 35 mL    Bulb: 110 mL    Indwelling Catheter - Urethral: 90 mL  Total OUT: 235 mL    Total NET: 545 mL        ============================ LABS =========================                        9.2    10.68 )-----------( 149      ( 30 Oct 2019 19:40 )             27.4     10-30    136  |  102  |  16  ----------------------------<  136<H>  5.1   |  20<L>  |  0.82    Ca    8.0<L>      30 Oct 2019 19:40  Phos  4.7     10-30  Mg     2.0     10-30    TPro  5.5<L>  /  Alb  2.5<L>  /  TBili  0.5  /  DBili  x   /  AST  36  /  ALT  22  /  AlkPhos  97  10-30    LIVER FUNCTIONS - ( 30 Oct 2019 19:40 )  Alb: 2.5 g/dL / Pro: 5.5 g/dL / ALK PHOS: 97 U/L / ALT: 22 U/L / AST: 36 U/L / GGT: x           PT/INR - ( 30 Oct 2019 19:32 )   PT: 14.7 sec;   INR: 1.27 ratio       PTT - ( 30 Oct 2019 19:32 )  PTT:27.3 sec  ABG - ( 30 Oct 2019 21:34 )  pH, Arterial: 7.39  pH, Blood: x     /  pCO2: 42    /  pO2: 121   / HCO3: 25    / Base Excess: .4    /  SaO2: 98          ======================Micro/Rad/Cardio=================  Culture: Reviewed   CXR: Reviewed  Echo:Reviewed  ======================================================  PAST MEDICAL & SURGICAL HISTORY:  HTN (hypertension)  HLD (hyperlipidemia)  H/O impaired glucose tolerance  Coronary disease: sp  CABG    ====================ASSESMENT ==============  OR  Acute Respiratory Failure post op  acute on chronic systolic CHF  hemodynamic instability  Anemia blood loss  Hyperglycemia  Diabetes mellitus type 2  Hypothyroidism     Plan:    10-30-19 @ 07:01  -  10-30-19 @ 21:43  --------------------------------------------------------  IN: 780 mL      ====================== NEUROLOGY=====================  HYDROmorphone  Injectable 0.25 milliGRAM(s) IV Push every 10 minutes PRN Moderate Pain (4 - 6)  HYDROmorphone  Injectable 0.5 milliGRAM(s) IV Push once  meperidine     Injectable 25 milliGRAM(s) IV Push once  ondansetron Injectable 4 milliGRAM(s) IV Push once PRN Nausea and/or Vomiting  oxycodone    5 mG/acetaminophen 325 mG 1 Tablet(s) Oral every 4 hours PRN Mild Pain (1 - 3)  oxycodone    5 mG/acetaminophen 325 mG 2 Tablet(s) Oral every 6 hours PRN Moderate Pain (4 - 6)    ==================== RESPIRATORY======================  Mechanical Ventilation:  Mode: CPAP with PS  FiO2: 40  PEEP: 5  PS: 5  MAP: 7  PIP: 11    wean to extubate    ====================CARDIOVASCULAR==================    ===================HEMATOLOGIC/ONC ===================    monitor plts and Hb/Hct  ===================== RENAL =========================  [facility] for monitoring urine output    ==================== GASTROINTESTINAL===================  albumin human  5% IVPB 250 milliLiter(s) IV Intermittent every 30 minutes  pantoprazole  Injectable 40 milliGRAM(s) IV Push daily  polyethylene glycol 3350 17 Gram(s) Oral daily  potassium chloride  10 mEq/50 mL IVPB 10 milliEquivalent(s) IV Intermittent every 1 hour  potassium chloride  10 mEq/50 mL IVPB 10 milliEquivalent(s) IV Intermittent every 1 hour  potassium chloride  10 mEq/50 mL IVPB 10 milliEquivalent(s) IV Intermittent every 1 hour  sodium chloride 0.9%. 1000 milliLiter(s) (10 mL/Hr) IV Continuous <Continuous>    =======================    ENDOCRINE  =====================  dextrose 50% Injectable 50 milliLiter(s) IV Push every 15 minutes  dextrose 50% Injectable 25 milliLiter(s) IV Push every 15 minutes  insulin regular Infusion 3 Unit(s)/Hr (3 mL/Hr) IV Continuous <Continuous>    ========================INFECTIOUS DISEASE================  meropenem  IVPB 1000 milliGRAM(s) IV Intermittent every 8 hours  vancomycin  IVPB 1000 milliGRAM(s) IV Intermittent every 12 hours    Patient requires continuous monitoring with bedside rhythm monitoring,arterial line,pulse oximetry,ventilator monitoring;intermittent blood gas analysis.  Care plan discussed with ICU care team.  patient remain critical; required more than usual post op care; I have spent 35 minutes providing non routine post op care. FRANCISCO AMARALEMARGARETTE  [id number]  Patient is a 69y old  Male who presents with a chief complaint of Sternal wound drainage (30 Oct 2019 09:09)    HPI:  69 yr old male direct admit from CTS office with complaints of MT  cloudy distal drainage  from sternal wound x 5 days with associated chills and no fevers.  He is sp CABG x 3 10/17 19 with uncomplicated post op course.  His history includes HTN, NIDDM and remote cigarette smoking.  Mr Amaral is admitted for wound culture,  blood culture and ID consult. (28 Oct 2019 15:42)      Surgery/Hospital course:  10/30/2019 sterna wound debridement with pectoralis major muscle flap    intubated , full vent support.   recieved fluid challenge for oliguria  cpap wean off ventilator;   iv loperessor       Physical Exam:  Vital Signs Last 24 Hrs  T(C): 37.1 (30 Oct 2019 20:00), Max: 37.1 (30 Oct 2019 20:00)  T(F): 98.8 (30 Oct 2019 20:00), Max: 98.8 (30 Oct 2019 20:00)  HR: 83 (30 Oct 2019 21:15) (80 - 99)  BP: 145/91 (30 Oct 2019 14:33) (126/76 - 145/91)  BP(mean): --  RR: 13 (30 Oct 2019 21:00) (13 - 24)  SpO2: 100% (30 Oct 2019 21:15) (93% - 100%)  Gen: vent support  CNS: lethargic   Neck: no JVD  RES : clear , no wheezing    Chest:   +2 chest drains, s/p debriement               CVS: Regular  rhythm. Normal S1/S2  Abd: Soft, non-distended. Bowel sounds present.  Skin: No rash.  Ext:  no edema, A Line  ============================I/O===========================   I&O's Detail    29 Oct 2019 07:01  -  30 Oct 2019 07:00  --------------------------------------------------------  IN:    Oral Fluid: 220 mL  Total IN: 220 mL    OUT:  Total OUT: 0 mL    Total NET: 220 mL      30 Oct 2019 07:01  -  30 Oct 2019 21:43  --------------------------------------------------------  IN:    Albumin 5%  - 250 mL: 500 mL    IV PiggyBack: 250 mL    sodium chloride 0.9%.: 30 mL  Total IN: 780 mL    OUT:    Bulb: 35 mL    Bulb: 110 mL    Indwelling Catheter - Urethral: 90 mL  Total OUT: 235 mL    Total NET: 545 mL        ============================ LABS =========================                        9.2    10.68 )-----------( 149      ( 30 Oct 2019 19:40 )             27.4     10-30    136  |  102  |  16  ----------------------------<  136<H>  5.1   |  20<L>  |  0.82    Ca    8.0<L>      30 Oct 2019 19:40  Phos  4.7     10-30  Mg     2.0     10-30    TPro  5.5<L>  /  Alb  2.5<L>  /  TBili  0.5  /  DBili  x   /  AST  36  /  ALT  22  /  AlkPhos  97  10-30    LIVER FUNCTIONS - ( 30 Oct 2019 19:40 )  Alb: 2.5 g/dL / Pro: 5.5 g/dL / ALK PHOS: 97 U/L / ALT: 22 U/L / AST: 36 U/L / GGT: x           PT/INR - ( 30 Oct 2019 19:32 )   PT: 14.7 sec;   INR: 1.27 ratio       PTT - ( 30 Oct 2019 19:32 )  PTT:27.3 sec  ABG - ( 30 Oct 2019 21:34 )  pH, Arterial: 7.39  pH, Blood: x     /  pCO2: 42    /  pO2: 121   / HCO3: 25    / Base Excess: .4    /  SaO2: 98          ======================Micro/Rad/Cardio=================  Culture: Reviewed   CXR: Reviewed  Echo:Reviewed  ======================================================  PAST MEDICAL & SURGICAL HISTORY:  HTN (hypertension)  HLD (hyperlipidemia)  H/O impaired glucose tolerance  Coronary disease: sp  CABG    ====================ASSESMENT ==============  10/30/2019 sterna wound debridement with pectoralis major muscle flap  Oliguria  arrhythmia  hypertension  sternal wound infection  Acute Respiratory Failure post op  Chronic systolic CHF  hyperlipidemia  CAD/ASHD  10/17/2019 h/o CABG 3L    Plan:  ====================== NEUROLOGY=====================  HYDROmorphone  Injectable 0.25 milliGRAM(s) IV Push every 10 minutes PRN Moderate Pain (4 - 6)  HYDROmorphone  Injectable 0.5 milliGRAM(s) IV Push once  meperidine     Injectable 25 milliGRAM(s) IV Push once  ondansetron Injectable 4 milliGRAM(s) IV Push once PRN Nausea and/or Vomiting  oxycodone    5 mG/acetaminophen 325 mG 1 Tablet(s) Oral every 4 hours PRN Mild Pain (1 - 3)  oxycodone    5 mG/acetaminophen 325 mG 2 Tablet(s) Oral every 6 hours PRN Moderate Pain (4 - 6)    ==================== RESPIRATORY======================  Mechanical Ventilation:  Mode: CPAP with PS 10/5 , titrate down to 5/5,   FiO2: 40  PEEP: 5  PS: 5  MAP: 7  PIP: 11  wean to extubate    ====================CARDIOVASCULAR==================  BP control.   lopressor 2.5 mg ivpush . continue A-Line monitoring  ===================HEMATOLOGIC/ONC ===================  monitor plts and Hb/Hct  ===================== RENAL =========================  [facility] for monitoring urine output  if fluid lazaro  ==================== GASTROINTESTINAL===================  albumin human  5% IVPB 250 milliLiter(s) IV Intermittent every 30 minutes  pantoprazole  Injectable 40 milliGRAM(s) IV Push daily  polyethylene glycol 3350 17 Gram(s) Oral daily  potassium chloride  10 mEq/50 mL IVPB 10 milliEquivalent(s) IV Intermittent every 1 hour  potassium chloride  10 mEq/50 mL IVPB 10 milliEquivalent(s) IV Intermittent every 1 hour  potassium chloride  10 mEq/50 mL IVPB 10 milliEquivalent(s) IV Intermittent every 1 hour  sodium chloride 0.9%. 1000 milliLiter(s) (10 mL/Hr) IV Continuous <Continuous>    =======================    ENDOCRINE  =====================  dextrose 50% Injectable 50 milliLiter(s) IV Push every 15 minutes  dextrose 50% Injectable 25 milliLiter(s) IV Push every 15 minutes  insulin regular Infusion 3 Unit(s)/Hr (3 mL/Hr) IV Continuous <Continuous>    ========================INFECTIOUS DISEASE================  meropenem  IVPB 1000 milliGRAM(s) IV Intermittent every 8 hours  vancomycin  IVPB 1000 milliGRAM(s) IV Intermittent every 12 hours    Patient requires continuous monitoring with bedside rhythm monitoring,arterial line,pulse oximetry,ventilator monitoring;intermittent blood gas analysis.  Care plan discussed with ICU care team.  patient remain critical; required more than usual post op care; I have spent 35 minutes providing non routine post op care.

## 2019-10-30 NOTE — PROGRESS NOTE ADULT - ASSESSMENT
69 yr old male re-admit with MT distal drainage x 5 days s/p C3L 10/17/19. Readmitted 10/28 for Sternal wound infection.  Hospital Course:   10/29 Blood cultures x 2 --> NTD. Wound culture NTD. Plastics following.  ID following on Vanco 1 Gram Q12 and Meropenum 1 Gram IV Q8 hours.   10/30 NPO today for Sternal wound debridement and muscle flap with Dr. Shell and Dr. Zavaleta second case today.

## 2019-10-31 LAB
ALBUMIN SERPL ELPH-MCNC: 2.7 G/DL — LOW (ref 3.3–5)
ALP SERPL-CCNC: 78 U/L — SIGNIFICANT CHANGE UP (ref 40–120)
ALT FLD-CCNC: 19 U/L — SIGNIFICANT CHANGE UP (ref 10–45)
ANION GAP SERPL CALC-SCNC: 10 MMOL/L — SIGNIFICANT CHANGE UP (ref 5–17)
AST SERPL-CCNC: 30 U/L — SIGNIFICANT CHANGE UP (ref 10–40)
BILIRUB SERPL-MCNC: 0.5 MG/DL — SIGNIFICANT CHANGE UP (ref 0.2–1.2)
BUN SERPL-MCNC: 15 MG/DL — SIGNIFICANT CHANGE UP (ref 7–23)
CALCIUM SERPL-MCNC: 8 MG/DL — LOW (ref 8.4–10.5)
CHLORIDE SERPL-SCNC: 103 MMOL/L — SIGNIFICANT CHANGE UP (ref 96–108)
CO2 SERPL-SCNC: 25 MMOL/L — SIGNIFICANT CHANGE UP (ref 22–31)
CREAT SERPL-MCNC: 0.82 MG/DL — SIGNIFICANT CHANGE UP (ref 0.5–1.3)
GAS PNL BLDV: SIGNIFICANT CHANGE UP
GLUCOSE BLDC GLUCOMTR-MCNC: 120 MG/DL — HIGH (ref 70–99)
GLUCOSE BLDC GLUCOMTR-MCNC: 130 MG/DL — HIGH (ref 70–99)
GLUCOSE BLDC GLUCOMTR-MCNC: 143 MG/DL — HIGH (ref 70–99)
GLUCOSE BLDC GLUCOMTR-MCNC: 158 MG/DL — HIGH (ref 70–99)
GLUCOSE SERPL-MCNC: 137 MG/DL — HIGH (ref 70–99)
GRAM STN FLD: SIGNIFICANT CHANGE UP
HCT VFR BLD CALC: 22.4 % — LOW (ref 39–50)
HCT VFR BLD CALC: 36.2 % — LOW (ref 39–50)
HGB BLD-MCNC: 11.6 G/DL — LOW (ref 13–17)
HGB BLD-MCNC: 7.1 G/DL — LOW (ref 13–17)
MAGNESIUM SERPL-MCNC: 2.4 MG/DL — SIGNIFICANT CHANGE UP (ref 1.6–2.6)
MCHC RBC-ENTMCNC: 28 PG — SIGNIFICANT CHANGE UP (ref 27–34)
MCHC RBC-ENTMCNC: 28.2 PG — SIGNIFICANT CHANGE UP (ref 27–34)
MCHC RBC-ENTMCNC: 31.7 GM/DL — LOW (ref 32–36)
MCHC RBC-ENTMCNC: 32 GM/DL — SIGNIFICANT CHANGE UP (ref 32–36)
MCV RBC AUTO: 88.1 FL — SIGNIFICANT CHANGE UP (ref 80–100)
MCV RBC AUTO: 88.2 FL — SIGNIFICANT CHANGE UP (ref 80–100)
NRBC # BLD: 0 /100 WBCS — SIGNIFICANT CHANGE UP (ref 0–0)
NRBC # BLD: 0 /100 WBCS — SIGNIFICANT CHANGE UP (ref 0–0)
PHOSPHATE SERPL-MCNC: 4.2 MG/DL — SIGNIFICANT CHANGE UP (ref 2.5–4.5)
PLATELET # BLD AUTO: 182 K/UL — SIGNIFICANT CHANGE UP (ref 150–400)
PLATELET # BLD AUTO: 194 K/UL — SIGNIFICANT CHANGE UP (ref 150–400)
POTASSIUM SERPL-MCNC: 4.4 MMOL/L — SIGNIFICANT CHANGE UP (ref 3.5–5.3)
POTASSIUM SERPL-SCNC: 4.4 MMOL/L — SIGNIFICANT CHANGE UP (ref 3.5–5.3)
PROT SERPL-MCNC: 5.4 G/DL — LOW (ref 6–8.3)
RBC # BLD: 2.54 M/UL — LOW (ref 4.2–5.8)
RBC # BLD: 4.11 M/UL — LOW (ref 4.2–5.8)
RBC # FLD: 14.7 % — HIGH (ref 10.3–14.5)
RBC # FLD: 15.1 % — HIGH (ref 10.3–14.5)
SODIUM SERPL-SCNC: 138 MMOL/L — SIGNIFICANT CHANGE UP (ref 135–145)
SPECIMEN SOURCE: SIGNIFICANT CHANGE UP
WBC # BLD: 10.09 K/UL — SIGNIFICANT CHANGE UP (ref 3.8–10.5)
WBC # BLD: 8.42 K/UL — SIGNIFICANT CHANGE UP (ref 3.8–10.5)
WBC # FLD AUTO: 10.09 K/UL — SIGNIFICANT CHANGE UP (ref 3.8–10.5)
WBC # FLD AUTO: 8.42 K/UL — SIGNIFICANT CHANGE UP (ref 3.8–10.5)

## 2019-10-31 PROCEDURE — 99232 SBSQ HOSP IP/OBS MODERATE 35: CPT

## 2019-10-31 PROCEDURE — 93010 ELECTROCARDIOGRAM REPORT: CPT

## 2019-10-31 PROCEDURE — 71045 X-RAY EXAM CHEST 1 VIEW: CPT | Mod: 26

## 2019-10-31 PROCEDURE — 99233 SBSQ HOSP IP/OBS HIGH 50: CPT

## 2019-10-31 RX ORDER — METOPROLOL TARTRATE 50 MG
5 TABLET ORAL ONCE
Refills: 0 | Status: COMPLETED | OUTPATIENT
Start: 2019-10-31 | End: 2019-10-31

## 2019-10-31 RX ORDER — HYDROMORPHONE HYDROCHLORIDE 2 MG/ML
0.5 INJECTION INTRAMUSCULAR; INTRAVENOUS; SUBCUTANEOUS ONCE
Refills: 0 | Status: DISCONTINUED | OUTPATIENT
Start: 2019-10-31 | End: 2019-10-31

## 2019-10-31 RX ORDER — METOPROLOL TARTRATE 50 MG
50 TABLET ORAL
Refills: 0 | Status: DISCONTINUED | OUTPATIENT
Start: 2019-10-31 | End: 2019-11-06

## 2019-10-31 RX ORDER — ASPIRIN/CALCIUM CARB/MAGNESIUM 324 MG
81 TABLET ORAL DAILY
Refills: 0 | Status: DISCONTINUED | OUTPATIENT
Start: 2019-10-31 | End: 2019-11-06

## 2019-10-31 RX ORDER — SODIUM CHLORIDE 9 MG/ML
3 INJECTION INTRAMUSCULAR; INTRAVENOUS; SUBCUTANEOUS EVERY 8 HOURS
Refills: 0 | Status: DISCONTINUED | OUTPATIENT
Start: 2019-10-31 | End: 2019-11-06

## 2019-10-31 RX ORDER — HEPARIN SODIUM 5000 [USP'U]/ML
5000 INJECTION INTRAVENOUS; SUBCUTANEOUS EVERY 8 HOURS
Refills: 0 | Status: DISCONTINUED | OUTPATIENT
Start: 2019-10-31 | End: 2019-11-06

## 2019-10-31 RX ORDER — METOPROLOL TARTRATE 50 MG
25 TABLET ORAL EVERY 8 HOURS
Refills: 0 | Status: DISCONTINUED | OUTPATIENT
Start: 2019-10-31 | End: 2019-10-31

## 2019-10-31 RX ORDER — AMIODARONE HYDROCHLORIDE 400 MG/1
150 TABLET ORAL ONCE
Refills: 0 | Status: COMPLETED | OUTPATIENT
Start: 2019-10-31 | End: 2019-10-31

## 2019-10-31 RX ORDER — OXYCODONE AND ACETAMINOPHEN 5; 325 MG/1; MG/1
2 TABLET ORAL EVERY 4 HOURS
Refills: 0 | Status: DISCONTINUED | OUTPATIENT
Start: 2019-10-31 | End: 2019-11-06

## 2019-10-31 RX ORDER — ATORVASTATIN CALCIUM 80 MG/1
40 TABLET, FILM COATED ORAL AT BEDTIME
Refills: 0 | Status: DISCONTINUED | OUTPATIENT
Start: 2019-10-31 | End: 2019-11-06

## 2019-10-31 RX ORDER — INSULIN LISPRO 100/ML
VIAL (ML) SUBCUTANEOUS
Refills: 0 | Status: DISCONTINUED | OUTPATIENT
Start: 2019-10-31 | End: 2019-11-06

## 2019-10-31 RX ADMIN — HYDROMORPHONE HYDROCHLORIDE 0.5 MILLIGRAM(S): 2 INJECTION INTRAMUSCULAR; INTRAVENOUS; SUBCUTANEOUS at 22:15

## 2019-10-31 RX ADMIN — Medication 50 MILLIGRAM(S): at 18:11

## 2019-10-31 RX ADMIN — HYDROMORPHONE HYDROCHLORIDE 0.5 MILLIGRAM(S): 2 INJECTION INTRAMUSCULAR; INTRAVENOUS; SUBCUTANEOUS at 22:30

## 2019-10-31 RX ADMIN — MEROPENEM 100 MILLIGRAM(S): 1 INJECTION INTRAVENOUS at 21:43

## 2019-10-31 RX ADMIN — Medication 250 MILLIGRAM(S): at 18:12

## 2019-10-31 RX ADMIN — Medication 1: at 13:21

## 2019-10-31 RX ADMIN — Medication 25 MILLIGRAM(S): at 08:53

## 2019-10-31 RX ADMIN — SODIUM CHLORIDE 3 MILLILITER(S): 9 INJECTION INTRAMUSCULAR; INTRAVENOUS; SUBCUTANEOUS at 21:38

## 2019-10-31 RX ADMIN — HYDROMORPHONE HYDROCHLORIDE 0.5 MILLIGRAM(S): 2 INJECTION INTRAMUSCULAR; INTRAVENOUS; SUBCUTANEOUS at 01:41

## 2019-10-31 RX ADMIN — HYDROMORPHONE HYDROCHLORIDE 0.5 MILLIGRAM(S): 2 INJECTION INTRAMUSCULAR; INTRAVENOUS; SUBCUTANEOUS at 01:56

## 2019-10-31 RX ADMIN — HYDROMORPHONE HYDROCHLORIDE 0.5 MILLIGRAM(S): 2 INJECTION INTRAMUSCULAR; INTRAVENOUS; SUBCUTANEOUS at 05:30

## 2019-10-31 RX ADMIN — HEPARIN SODIUM 5000 UNIT(S): 5000 INJECTION INTRAVENOUS; SUBCUTANEOUS at 21:43

## 2019-10-31 RX ADMIN — MEROPENEM 100 MILLIGRAM(S): 1 INJECTION INTRAVENOUS at 14:12

## 2019-10-31 RX ADMIN — SODIUM CHLORIDE 3 MILLILITER(S): 9 INJECTION INTRAMUSCULAR; INTRAVENOUS; SUBCUTANEOUS at 13:59

## 2019-10-31 RX ADMIN — OXYCODONE AND ACETAMINOPHEN 2 TABLET(S): 5; 325 TABLET ORAL at 16:51

## 2019-10-31 RX ADMIN — HYDROMORPHONE HYDROCHLORIDE 0.5 MILLIGRAM(S): 2 INJECTION INTRAMUSCULAR; INTRAVENOUS; SUBCUTANEOUS at 05:13

## 2019-10-31 RX ADMIN — Medication 250 MILLIGRAM(S): at 06:25

## 2019-10-31 RX ADMIN — CHLORHEXIDINE GLUCONATE 1 APPLICATION(S): 213 SOLUTION TOPICAL at 05:15

## 2019-10-31 RX ADMIN — PANTOPRAZOLE SODIUM 40 MILLIGRAM(S): 20 TABLET, DELAYED RELEASE ORAL at 11:51

## 2019-10-31 RX ADMIN — HYDROMORPHONE HYDROCHLORIDE 0.5 MILLIGRAM(S): 2 INJECTION INTRAMUSCULAR; INTRAVENOUS; SUBCUTANEOUS at 08:54

## 2019-10-31 RX ADMIN — OXYCODONE AND ACETAMINOPHEN 2 TABLET(S): 5; 325 TABLET ORAL at 20:15

## 2019-10-31 RX ADMIN — Medication 50 MILLIGRAM(S): at 11:51

## 2019-10-31 RX ADMIN — Medication 81 MILLIGRAM(S): at 11:51

## 2019-10-31 RX ADMIN — ATORVASTATIN CALCIUM 40 MILLIGRAM(S): 80 TABLET, FILM COATED ORAL at 21:43

## 2019-10-31 RX ADMIN — MEROPENEM 100 MILLIGRAM(S): 1 INJECTION INTRAVENOUS at 05:13

## 2019-10-31 RX ADMIN — HYDROMORPHONE HYDROCHLORIDE 0.5 MILLIGRAM(S): 2 INJECTION INTRAMUSCULAR; INTRAVENOUS; SUBCUTANEOUS at 13:22

## 2019-10-31 RX ADMIN — OXYCODONE AND ACETAMINOPHEN 2 TABLET(S): 5; 325 TABLET ORAL at 19:45

## 2019-10-31 RX ADMIN — HYDROMORPHONE HYDROCHLORIDE 0.5 MILLIGRAM(S): 2 INJECTION INTRAMUSCULAR; INTRAVENOUS; SUBCUTANEOUS at 13:58

## 2019-10-31 RX ADMIN — OXYCODONE AND ACETAMINOPHEN 2 TABLET(S): 5; 325 TABLET ORAL at 15:37

## 2019-10-31 RX ADMIN — HYDROMORPHONE HYDROCHLORIDE 0.5 MILLIGRAM(S): 2 INJECTION INTRAMUSCULAR; INTRAVENOUS; SUBCUTANEOUS at 09:49

## 2019-10-31 RX ADMIN — Medication 5 MILLIGRAM(S): at 09:48

## 2019-10-31 RX ADMIN — AMIODARONE HYDROCHLORIDE 600 MILLIGRAM(S): 400 TABLET ORAL at 11:07

## 2019-10-31 NOTE — PROGRESS NOTE ADULT - ASSESSMENT
69M PMH CAD s/p CABG 2017, HTN, HLD, DM, hx s/p CABG x3 10/17/19 c/b drainage from sternal wound and is now s/p sternal wound debridement with R pectoralis flap and L pectoralis advancement 10/30/19. Extubated, doing well in CTICU. S/p 2U pRBC with appropriate response.    - Continue prevena vac to chest  - No heavy lifting, no pushing or pulling actions w/both arms, no transferring w/b/l UE  - Rest of care per primary team  - Will follow    Plastic Surgery [phone]

## 2019-10-31 NOTE — PROGRESS NOTE ADULT - ASSESSMENT
69 yr old male direct admit from CTS office with complaints of MT  cloudy distal drainage  from sternal wound x 5 days with associated chills and no fevers.  He is sp CABG x 3 10/17 19 with uncomplicated post op course.  His history includes HTN, NIDDM and remote cigarette smoking.  Mr Oropeza is admitted for wound culture,  blood culture and ID consult. (28 Oct 2019 15:42)  pt notes that discharge started last Wednesday 10/23/ When the drainage didn't improve or slow down, they called the cardiothoracic office today and was told to come in  Patient coughs, but unable to bring up phlegm  pt denies trauma to chest  pt terrie left shoulder pain since the time of surgery    Pt s/p purulent drainage from sternum  Now on vancomycin and meropenem  await cultures   Check vancomycin levels 69 yr old male direct admit from CTS office with complaints of MT  cloudy distal drainage  from sternal wound x 5 days with associated chills and no fevers.  He is sp CABG x 3 10/17 19 with uncomplicated post op course.  His history includes HTN, NIDDM and remote cigarette smoking.  Mr Oropeza is admitted for wound culture,  blood culture and ID consult. (28 Oct 2019 15:42)  pt notes that discharge started last Wednesday 10/23/ When the drainage didn't improve or slow down, they called the cardiothoracic office today and was told to come in  Patient coughs, but unable to bring up phlegm  pt denies trauma to chest  pt terrie left shoulder pain since the time of surgery    Pt s/p purulent drainage from sternum, now S/P Sternal debridement with removal of sternotomy wires (X6) with irrigation. OR cultures obtained. Pectoralis muscle flap on the R with pec advancement on the L. Primary closure  Now on vancomycin and meropenem  await cultures   Check vancomycin levels    await routine, fungal and AFB cultures  Curious that gram stain was negative

## 2019-10-31 NOTE — PROGRESS NOTE ADULT - SUBJECTIVE AND OBJECTIVE BOX
CHEVY AMARAL  MRN-[id number]    Patient is a 69y old  Male who presents with a chief complaint of Sternal wound drainage (30 Oct 2019 21:42)    HPI:  69 yr old male with a history of coronary artery disease s/p CABG in 2017, hypertension, hyperlipidemia was found to have drainage from sternal wound site and is s/p sternal wound drainage and muscle flap placement. Per history he was a direct admit from CTS office with complaints of cloudy distal drainage  from sternal wound x 5 days with associated chills and no fevers.  He is sp CABG x 3 10/17 19 with uncomplicated post op course.  His history includes HTN, NIDDM and remote cigarette smoking. He has no complaints.     PAST MEDICAL & SURGICAL HISTORY:  HTN (hypertension)  HLD (hyperlipidemia)  H/O impaired glucose tolerance  Coronary disease: sp  CABG    FAMILY HISTORY:  No pertinent family history in first degree relatives    Social History:  Denies illicit drug use or frequent alcohol consumption. Endorses prior smoking.     Allergies  No Known Allergies    MEDICATIONS  (STANDING):  aspirin enteric coated 81 milliGRAM(s) Oral daily  chlorhexidine 2% Cloths 1 Application(s) Topical <User Schedule>  dextrose 50% Injectable 50 milliLiter(s) IV Push every 15 minutes  dextrose 50% Injectable 25 milliLiter(s) IV Push every 15 minutes  influenza   Vaccine 0.5 milliLiter(s) IntraMuscular once  insulin lispro (HumaLOG) corrective regimen sliding scale   SubCutaneous Before meals and at bedtime  insulin regular Infusion 3 Unit(s)/Hr (3 mL/Hr) IV Continuous <Continuous>  meperidine     Injectable 25 milliGRAM(s) IV Push once  meropenem  IVPB 1000 milliGRAM(s) IV Intermittent every 8 hours  pantoprazole  Injectable 40 milliGRAM(s) IV Push daily  polyethylene glycol 3350 17 Gram(s) Oral daily  potassium chloride  10 mEq/50 mL IVPB 10 milliEquivalent(s) IV Intermittent every 1 hour  potassium chloride  10 mEq/50 mL IVPB 10 milliEquivalent(s) IV Intermittent every 1 hour  potassium chloride  10 mEq/50 mL IVPB 10 milliEquivalent(s) IV Intermittent every 1 hour  sodium chloride 0.9%. 1000 milliLiter(s) (10 mL/Hr) IV Continuous <Continuous>  vancomycin  IVPB 1000 milliGRAM(s) IV Intermittent every 12 hours    MEDICATIONS  (PRN):  HYDROmorphone  Injectable 0.25 milliGRAM(s) IV Push every 10 minutes PRN Moderate Pain (4 - 6)  ondansetron Injectable 4 milliGRAM(s) IV Push once PRN Nausea and/or Vomiting  oxycodone    5 mG/acetaminophen 325 mG 1 Tablet(s) Oral every 4 hours PRN Mild Pain (1 - 3)  oxycodone    5 mG/acetaminophen 325 mG 2 Tablet(s) Oral every 6 hours PRN Moderate Pain (4 - 6)    Review of Systems:  Constitutional:  Negative for weight change, fever, malaise  HEENT:  Negative for sinus pain, hoarseness, sore throat, dysphagia, vision changes  Cardiovascular:  Negative for chest pain, palpitations, dizziness  Respiratory:  Negative for cough, wheezing, dyspnea  Gastrointestinal:  Negative for nausea, vomiting, diarrhea, melena  Musculoskeletal:  Negative for pain, swelling, stiffness   Neuro:  Negative for weakness, numbness, headache  Psych:  Negative for anxiety, depression  Endocrine:  Negative for polyuria, polydipsia, temperature Intolerance    All other systems negative unless otherwise stated    ICU Vital Signs Last 24 Hrs  T(C): 36.6 (31 Oct 2019 04:00), Max: 37.1 (30 Oct 2019 20:00)  T(F): 97.9 (31 Oct 2019 04:00), Max: 98.8 (30 Oct 2019 20:00)  HR: 99 (31 Oct 2019 07:00) (78 - 99)  BP: 165/89 (31 Oct 2019 07:00) (109/60 - 165/89)  BP(mean): 120 (31 Oct 2019 07:00) (78 - 120)  ABP: 103/89 (30 Oct 2019 23:00) (103/89 - 159/65)  ABP(mean): 95 (30 Oct 2019 23:00) (74 - 95)  RR: 17 (31 Oct 2019 07:00) (13 - 24)  SpO2: 95% (31 Oct 2019 07:00) (93% - 100%)    Daily Height in cm: 177.8 (30 Oct 2019 14:33)    Daily Weight in k.9 (31 Oct 2019 00:00)  I&O's Summary    30 Oct 2019 07:01  -  31 Oct 2019 07:00  --------------------------------------------------------  IN: 2830 mL / OUT: 942 mL / NET: 1888 mL    Physical Exam:  Gen: Alert, no apparent distress  CV: Regular rate and rhythm no murmurs, rubs or gallops  Pulm: Clear to auscultation bilaterally, no rales, rhonchi or wheezes  GI: Abd is soft, non-tender and non-distended with +BS  Ext: No clubbing, cyanosis or edema  Neuro: A+Ox3, follows commands and moves all extremities    Labs:                          7.1    8.42  )-----------( 194      ( 31 Oct 2019 00:48 )             22.4       10-    138  |  103  |  15  ----------------------------<  137<H>  4.4   |  25  |  0.82    Ca    8.0<L>      31 Oct 2019 00:48  Phos  4.2     10-  Mg     2.4     10-    TPro  5.4<L>  /  Alb  2.7<L>  /  TBili  0.5  /  DBili  x   /  AST  30  /  ALT  19  /  AlkPhos  78  10-31  PT/INR - ( 30 Oct 2019 19:32 )   PT: 14.7 sec;   INR: 1.27 ratio    PTT - ( 30 Oct 2019 19:32 )  PTT:27.3 sec    Assessment/Plan: 69 yr old male with a history of coronary artery disease s/p CABG in 2017, hypertension, hyperlipidemia was found to have drainage from sternal wound site and is s/p sternal wound drainage and muscle flap placement.     Neuro:   Pain control with Dilaudid / Tylenol IV                                           Cardiovascular:    Stable hemodynamics  Not on any pressors  Continue hemodynamic monitoring    Respiratory:  Pt is comfortable on nasal cannula    GI:  Tolerating diabetic diet  Continue bowel regimen, Protonix  Continue Zofran for nausea - PRN	          Renal:  Monitor I/Os and electrolytes    Hematologic / Oncology:  No signs of active bleeding, H/H stable  HSQ for DVT ppl    Infectious disease:  All surgical incision / chest tube sites look clean  Vancomycin and Meropenem for sternal wound infection  Cultures pending    Endocrine:  Continue Accuchecks with coverage    All clinical, lab, hemodynamic and radiographic data were reviewed and the plan was discussed with CTICU team.     Xavier Maldonado MD CHEVY AMARAL  MRN-[id number]    Patient is a 69y old  Male who presents with a chief complaint of Sternal wound drainage (30 Oct 2019 21:42)    HPI:  69 yr old male with a history of coronary artery disease s/p CABG in 2017, hypertension, hyperlipidemia was found to have drainage from sternal wound site and is s/p sternal wound drainage and muscle flap placement. Per history he was a direct admit from CTS office with complaints of cloudy distal drainage  from sternal wound x 5 days with associated chills and no fevers.  He is sp CABG x 3 10/17 19 with uncomplicated post op course.  His history includes HTN, NIDDM and remote cigarette smoking. He has no complaints.     PAST MEDICAL & SURGICAL HISTORY:  HTN (hypertension)  HLD (hyperlipidemia)  H/O impaired glucose tolerance  Coronary disease: sp  CABG    FAMILY HISTORY:  No pertinent family history in first degree relatives    Social History:  Denies illicit drug use or frequent alcohol consumption. Endorses prior smoking.     Allergies  No Known Allergies    MEDICATIONS  (STANDING):  aspirin enteric coated 81 milliGRAM(s) Oral daily  chlorhexidine 2% Cloths 1 Application(s) Topical <User Schedule>  dextrose 50% Injectable 50 milliLiter(s) IV Push every 15 minutes  dextrose 50% Injectable 25 milliLiter(s) IV Push every 15 minutes  influenza   Vaccine 0.5 milliLiter(s) IntraMuscular once  insulin lispro (HumaLOG) corrective regimen sliding scale   SubCutaneous Before meals and at bedtime  insulin regular Infusion 3 Unit(s)/Hr (3 mL/Hr) IV Continuous <Continuous>  meperidine     Injectable 25 milliGRAM(s) IV Push once  meropenem  IVPB 1000 milliGRAM(s) IV Intermittent every 8 hours  pantoprazole  Injectable 40 milliGRAM(s) IV Push daily  polyethylene glycol 3350 17 Gram(s) Oral daily  potassium chloride  10 mEq/50 mL IVPB 10 milliEquivalent(s) IV Intermittent every 1 hour  potassium chloride  10 mEq/50 mL IVPB 10 milliEquivalent(s) IV Intermittent every 1 hour  potassium chloride  10 mEq/50 mL IVPB 10 milliEquivalent(s) IV Intermittent every 1 hour  sodium chloride 0.9%. 1000 milliLiter(s) (10 mL/Hr) IV Continuous <Continuous>  vancomycin  IVPB 1000 milliGRAM(s) IV Intermittent every 12 hours    MEDICATIONS  (PRN):  HYDROmorphone  Injectable 0.25 milliGRAM(s) IV Push every 10 minutes PRN Moderate Pain (4 - 6)  ondansetron Injectable 4 milliGRAM(s) IV Push once PRN Nausea and/or Vomiting  oxycodone    5 mG/acetaminophen 325 mG 1 Tablet(s) Oral every 4 hours PRN Mild Pain (1 - 3)  oxycodone    5 mG/acetaminophen 325 mG 2 Tablet(s) Oral every 6 hours PRN Moderate Pain (4 - 6)    Review of Systems:  Constitutional:  Negative for weight change, fever, malaise  HEENT:  Negative for sinus pain, hoarseness, sore throat, dysphagia, vision changes  Cardiovascular:  Negative for chest pain, palpitations, dizziness  Respiratory:  Negative for cough, wheezing, dyspnea  Gastrointestinal:  Negative for nausea, vomiting, diarrhea, melena  Musculoskeletal:  Negative for pain, swelling, stiffness   Neuro:  Negative for weakness, numbness, headache  Psych:  Negative for anxiety, depression  Endocrine:  Negative for polyuria, polydipsia, temperature Intolerance    All other systems negative unless otherwise stated    ICU Vital Signs Last 24 Hrs  T(C): 36.6 (31 Oct 2019 04:00), Max: 37.1 (30 Oct 2019 20:00)  T(F): 97.9 (31 Oct 2019 04:00), Max: 98.8 (30 Oct 2019 20:00)  HR: 99 (31 Oct 2019 07:00) (78 - 99)  BP: 165/89 (31 Oct 2019 07:00) (109/60 - 165/89)  BP(mean): 120 (31 Oct 2019 07:00) (78 - 120)  ABP: 103/89 (30 Oct 2019 23:00) (103/89 - 159/65)  ABP(mean): 95 (30 Oct 2019 23:00) (74 - 95)  RR: 17 (31 Oct 2019 07:00) (13 - 24)  SpO2: 95% (31 Oct 2019 07:00) (93% - 100%)    Daily Height in cm: 177.8 (30 Oct 2019 14:33)    Daily Weight in k.9 (31 Oct 2019 00:00)  I&O's Summary    30 Oct 2019 07:01  -  31 Oct 2019 07:00  --------------------------------------------------------  IN: 2830 mL / OUT: 942 mL / NET: 1888 mL    Physical Exam:  Gen: Alert, no apparent distress  CV: Regular rate and rhythm no murmurs, rubs or gallops  Pulm: Clear to auscultation bilaterally, no rales, rhonchi or wheezes  GI: Abd is soft, non-tender and non-distended with +BS  Ext: No clubbing, cyanosis or edema  Neuro: A+Ox3, follows commands and moves all extremities    Labs:                          7.1    8.42  )-----------( 194      ( 31 Oct 2019 00:48 )             22.4       10-    138  |  103  |  15  ----------------------------<  137<H>  4.4   |  25  |  0.82    Ca    8.0<L>      31 Oct 2019 00:48  Phos  4.2     10-  Mg     2.4     10-    TPro  5.4<L>  /  Alb  2.7<L>  /  TBili  0.5  /  DBili  x   /  AST  30  /  ALT  19  /  AlkPhos  78  10-31  PT/INR - ( 30 Oct 2019 19:32 )   PT: 14.7 sec;   INR: 1.27 ratio    PTT - ( 30 Oct 2019 19:32 )  PTT:27.3 sec    Assessment/Plan: 69 yr old male with a history of coronary artery disease s/p CABG in 2017, hypertension, hyperlipidemia was found to have drainage from sternal wound site and is s/p sternal wound drainage and muscle flap placement.     Neuro:   Pain control with Dilaudid / Tylenol IV                                           Cardiovascular:    Stable hemodynamics  Not on any pressors, will start beta blocker  Continue hemodynamic monitoring    Respiratory:  Pt is comfortable on nasal cannula    GI:  Tolerating diabetic diet  Continue bowel regimen, Protonix  Continue Zofran for nausea - PRN	          Renal:  Monitor I/Os and electrolytes    Hematologic / Oncology:  No signs of active bleeding but received 2 units PRBC for low H/H  CBC pending  HSQ for DVT ppl    Infectious disease:  All surgical incision / chest tube sites look clean  Vancomycin and Meropenem for sternal wound infection  Cultures pending    Endocrine:  Continue Accuchecks with coverage    All clinical, lab, hemodynamic and radiographic data were reviewed and the plan was discussed with CTICU team.     Xavier Maldonado MD

## 2019-10-31 NOTE — PROGRESS NOTE ADULT - SUBJECTIVE AND OBJECTIVE BOX
Patient is a 69y old  Male who presents with a chief complaint of Sternal wound drainage (31 Oct 2019 10:24)    Being followed by ID for        Interval history:  pt now in CTU  so far no growth from cultures  No other acute events        PAST MEDICAL & SURGICAL HISTORY:  HTN (hypertension)  HLD (hyperlipidemia)  H/O impaired glucose tolerance  Coronary disease: sp  CABG    Allergies    No Known Allergies    Intolerances      Antimicrobials:    meropenem  IVPB 1000 milliGRAM(s) IV Intermittent every 8 hours  vancomycin  IVPB 1000 milliGRAM(s) IV Intermittent every 12 hours    MEDICATIONS  (STANDING):  aspirin enteric coated 81 milliGRAM(s) Oral daily  atorvastatin 40 milliGRAM(s) Oral at bedtime  chlorhexidine 2% Cloths 1 Application(s) Topical <User Schedule>  dextrose 50% Injectable 50 milliLiter(s) IV Push every 15 minutes  dextrose 50% Injectable 25 milliLiter(s) IV Push every 15 minutes  influenza   Vaccine 0.5 milliLiter(s) IntraMuscular once  insulin lispro (HumaLOG) corrective regimen sliding scale   SubCutaneous Before meals and at bedtime  meperidine     Injectable 25 milliGRAM(s) IV Push once  meropenem  IVPB 1000 milliGRAM(s) IV Intermittent every 8 hours  metoprolol tartrate 50 milliGRAM(s) Oral two times a day  pantoprazole  Injectable 40 milliGRAM(s) IV Push daily  polyethylene glycol 3350 17 Gram(s) Oral daily  potassium chloride  10 mEq/50 mL IVPB 10 milliEquivalent(s) IV Intermittent every 1 hour  potassium chloride  10 mEq/50 mL IVPB 10 milliEquivalent(s) IV Intermittent every 1 hour  potassium chloride  10 mEq/50 mL IVPB 10 milliEquivalent(s) IV Intermittent every 1 hour  sodium chloride 0.9% lock flush 3 milliLiter(s) IV Push every 8 hours  sodium chloride 0.9%. 1000 milliLiter(s) (10 mL/Hr) IV Continuous <Continuous>  vancomycin  IVPB 1000 milliGRAM(s) IV Intermittent every 12 hours      Vital Signs Last 24 Hrs  T(C): 36.7 (10-31-19 @ 16:00), Max: 37.1 (10-30-19 @ 20:00)  T(F): 98 (10-31-19 @ 16:00), Max: 98.8 (10-30-19 @ 20:00)  HR: 90 (10-31-19 @ 16:00) (78 - 102)  BP: 134/77 (10-31-19 @ 16:00) (109/60 - 177/82)  BP(mean): 99 (10-31-19 @ 16:00) (78 - 120)  RR: 18 (10-31-19 @ 16:00) (13 - 20)  SpO2: 95% (10-31-19 @ 16:00) (94% - 100%)    Physical Exam:    Constitutional well preserved,comfortable,pleasant    HEENT PERRLA EOMI,No pallor or icterus    No oral exudate or erythema    Neck supple no JVD or LN    Chest Good AE,CTA    CVS RRR S1 S2     sternal wound covered    Abd soft BS normal No tenderness     Ext No cyanosis clubbing or edema    IV site no erythema tenderness or discharge    Joints no swelling or LOM    CNS AAO X 3 no focal    Lab Data:                          11.6   10.09 )-----------( 182      ( 31 Oct 2019 09:13 )             36.2       10-31    138  |  103  |  15  ----------------------------<  137<H>  4.4   |  25  |  0.82    Ca    8.0<L>      31 Oct 2019 00:48  Phos  4.2     10-31  Mg     2.4     10-31    TPro  5.4<L>  /  Alb  2.7<L>  /  TBili  0.5  /  DBili  x   /  AST  30  /  ALT  19  /  AlkPhos  78  10-31        .Body Fluid deep culture chest  10-31-19   Testing in progress  --  --      .Tissue deep tissue culture chest  10-31-19   Testing in progress  --    No polymorphonuclear cells seen  No organisms seen      .Body Fluid Pericardial Fluid  10-31-19 --  --    polymorphonuclear leukocytes seen  No organisms seen  by cytocentrifuge      .Body Fluid superficial culture  10-31-19   Testing in progress  --    No polymorphonuclear cells seen  No organisms seen  by cytocentrifuge      Skin  10-28-19   No growth  --  --      .Blood  10-28-19   No growth to date.  --  --      Vancomycin Level, Trough: 13.1 ug/mL (10-30-19 @ 06:14)      WBC Count: 10.09 (10-31-19 @ 09:13)  WBC Count: 8.42 (10-31-19 @ 00:48)  WBC Count: 10.68 (10-30-19 @ 19:40)  WBC Count: 9.35 (10-30-19 @ 06:12)  WBC Count: 9.88 (10-29-19 @ 06:33)  WBC Count: 10.82 (10-28-19 @ 16:58) Patient is a 69y old  Male who presents with a chief complaint of Sternal wound drainage (31 Oct 2019 10:24)    Being followed by ID for        Interval history:  pt now in CTU  so far no growth from cultures  right arm in sling  No other acute events        PAST MEDICAL & SURGICAL HISTORY:  HTN (hypertension)  HLD (hyperlipidemia)  H/O impaired glucose tolerance  Coronary disease: sp  CABG    Allergies    No Known Allergies    Intolerances      Antimicrobials:    meropenem  IVPB 1000 milliGRAM(s) IV Intermittent every 8 hours  vancomycin  IVPB 1000 milliGRAM(s) IV Intermittent every 12 hours    MEDICATIONS  (STANDING):  aspirin enteric coated 81 milliGRAM(s) Oral daily  atorvastatin 40 milliGRAM(s) Oral at bedtime  chlorhexidine 2% Cloths 1 Application(s) Topical <User Schedule>  dextrose 50% Injectable 50 milliLiter(s) IV Push every 15 minutes  dextrose 50% Injectable 25 milliLiter(s) IV Push every 15 minutes  influenza   Vaccine 0.5 milliLiter(s) IntraMuscular once  insulin lispro (HumaLOG) corrective regimen sliding scale   SubCutaneous Before meals and at bedtime  meperidine     Injectable 25 milliGRAM(s) IV Push once  meropenem  IVPB 1000 milliGRAM(s) IV Intermittent every 8 hours  metoprolol tartrate 50 milliGRAM(s) Oral two times a day  pantoprazole  Injectable 40 milliGRAM(s) IV Push daily  polyethylene glycol 3350 17 Gram(s) Oral daily  potassium chloride  10 mEq/50 mL IVPB 10 milliEquivalent(s) IV Intermittent every 1 hour  potassium chloride  10 mEq/50 mL IVPB 10 milliEquivalent(s) IV Intermittent every 1 hour  potassium chloride  10 mEq/50 mL IVPB 10 milliEquivalent(s) IV Intermittent every 1 hour  sodium chloride 0.9% lock flush 3 milliLiter(s) IV Push every 8 hours  sodium chloride 0.9%. 1000 milliLiter(s) (10 mL/Hr) IV Continuous <Continuous>  vancomycin  IVPB 1000 milliGRAM(s) IV Intermittent every 12 hours      Vital Signs Last 24 Hrs  T(C): 36.7 (10-31-19 @ 16:00), Max: 37.1 (10-30-19 @ 20:00)  T(F): 98 (10-31-19 @ 16:00), Max: 98.8 (10-30-19 @ 20:00)  HR: 90 (10-31-19 @ 16:00) (78 - 102)  BP: 134/77 (10-31-19 @ 16:00) (109/60 - 177/82)  BP(mean): 99 (10-31-19 @ 16:00) (78 - 120)  RR: 18 (10-31-19 @ 16:00) (13 - 20)  SpO2: 95% (10-31-19 @ 16:00) (94% - 100%)    Physical Exam:    Constitutional well preserved,comfortable,pleasant    HEENT PERRLA EOMI,No pallor or icterus    No oral exudate or erythema    Neck supple no JVD or LN    Chest Good AE,CTA    CVS RRR S1 S2     sternal wound covered    Abd soft BS normal No tenderness     Ext No cyanosis clubbing or edema    IV site no erythema tenderness or discharge    Joints no swelling or LOM    CNS AAO X 3 no focal    Lab Data:                          11.6   10.09 )-----------( 182      ( 31 Oct 2019 09:13 )             36.2       10-31    138  |  103  |  15  ----------------------------<  137<H>  4.4   |  25  |  0.82    Ca    8.0<L>      31 Oct 2019 00:48  Phos  4.2     10-31  Mg     2.4     10-31    TPro  5.4<L>  /  Alb  2.7<L>  /  TBili  0.5  /  DBili  x   /  AST  30  /  ALT  19  /  AlkPhos  78  10-31        .Body Fluid deep culture chest  10-31-19   Testing in progress  --  --      .Tissue deep tissue culture chest  10-31-19   Testing in progress  --    No polymorphonuclear cells seen  No organisms seen      .Body Fluid Pericardial Fluid  10-31-19 --  --    polymorphonuclear leukocytes seen  No organisms seen  by cytocentrifuge      .Body Fluid superficial culture  10-31-19   Testing in progress  --    No polymorphonuclear cells seen  No organisms seen  by cytocentrifuge      Skin  10-28-19   No growth  --  --      .Blood  10-28-19   No growth to date.  --  --      Vancomycin Level, Trough: 13.1 ug/mL (10-30-19 @ 06:14)      WBC Count: 10.09 (10-31-19 @ 09:13)  WBC Count: 8.42 (10-31-19 @ 00:48)  WBC Count: 10.68 (10-30-19 @ 19:40)  WBC Count: 9.35 (10-30-19 @ 06:12)  WBC Count: 9.88 (10-29-19 @ 06:33)  WBC Count: 10.82 (10-28-19 @ 16:58)

## 2019-10-31 NOTE — PROGRESS NOTE ADULT - SUBJECTIVE AND OBJECTIVE BOX
Interval events: S/p sternal wound debridement w/ R pectoralis flap and L pectoralis advancement. Transferred to CTICU intubated, extubated overnight. Post-op CT head stable. S/p 2u pRBC this morning for Hgb 7.1, post-transfusion Hgb 11.6    S: Patient seen and examined, sitting up in chair, c/o mild pain, otherwise well.     O: Vital Signs  T(C): 36.9 (10-31 @ 08:00), Max: 37.1 (10-30 @ 20:00)  HR: 87 (10-31 @ 10:00) (78 - 102)  BP: 127/77 (10-31 @ 10:00) (109/60 - 165/89)  RR: 20 (10-31 @ 10:00) (13 - 20)  SpO2: 94% (10-31 @ 10:00) (94% - 100%)  10-30-19 @ 07:01  -  10-31-19 @ 07:00  --------------------------------------------------------  IN: 2830 mL / OUT: 942 mL / NET: 1888 mL    10-31-19 @ 07:01  -  10-31-19 @ 10:24  --------------------------------------------------------  IN: 630 mL / OUT: 130 mL / NET: 500 mL      Chest: prevena vac in place holding suction  Extremities: R arm in sling                          11.6   10.09 )-----------( 182      ( 31 Oct 2019 09:13 )             36.2   10-31    138  |  103  |  15  ----------------------------<  137<H>  4.4   |  25  |  0.82    Ca    8.0<L>      31 Oct 2019 00:48  Phos  4.2     10-31  Mg     2.4     10-31    TPro  5.4<L>  /  Alb  2.7<L>  /  TBili  0.5  /  DBili  x   /  AST  30  /  ALT  19  /  AlkPhos  78  10-31

## 2019-11-01 DIAGNOSIS — J90 PLEURAL EFFUSION, NOT ELSEWHERE CLASSIFIED: ICD-10-CM

## 2019-11-01 DIAGNOSIS — S21.101A UNSPECIFIED OPEN WOUND OF RIGHT FRONT WALL OF THORAX WITHOUT PENETRATION INTO THORACIC CAVITY, INITIAL ENCOUNTER: ICD-10-CM

## 2019-11-01 DIAGNOSIS — Z95.1 PRESENCE OF AORTOCORONARY BYPASS GRAFT: ICD-10-CM

## 2019-11-01 LAB
ALBUMIN SERPL ELPH-MCNC: 2.3 G/DL — LOW (ref 3.3–5)
ALP SERPL-CCNC: 83 U/L — SIGNIFICANT CHANGE UP (ref 40–120)
ALT FLD-CCNC: 22 U/L — SIGNIFICANT CHANGE UP (ref 10–45)
ANION GAP SERPL CALC-SCNC: 8 MMOL/L — SIGNIFICANT CHANGE UP (ref 5–17)
AST SERPL-CCNC: 32 U/L — SIGNIFICANT CHANGE UP (ref 10–40)
BILIRUB SERPL-MCNC: 0.6 MG/DL — SIGNIFICANT CHANGE UP (ref 0.2–1.2)
BUN SERPL-MCNC: 16 MG/DL — SIGNIFICANT CHANGE UP (ref 7–23)
CALCIUM SERPL-MCNC: 7.8 MG/DL — LOW (ref 8.4–10.5)
CHLORIDE SERPL-SCNC: 106 MMOL/L — SIGNIFICANT CHANGE UP (ref 96–108)
CO2 SERPL-SCNC: 22 MMOL/L — SIGNIFICANT CHANGE UP (ref 22–31)
CREAT SERPL-MCNC: 0.76 MG/DL — SIGNIFICANT CHANGE UP (ref 0.5–1.3)
GLUCOSE BLDC GLUCOMTR-MCNC: 124 MG/DL — HIGH (ref 70–99)
GLUCOSE BLDC GLUCOMTR-MCNC: 129 MG/DL — HIGH (ref 70–99)
GLUCOSE BLDC GLUCOMTR-MCNC: 131 MG/DL — HIGH (ref 70–99)
GLUCOSE BLDC GLUCOMTR-MCNC: 151 MG/DL — HIGH (ref 70–99)
GLUCOSE SERPL-MCNC: 119 MG/DL — HIGH (ref 70–99)
HCT VFR BLD CALC: 31.1 % — LOW (ref 39–50)
HGB BLD-MCNC: 10.4 G/DL — LOW (ref 13–17)
MAGNESIUM SERPL-MCNC: 2 MG/DL — SIGNIFICANT CHANGE UP (ref 1.6–2.6)
MCHC RBC-ENTMCNC: 29.3 PG — SIGNIFICANT CHANGE UP (ref 27–34)
MCHC RBC-ENTMCNC: 33.4 GM/DL — SIGNIFICANT CHANGE UP (ref 32–36)
MCV RBC AUTO: 87.6 FL — SIGNIFICANT CHANGE UP (ref 80–100)
NRBC # BLD: 0 /100 WBCS — SIGNIFICANT CHANGE UP (ref 0–0)
PHOSPHATE SERPL-MCNC: 2.6 MG/DL — SIGNIFICANT CHANGE UP (ref 2.5–4.5)
PLATELET # BLD AUTO: 194 K/UL — SIGNIFICANT CHANGE UP (ref 150–400)
POTASSIUM SERPL-MCNC: 4.4 MMOL/L — SIGNIFICANT CHANGE UP (ref 3.5–5.3)
POTASSIUM SERPL-SCNC: 4.4 MMOL/L — SIGNIFICANT CHANGE UP (ref 3.5–5.3)
PROT SERPL-MCNC: 5.2 G/DL — LOW (ref 6–8.3)
RBC # BLD: 3.55 M/UL — LOW (ref 4.2–5.8)
RBC # FLD: 15.2 % — HIGH (ref 10.3–14.5)
SODIUM SERPL-SCNC: 136 MMOL/L — SIGNIFICANT CHANGE UP (ref 135–145)
VANCOMYCIN TROUGH SERPL-MCNC: 8.4 UG/ML — LOW (ref 10–20)
WBC # BLD: 11.92 K/UL — HIGH (ref 3.8–10.5)
WBC # FLD AUTO: 11.92 K/UL — HIGH (ref 3.8–10.5)

## 2019-11-01 PROCEDURE — 71045 X-RAY EXAM CHEST 1 VIEW: CPT | Mod: 26,77

## 2019-11-01 PROCEDURE — 71045 X-RAY EXAM CHEST 1 VIEW: CPT | Mod: 26

## 2019-11-01 PROCEDURE — 76604 US EXAM CHEST: CPT | Mod: 26

## 2019-11-01 PROCEDURE — 99232 SBSQ HOSP IP/OBS MODERATE 35: CPT

## 2019-11-01 PROCEDURE — 32557 INSERT CATH PLEURA W/ IMAGE: CPT | Mod: 78

## 2019-11-01 PROCEDURE — 99233 SBSQ HOSP IP/OBS HIGH 50: CPT

## 2019-11-01 RX ORDER — AMIODARONE HYDROCHLORIDE 400 MG/1
150 TABLET ORAL ONCE
Refills: 0 | Status: COMPLETED | OUTPATIENT
Start: 2019-11-01 | End: 2019-11-01

## 2019-11-01 RX ORDER — FUROSEMIDE 40 MG
20 TABLET ORAL ONCE
Refills: 0 | Status: COMPLETED | OUTPATIENT
Start: 2019-11-01 | End: 2019-11-01

## 2019-11-01 RX ORDER — FENTANYL CITRATE 50 UG/ML
50 INJECTION INTRAVENOUS ONCE
Refills: 0 | Status: DISCONTINUED | OUTPATIENT
Start: 2019-11-01 | End: 2019-11-01

## 2019-11-01 RX ADMIN — Medication 250 MILLIGRAM(S): at 05:50

## 2019-11-01 RX ADMIN — MEROPENEM 100 MILLIGRAM(S): 1 INJECTION INTRAVENOUS at 05:04

## 2019-11-01 RX ADMIN — POLYETHYLENE GLYCOL 3350 17 GRAM(S): 17 POWDER, FOR SOLUTION ORAL at 11:12

## 2019-11-01 RX ADMIN — HEPARIN SODIUM 5000 UNIT(S): 5000 INJECTION INTRAVENOUS; SUBCUTANEOUS at 22:36

## 2019-11-01 RX ADMIN — FENTANYL CITRATE 50 MICROGRAM(S): 50 INJECTION INTRAVENOUS at 09:00

## 2019-11-01 RX ADMIN — MEROPENEM 100 MILLIGRAM(S): 1 INJECTION INTRAVENOUS at 22:34

## 2019-11-01 RX ADMIN — Medication 50 MILLIGRAM(S): at 17:32

## 2019-11-01 RX ADMIN — OXYCODONE AND ACETAMINOPHEN 2 TABLET(S): 5; 325 TABLET ORAL at 11:46

## 2019-11-01 RX ADMIN — HEPARIN SODIUM 5000 UNIT(S): 5000 INJECTION INTRAVENOUS; SUBCUTANEOUS at 05:04

## 2019-11-01 RX ADMIN — Medication 50 MILLIGRAM(S): at 05:04

## 2019-11-01 RX ADMIN — SODIUM CHLORIDE 3 MILLILITER(S): 9 INJECTION INTRAMUSCULAR; INTRAVENOUS; SUBCUTANEOUS at 22:25

## 2019-11-01 RX ADMIN — Medication 81 MILLIGRAM(S): at 11:11

## 2019-11-01 RX ADMIN — HEPARIN SODIUM 5000 UNIT(S): 5000 INJECTION INTRAVENOUS; SUBCUTANEOUS at 13:34

## 2019-11-01 RX ADMIN — CHLORHEXIDINE GLUCONATE 1 APPLICATION(S): 213 SOLUTION TOPICAL at 05:04

## 2019-11-01 RX ADMIN — Medication 1: at 08:06

## 2019-11-01 RX ADMIN — AMIODARONE HYDROCHLORIDE 600 MILLIGRAM(S): 400 TABLET ORAL at 06:26

## 2019-11-01 RX ADMIN — OXYCODONE AND ACETAMINOPHEN 2 TABLET(S): 5; 325 TABLET ORAL at 22:36

## 2019-11-01 RX ADMIN — OXYCODONE AND ACETAMINOPHEN 2 TABLET(S): 5; 325 TABLET ORAL at 17:32

## 2019-11-01 RX ADMIN — ATORVASTATIN CALCIUM 40 MILLIGRAM(S): 80 TABLET, FILM COATED ORAL at 22:36

## 2019-11-01 RX ADMIN — OXYCODONE AND ACETAMINOPHEN 2 TABLET(S): 5; 325 TABLET ORAL at 18:02

## 2019-11-01 RX ADMIN — MEROPENEM 100 MILLIGRAM(S): 1 INJECTION INTRAVENOUS at 13:34

## 2019-11-01 RX ADMIN — FENTANYL CITRATE 50 MICROGRAM(S): 50 INJECTION INTRAVENOUS at 08:43

## 2019-11-01 RX ADMIN — OXYCODONE AND ACETAMINOPHEN 2 TABLET(S): 5; 325 TABLET ORAL at 11:12

## 2019-11-01 RX ADMIN — Medication 250 MILLIGRAM(S): at 17:32

## 2019-11-01 RX ADMIN — OXYCODONE AND ACETAMINOPHEN 2 TABLET(S): 5; 325 TABLET ORAL at 07:20

## 2019-11-01 RX ADMIN — OXYCODONE AND ACETAMINOPHEN 2 TABLET(S): 5; 325 TABLET ORAL at 08:00

## 2019-11-01 RX ADMIN — SODIUM CHLORIDE 3 MILLILITER(S): 9 INJECTION INTRAMUSCULAR; INTRAVENOUS; SUBCUTANEOUS at 05:19

## 2019-11-01 RX ADMIN — Medication 20 MILLIGRAM(S): at 04:54

## 2019-11-01 RX ADMIN — SODIUM CHLORIDE 3 MILLILITER(S): 9 INJECTION INTRAMUSCULAR; INTRAVENOUS; SUBCUTANEOUS at 13:44

## 2019-11-01 RX ADMIN — OXYCODONE AND ACETAMINOPHEN 2 TABLET(S): 5; 325 TABLET ORAL at 23:06

## 2019-11-01 NOTE — PROGRESS NOTE ADULT - ASSESSMENT
69 yr old male re-admit with MT distal drainage x 5 days s/p C3L 10/17/19. Readmitted 10/28 for Sternal wound infection.  Hospital Course:   10/29 Blood cultures x 2 --> NTD. Wound culture NTD. Plastics following.  ID following on Vanco 1 Gram Q12 and Meropenum 1 Gram IV Q8 hours.   10/30: NPO today for Sternal wound debridement and muscle flap with Dr. Shell and Dr. Zavaleta-->Sternal debridement with removal of sternotomy wires with irrigation. OR cultures obtained. Pectoralis muscle flap on the R with pec advancement on the L.  10/31: Recovering in CTU. Received 2 units of PRBCs for low H&H. Vanco and Meropenem for sternal wound.   11/1: Pt transferred to [address] [place] [address] in stable condition. Prevena dressing in place to mid-sternum. 2 FELI drains on right and left mid chest with serosanguinous fluid. R pigtail placed on unit this AM for pleural effusion-continues to drain. Continuing Meropenem and Vancomycin as per ID while awaiting culture results.   Discharge planning: home when medically stable.

## 2019-11-01 NOTE — PROCEDURE NOTE - NSPOSTPRCRAD_GEN_A_CORE
central line located in the/no pneumothorax/post-procedure radiography performed
chest tube in correct position

## 2019-11-01 NOTE — CHART NOTE - NSCHARTNOTEFT_GEN_A_CORE
morning CXR noted to have hazyness on right side, chest ultrasound done and noted to have moderately large pleural effusion. pt O2 sat 97% on 4LNC, states he occasional gets short of breath when ambulating. Lasix 20IV given, Will discuss with MD Zavaleta this morning regarding possible thoracentesis/pigtail catheter placement.

## 2019-11-01 NOTE — DIETITIAN INITIAL EVALUATION ADULT. - ENERGY NEEDS
Ht: 5'10", Admission Wt: 165.1lbs, BMI: 23.6kg/m2, IBW: 166lbs(+/-10%), 99%IBW  Pertinent information: 69 year old male with PMHx for HTN, DM, recent CABG x3 on (10/17/19) and readmitted for sternal wound infection. Pt POD 2 from sternal debridement.   No Edema, Skin: intact

## 2019-11-01 NOTE — PROGRESS NOTE ADULT - ASSESSMENT
69M PMH CAD s/p CABG 2017, HTN, HLD, DM, hx s/p CABG x3 10/17/19 c/b drainage from sternal wound and is now s/p sternal wound debridement with R pectoralis flap and L pectoralis advancement 10/30/19. Extubated, doing well in CTICU. S/p 2U pRBC with appropriate response.    - Continue prevena vac to chest  - No heavy lifting, no pushing or pulling actions w/both arms, no transferring w/b/l UE  - Rest of care per primary team  - Will follow    Plastic Surgery [phone]

## 2019-11-01 NOTE — PROGRESS NOTE ADULT - SUBJECTIVE AND OBJECTIVE BOX
Subjective: Pt states "I feel much better." Denies any CP, SOB, N/V and palpitations. No acute events overnight.     VITAL SIGNS  Telemetry:    Vital Signs Last 24 Hrs  T(C): 36.7 (19 @ 12:15), Max: 36.9 (10-31-19 @ 20:00)  T(F): 98 (19 @ 12:15), Max: 98.5 (19 @ 00:00)  HR: 89 (19 12:15) (77 - 168)  BP: 104/70 (19 @ 12:15) (104/70 - 178/101)  RR: 18 (19 12:15) (15 - 29)  SpO2: 98% (19 12:15) (93% - 100%)            10-31 @ 07:01  -   @ 07:00  --------------------------------------------------------  IN: 1930 mL / OUT: 890 mL / NET: 1040 mL     @ 07:  -   @ 13:17  --------------------------------------------------------  IN: 130 mL / OUT: 1820 mL / NET: -1690 mL    Daily Weight in k.8 (2019 07:42)    Bilirubin Total, Serum: 0.6 mg/dL ( @ 02:10)    CAPILLARY BLOOD GLUCOSE  POCT Blood Glucose.: 129 mg/dL (2019 12:19)  POCT Blood Glucose.: 151 mg/dL (2019 08:01)  POCT Blood Glucose.: 143 mg/dL (31 Oct 2019 21:51)  POCT Blood Glucose.: 130 mg/dL (31 Oct 2019 17:33)        MEDICATIONS  aspirin enteric coated 81 milliGRAM(s) Oral daily  atorvastatin 40 milliGRAM(s) Oral at bedtime  chlorhexidine 2% Cloths 1 Application(s) Topical <User Schedule>  dextrose 50% Injectable 50 milliLiter(s) IV Push every 15 minutes  dextrose 50% Injectable 25 milliLiter(s) IV Push every 15 minutes  heparin  Injectable 5000 Unit(s) SubCutaneous every 8 hours  insulin lispro (HumaLOG) corrective regimen sliding scale   SubCutaneous Before meals and at bedtime  meropenem  IVPB 1000 milliGRAM(s) IV Intermittent every 8 hours  metoprolol tartrate 50 milliGRAM(s) Oral two times a day  ondansetron Injectable 4 milliGRAM(s) IV Push once PRN  oxycodone    5 mG/acetaminophen 325 mG 2 Tablet(s) Oral every 4 hours PRN  polyethylene glycol 3350 17 Gram(s) Oral daily  sodium chloride 0.9% lock flush 3 milliLiter(s) IV Push every 8 hours  sodium chloride 0.9%. 1000 milliLiter(s) IV Continuous <Continuous>  vancomycin  IVPB 1000 milliGRAM(s) IV Intermittent every 12 hours    PHYSICAL EXAM  Neurology: A&Ox3, nonfocal, no gross deficits  CV : RRR, +S1S2  Sternum: Prevena dressing CDI over mid-sternum, FELI drain at right mid chest-CDI with serosanguinous fluid, FELI drain at left mid-chest-CDI with serosanguinous fluid  Lungs: Respirations non-labored, mildly diminished in bases, mild expiratory wheezes, on 3L O2 via NC  Abdomen: Soft, NT/ND, +BSx4Q, last BM on , (-)N/V/D  : Voiding without difficulty, bladder non-distended  Extremities: (-) B/L LE edema, negative calf tenderness, +PP , R SVG incision-CDI    LABS      136  |  106  |  16  ----------------------------<  119<H>  4.4   |  22  |  0.76    Ca    7.8<L>      2019 02:10  Phos  2.6       Mg     2.0         TPro  5.2<L>  /  Alb  2.3<L>  /  TBili  0.6  /  DBili  x   /  AST  32  /  ALT  22  /  AlkPhos  83                               10.4   11.92 )-----------( 194      ( 2019 02:10 )             31.1          PAST MEDICAL & SURGICAL HISTORY:  HTN (hypertension)  HLD (hyperlipidemia)  H/O impaired glucose tolerance  Coronary disease: sp  CABG       Physical Therapy Rec:   Home  [  ]   Home w/ PT  [  ]  Rehab  [  ]    Discussed with CT Surgery attending. Subjective: Pt states "I feel much better." Denies any CP, SOB, N/V and palpitations. No acute events overnight.     VITAL SIGNS  Telemetry: NSR 85 bpm  Vital Signs Last 24 Hrs  T(C): 36.7 (19 @ 12:15), Max: 36.9 (10-31-19 @ 20:00)  T(F): 98 (19 @ 12:15), Max: 98.5 (19 @ 00:00)  HR: 89 (19 12:15) (77 - 168)  BP: 104/70 (19 @ 12:15) (104/70 - 178/101)  RR: 18 (19 12:15) (15 - 29)  SpO2: 98% (19 12:15) (93% - 100%)            10-31 @ 07:01  -   @ 07:00  --------------------------------------------------------  IN: 1930 mL / OUT: 890 mL / NET: 1040 mL     @ 07:  -   @ 13:17  --------------------------------------------------------  IN: 130 mL / OUT: 1820 mL / NET: -1690 mL    Daily Weight in k.8 (2019 07:42)    Bilirubin Total, Serum: 0.6 mg/dL ( @ 02:10)    CAPILLARY BLOOD GLUCOSE  POCT Blood Glucose.: 129 mg/dL (2019 12:19)  POCT Blood Glucose.: 151 mg/dL (2019 08:01)  POCT Blood Glucose.: 143 mg/dL (31 Oct 2019 21:51)  POCT Blood Glucose.: 130 mg/dL (31 Oct 2019 17:33)        MEDICATIONS  aspirin enteric coated 81 milliGRAM(s) Oral daily  atorvastatin 40 milliGRAM(s) Oral at bedtime  chlorhexidine 2% Cloths 1 Application(s) Topical <User Schedule>  dextrose 50% Injectable 50 milliLiter(s) IV Push every 15 minutes  dextrose 50% Injectable 25 milliLiter(s) IV Push every 15 minutes  heparin  Injectable 5000 Unit(s) SubCutaneous every 8 hours  insulin lispro (HumaLOG) corrective regimen sliding scale   SubCutaneous Before meals and at bedtime  meropenem  IVPB 1000 milliGRAM(s) IV Intermittent every 8 hours  metoprolol tartrate 50 milliGRAM(s) Oral two times a day  ondansetron Injectable 4 milliGRAM(s) IV Push once PRN  oxycodone    5 mG/acetaminophen 325 mG 2 Tablet(s) Oral every 4 hours PRN  polyethylene glycol 3350 17 Gram(s) Oral daily  sodium chloride 0.9% lock flush 3 milliLiter(s) IV Push every 8 hours  sodium chloride 0.9%. 1000 milliLiter(s) IV Continuous <Continuous>  vancomycin  IVPB 1000 milliGRAM(s) IV Intermittent every 12 hours    PHYSICAL EXAM  Neurology: A&Ox3, nonfocal, no gross deficits  CV : RRR, +S1S2  Sternum: Prevena dressing CDI over mid-sternum, FELI drain at right mid chest-CDI with serosanguinous fluid, FELI drain at left mid-chest-CDI with serosanguinous fluid  Lungs: Respirations non-labored, mildly diminished in bases, mild expiratory wheezes, on 3L O2 via NC  Abdomen: Soft, NT/ND, +BSx4Q, last BM on , (-)N/V/D  : Voiding without difficulty, bladder non-distended  Extremities: (-) B/L LE edema, negative calf tenderness, +PP , R SVG incision-CDI    LABS      136  |  106  |  16  ----------------------------<  119<H>  4.4   |  22  |  0.76    Ca    7.8<L>      2019 02:10  Phos  2.6     -  Mg     2.0         TPro  5.2<L>  /  Alb  2.3<L>  /  TBili  0.6  /  DBili  x   /  AST  32  /  ALT  22  /  AlkPhos  83                               10.4   11.92 )-----------( 194      ( 2019 02:10 )             31.1          PAST MEDICAL & SURGICAL HISTORY:  HTN (hypertension)  HLD (hyperlipidemia)  H/O impaired glucose tolerance  Coronary disease: sp  CABG     Physical Therapy Rec:   Home  [ X ]   Home w/ PT  [  ]  Rehab  [  ]    Discussed with CT Surgery attending.

## 2019-11-01 NOTE — DIETITIAN INITIAL EVALUATION ADULT. - SIGNS/SYMPTOMS
Recent CABG with sternal wound debridement and infection 7.5% Wt loss x2 weeks, mild fat and muscle wasting, poor PO intake x2 weeks

## 2019-11-01 NOTE — DIETITIAN INITIAL EVALUATION ADULT. - PHYSICAL APPEARANCE
Nutrition focused physical exam: mild muscle wasting at deltoids and calfs. Mild fat wasting at triceps./well nourished

## 2019-11-01 NOTE — DIETITIAN INITIAL EVALUATION ADULT. - PROBLEM SELECTOR PLAN 1
sternal wound    drainage  s/p CABG  ID consult  follow up Blood cx and wound cx  Vanco x 1 stat  Non con Chest CT

## 2019-11-01 NOTE — PROGRESS NOTE ADULT - SUBJECTIVE AND OBJECTIVE BOX
CHEVY AMARAL  MRN-[id number]    Patient is a 69y old  Male who presents with a chief complaint of Sternal wound drainage (2019 07:29)    HPI:  69 yr old male with a history of coronary artery disease s/p CABG in 2017, hypertension, hyperlipidemia was found to have drainage from sternal wound site and is s/p sternal wound drainage and muscle flap placement. Per history he was a direct admit from CTS office with complaints of cloudy distal drainage  from sternal wound x 5 days with associated chills and no fevers.  He is sp CABG x 3 10/17 19 with uncomplicated post op course.  His history includes HTN, NIDDM and remote cigarette smoking. He has no complaints.     PAST MEDICAL & SURGICAL HISTORY:  HTN (hypertension)  HLD (hyperlipidemia)  H/O impaired glucose tolerance  Coronary disease: sp  CABG    FAMILY HISTORY:  No pertinent family history in first degree relatives    Social History:  Denies illicit drug use or frequent alcohol consumption. Endorses prior smoking.     Allergies  No Known Allergies    MEDICATIONS  (STANDING):  aspirin enteric coated 81 milliGRAM(s) Oral daily  atorvastatin 40 milliGRAM(s) Oral at bedtime  chlorhexidine 2% Cloths 1 Application(s) Topical <User Schedule>  dextrose 50% Injectable 50 milliLiter(s) IV Push every 15 minutes  dextrose 50% Injectable 25 milliLiter(s) IV Push every 15 minutes  heparin  Injectable 5000 Unit(s) SubCutaneous every 8 hours  insulin lispro (HumaLOG) corrective regimen sliding scale   SubCutaneous Before meals and at bedtime  meropenem  IVPB 1000 milliGRAM(s) IV Intermittent every 8 hours  metoprolol tartrate 50 milliGRAM(s) Oral two times a day  polyethylene glycol 3350 17 Gram(s) Oral daily  sodium chloride 0.9% lock flush 3 milliLiter(s) IV Push every 8 hours  sodium chloride 0.9%. 1000 milliLiter(s) (10 mL/Hr) IV Continuous <Continuous>  vancomycin  IVPB 1000 milliGRAM(s) IV Intermittent every 12 hours    MEDICATIONS  (PRN):  ondansetron Injectable 4 milliGRAM(s) IV Push once PRN Nausea and/or Vomiting  oxycodone    5 mG/acetaminophen 325 mG 2 Tablet(s) Oral every 4 hours PRN Moderate Pain (4 - 6)      Review of Systems:  Constitutional:  Negative for weight change, fever, malaise  HEENT:  Negative for sinus pain, hoarseness, sore throat, dysphagia, vision changes  Cardiovascular:  Negative for chest pain, palpitations, dizziness  Respiratory:  Negative for cough, wheezing, dyspnea  Gastrointestinal:  Negative for nausea, vomiting, diarrhea, melena  Musculoskeletal:  Negative for pain, swelling, stiffness   Neuro:  Negative for weakness, numbness, headache  Psych:  Negative for anxiety, depression  Endocrine:  Negative for polyuria, polydipsia, temperature Intolerance    All other systems negative unless otherwise stated    ICU Vital Signs Last 24 Hrs  T(C): 36.3 (2019 08:00), Max: 36.9 (31 Oct 2019 12:00)  T(F): 97.3 (2019 08:00), Max: 98.5 (2019 00:00)  HR: 83 (2019 09:) (77 - 168)  BP: 108/66 (2019 09:00) (108/66 - 178/101)  BP(mean): 82 (2019 09:) (82 - 131)  ABP: --  ABP(mean): --  RR: 19 (2019 09:00) (15 - 28)  SpO2: 100% (2019 09:00) (93% - 100%)    Daily     Daily Weight in k.8 (2019 07:42)  I&O's Summary    31 Oct 2019 07:  -  2019 07:00  --------------------------------------------------------  IN: 1930 mL / OUT: 890 mL / NET: 1040 mL    2019 07:  -  2019 09:45  --------------------------------------------------------  IN: 110 mL / OUT: 150 mL / NET: -40 mL    Physical Exam:  Gen: Alert, no apparent distress  CV: Regular rate and rhythm no murmurs, rubs or gallops  Pulm: Decreased air entry at left base  GI: Abd is soft, non-tender and non-distended with +BS  Ext: No clubbing, cyanosis or edema  Neuro: A+Ox3, follows commands and moves all extremities    Labs:                          10.4   11.92 )-----------( 194      ( 2019 02:10 )             31.1           136  |  106  |  16  ----------------------------<  119<H>  4.4   |  22  |  0.76    Ca    7.8<L>      2019 02:10  Phos  2.6       Mg     2.0         TPro  5.2<L>  /  Alb  2.3<L>  /  TBili  0.6  /  DBili  x   /  AST  32  /  ALT  22  /  AlkPhos  83    PT/INR - ( 30 Oct 2019 19:32 )   PT: 14.7 sec;   INR: 1.27 ratio    PTT - ( 30 Oct 2019 19:32 )  PTT:27.3 sec    Assessment/Plan: 69 yr old male with a history of coronary artery disease s/p CABG in 2017, hypertension, hyperlipidemia was found to have drainage from sternal wound site and is s/p sternal wound drainage and muscle flap placement.     Neuro:   Pain control with Dilaudid / Tylenol IV                                           Cardiovascular:    Stable hemodynamics  Not on any pressors  On beta blocker - had run of afib overnight but converted with an Amio bolus  Continue hemodynamic monitoring    Respiratory:  Pt is comfortable on nasal cannula  S/p L pigtail for effusion    GI:  Tolerating diabetic diet  Continue bowel regimen, Protonix  Continue Zofran for nausea - PRN	          Renal:  Monitor I/Os and electrolytes    Hematologic / Oncology:  No signs of active bleeding but received 2 units PRBC for low H/H yesterday with appropriate response  HSQ for DVT ppl    Infectious disease:  All surgical incision / chest tube sites look clean  Vancomycin and Meropenem for sternal wound infection  Cultures pending    Endocrine:  Continue Accuchecks with coverage    All clinical, lab, hemodynamic and radiographic data were reviewed and the plan was discussed with CTICU team.     Xavier Maldonado MD

## 2019-11-01 NOTE — PROGRESS NOTE ADULT - SUBJECTIVE AND OBJECTIVE BOX
Patient is a 69y old  Male who presents with a chief complaint of Sternal wound drainage (01 Nov 2019 13:15)    Being followed by ID for        Interval history:  No other acute events      ROS:  No cough,SOB,CP  No N/V/D  No abd pain  No urinary complaints  No HA  No joint or limb pain  No other complaints    PAST MEDICAL & SURGICAL HISTORY:  HTN (hypertension)  HLD (hyperlipidemia)  H/O impaired glucose tolerance  Coronary disease: sp  CABG    Allergies    No Known Allergies    Intolerances      Antimicrobials:    meropenem  IVPB 1000 milliGRAM(s) IV Intermittent every 8 hours  vancomycin  IVPB 1000 milliGRAM(s) IV Intermittent every 12 hours    MEDICATIONS  (STANDING):  aspirin enteric coated 81 milliGRAM(s) Oral daily  atorvastatin 40 milliGRAM(s) Oral at bedtime  chlorhexidine 2% Cloths 1 Application(s) Topical <User Schedule>  dextrose 50% Injectable 50 milliLiter(s) IV Push every 15 minutes  dextrose 50% Injectable 25 milliLiter(s) IV Push every 15 minutes  heparin  Injectable 5000 Unit(s) SubCutaneous every 8 hours  insulin lispro (HumaLOG) corrective regimen sliding scale   SubCutaneous Before meals and at bedtime  meropenem  IVPB 1000 milliGRAM(s) IV Intermittent every 8 hours  metoprolol tartrate 50 milliGRAM(s) Oral two times a day  polyethylene glycol 3350 17 Gram(s) Oral daily  sodium chloride 0.9% lock flush 3 milliLiter(s) IV Push every 8 hours  sodium chloride 0.9%. 1000 milliLiter(s) (10 mL/Hr) IV Continuous <Continuous>  vancomycin  IVPB 1000 milliGRAM(s) IV Intermittent every 12 hours      Vital Signs Last 24 Hrs  T(C): 36.7 (11-01-19 @ 12:15), Max: 36.9 (10-31-19 @ 20:00)  T(F): 98 (11-01-19 @ 12:15), Max: 98.5 (11-01-19 @ 00:00)  HR: 89 (11-01-19 @ 12:15) (77 - 168)  BP: 104/70 (11-01-19 @ 12:15) (104/70 - 178/101)  BP(mean): 83 (11-01-19 @ 10:00) (82 - 131)  RR: 18 (11-01-19 @ 12:15) (15 - 29)  SpO2: 98% (11-01-19 @ 12:15) (93% - 100%)    Physical Exam:    Constitutional well preserved,comfortable,pleasant    HEENT PERRLA EOMI,No pallor or icterus    No oral exudate or erythema    Neck supple no JVD or LN    Chest Good AE,CTA    CVS RRR S1 S2 WNl No murmur or rub or gallop    Abd soft BS normal No tenderness no masses    Ext No cyanosis clubbing or edema    IV site no erythema tenderness or discharge    Joints no swelling or LOM    CNS AAO X 3 no focal    Lab Data:                          10.4   11.92 )-----------( 194      ( 01 Nov 2019 02:10 )             31.1       11-01    136  |  106  |  16  ----------------------------<  119<H>  4.4   |  22  |  0.76    Ca    7.8<L>      01 Nov 2019 02:10  Phos  2.6     11-01  Mg     2.0     11-01    TPro  5.2<L>  /  Alb  2.3<L>  /  TBili  0.6  /  DBili  x   /  AST  32  /  ALT  22  /  AlkPhos  83  11-01          .Body Fluid deep culture chest  10-31-19   Testing in progress  --  --      .Tissue deep tissue culture chest  10-31-19   Testing in progress  --    No polymorphonuclear cells seen  No organisms seen      .Body Fluid Pericardial Fluid  10-31-19   No growth  --    polymorphonuclear leukocytes seen  No organisms seen  by cytocentrifuge      .Body Fluid superficial culture  10-31-19   Testing in progress  --    No polymorphonuclear cells seen  No organisms seen  by cytocentrifuge      Skin  10-28-19   No growth  --  --      .Blood  10-28-19   No growth to date.  --  --        Vancomycin Level, Trough: 8.4 ug/mL (11-01-19 @ 05:11)      WBC Count: 11.92 (11-01-19 @ 02:10)  WBC Count: 10.09 (10-31-19 @ 09:13)  WBC Count: 8.42 (10-31-19 @ 00:48)  WBC Count: 10.68 (10-30-19 @ 19:40)  WBC Count: 9.35 (10-30-19 @ 06:12)  WBC Count: 9.88 (10-29-19 @ 06:33)  WBC Count: 10.82 (10-28-19 @ 16:58) Patient is a 69y old  Male who presents with a chief complaint of Sternal wound drainage (01 Nov 2019 13:15)    Being followed by ID for        Interval history:  pt now on 2 small  denies complaints  OR cultures still haven't grown  No other acute events      PAST MEDICAL & SURGICAL HISTORY:  HTN (hypertension)  HLD (hyperlipidemia)  H/O impaired glucose tolerance  Coronary disease: sp  CABG    Allergies    No Known Allergies    Intolerances      Antimicrobials:    meropenem  IVPB 1000 milliGRAM(s) IV Intermittent every 8 hours  vancomycin  IVPB 1000 milliGRAM(s) IV Intermittent every 12 hours    MEDICATIONS  (STANDING):  aspirin enteric coated 81 milliGRAM(s) Oral daily  atorvastatin 40 milliGRAM(s) Oral at bedtime  chlorhexidine 2% Cloths 1 Application(s) Topical <User Schedule>  dextrose 50% Injectable 50 milliLiter(s) IV Push every 15 minutes  dextrose 50% Injectable 25 milliLiter(s) IV Push every 15 minutes  heparin  Injectable 5000 Unit(s) SubCutaneous every 8 hours  insulin lispro (HumaLOG) corrective regimen sliding scale   SubCutaneous Before meals and at bedtime  meropenem  IVPB 1000 milliGRAM(s) IV Intermittent every 8 hours  metoprolol tartrate 50 milliGRAM(s) Oral two times a day  polyethylene glycol 3350 17 Gram(s) Oral daily  sodium chloride 0.9% lock flush 3 milliLiter(s) IV Push every 8 hours  sodium chloride 0.9%. 1000 milliLiter(s) (10 mL/Hr) IV Continuous <Continuous>  vancomycin  IVPB 1000 milliGRAM(s) IV Intermittent every 12 hours      Vital Signs Last 24 Hrs  T(C): 36.7 (11-01-19 @ 12:15), Max: 36.9 (10-31-19 @ 20:00)  T(F): 98 (11-01-19 @ 12:15), Max: 98.5 (11-01-19 @ 00:00)  HR: 89 (11-01-19 @ 12:15) (77 - 168)  BP: 104/70 (11-01-19 @ 12:15) (104/70 - 178/101)  BP(mean): 83 (11-01-19 @ 10:00) (82 - 131)  RR: 18 (11-01-19 @ 12:15) (15 - 29)  SpO2: 98% (11-01-19 @ 12:15) (93% - 100%)    Physical Exam:    Constitutional well preserved,comfortable,pleasant    HEENT PERRLA EOMI,No pallor or icterus    No oral exudate or erythema    Neck supple no JVD or LN    Chest Good AE,CTA    CVS RRR S1 S2 WNl    Abd soft BS normal No tenderness    Ext  trace edema    IV site no erythema tenderness or discharge    Joints no swelling or LOM    CNS AAO X 3 no focal    Lab Data:                          10.4   11.92 )-----------( 194      ( 01 Nov 2019 02:10 )             31.1       11-01    136  |  106  |  16  ----------------------------<  119<H>  4.4   |  22  |  0.76    Ca    7.8<L>      01 Nov 2019 02:10  Phos  2.6     11-01  Mg     2.0     11-01    TPro  5.2<L>  /  Alb  2.3<L>  /  TBili  0.6  /  DBili  x   /  AST  32  /  ALT  22  /  AlkPhos  83  11-01        .Body Fluid deep culture chest  10-31-19   Testing in progress  --  --      .Tissue deep tissue culture chest  10-31-19   Testing in progress  --    No polymorphonuclear cells seen  No organisms seen      .Body Fluid Pericardial Fluid  10-31-19   No growth  --    polymorphonuclear leukocytes seen  No organisms seen  by cytocentrifuge      .Body Fluid superficial culture  10-31-19   Testing in progress  --    No polymorphonuclear cells seen  No organisms seen  by cytocentrifuge      Skin  10-28-19   No growth  --  --      .Blood  10-28-19   No growth to date.  --  --        Vancomycin Level, Trough: 8.4 ug/mL (11-01-19 @ 05:11)      WBC Count: 11.92 (11-01-19 @ 02:10)  WBC Count: 10.09 (10-31-19 @ 09:13)  WBC Count: 8.42 (10-31-19 @ 00:48)  WBC Count: 10.68 (10-30-19 @ 19:40)  WBC Count: 9.35 (10-30-19 @ 06:12)  WBC Count: 9.88 (10-29-19 @ 06:33)  WBC Count: 10.82 (10-28-19 @ 16:58)

## 2019-11-01 NOTE — PROGRESS NOTE ADULT - SUBJECTIVE AND OBJECTIVE BOX
S: Patient seen and examined, sitting up in chair with no distress. No complaints, doing well.    O: Vital Signs  T(C): 36.2 (11-01 @ 04:00), Max: 36.9 (10-31 @ 08:00)  HR: 79 (11-01 @ 07:00) (77 - 168)  BP: 117/75 (11-01 @ 07:00) (113/68 - 178/101)  RR: 21 (11-01 @ 07:00) (15 - 27)  SpO2: 96% (11-01 @ 07:00) (93% - 100%)  10-31-19 @ 07:01  -  11-01-19 @ 07:00  --------------------------------------------------------  IN: 1830 mL / OUT: 890 mL / NET: 940 mL      Chest: prevena vac in place holding suction  Extremities: R arm in sling                        10.4   11.92 )-----------( 194      ( 01 Nov 2019 02:10 )             31.1   11-01    136  |  106  |  16  ----------------------------<  119<H>  4.4   |  22  |  0.76    Ca    7.8<L>      01 Nov 2019 02:10  Phos  2.6     11-01  Mg     2.0     11-01    TPro  5.2<L>  /  Alb  2.3<L>  /  TBili  0.6  /  DBili  x   /  AST  32  /  ALT  22  /  AlkPhos  83  11-01

## 2019-11-01 NOTE — PROCEDURE NOTE - NSPROCDETAILS_GEN_ALL_CORE
Seldinger technique Seldinger technique/dressing applied/secured in place/sterile dressing applied/supine position/Trendelenburg position/percutaneous/thoracostomy tube placed percutaneously/ultrasound assessment of fluid (location)

## 2019-11-01 NOTE — PROGRESS NOTE ADULT - ASSESSMENT
69 yr old male direct admit from CTS office with complaints of MT  cloudy distal drainage  from sternal wound x 5 days with associated chills and no fevers.  He is sp CABG x 3 10/17 19 with uncomplicated post op course.  His history includes HTN, NIDDM and remote cigarette smoking.  Mr Orpoeza is admitted for wound culture,  blood culture and ID consult. (28 Oct 2019 15:42)  pt notes that discharge started last Wednesday 10/23/ When the drainage didn't improve or slow down, they called the cardiothoracic office today and was told to come in  Patient coughs, but unable to bring up phlegm  pt denies trauma to chest  pt terrie left shoulder pain since the time of surgery    Pt s/p purulent drainage from sternum, now S/P Sternal debridement with removal of sternotomy wires (X6) with irrigation. OR cultures obtained. Pectoralis muscle flap on the R with pec advancement on the L. Primary closure  Now on vancomycin and meropenem  await cultures   Check vancomycin levels    await routine, fungal and AFB cultures  Curious that gram stain was negative  follow vancomycin level    ID service will be covering over the weekend. Please call for acute issues or questions. [phone]

## 2019-11-01 NOTE — PROGRESS NOTE ADULT - PROBLEM SELECTOR PLAN 1
-Sternal wound debridement on 10/30 with pectoralis muscle flap-->FELI drains on R and L   -Prevena dressing in place to mid-sternum  -f/u cultures-->ID following, continuing Vancomycin 1 Gram IV Q12 and Meropenum 1 Gram IV Q8 hours  -Encourage incentive spirometry  -Activity as tolerated  -Discharge planning: home when medically stable

## 2019-11-01 NOTE — PROGRESS NOTE ADULT - PROBLEM SELECTOR PLAN 2
-Continue ASA 81mg, Lopressor 50mg BID, Atorvastatin 40mg QD  -Incentive spirometry  -activity as tolerated

## 2019-11-01 NOTE — DIETITIAN INITIAL EVALUATION ADULT. - ADD RECOMMEND
1. Provide food preferences as requested by Pt/family within diet restrictions  2. Encourage PO intake during meal times 3. Reviewed menu ordering procedures 4. Reviewed increased nutrient needs for wound healing 5. Malnutrition alert in chart. 6. add MVI for wound healing 7. Increase Glucerna from 1 to 2 daily to supplement intake and promote healing

## 2019-11-01 NOTE — PROCEDURE NOTE - NSPOSTCAREGUIDE_GEN_A_CORE
Verbal/written post procedure instructions were given to patient/caregiver
Verbal/written post procedure instructions were given to patient/caregiver/Care for catheter as per unit/ICU protocols

## 2019-11-01 NOTE — DIETITIAN INITIAL EVALUATION ADULT. - OTHER INFO
Pt seen for LOS in CTU. Pt reports s/p  CABG, his appetite was low x 2 weeks due to lack of appetite.   Pt reports UBW to be 178lbs, on admission Pt was 165lbs indicating a 13lbs weight loss 2x weeks. At home, was eating 2 egg whites, toast and tea for breakfast. Fish and sweet potato. Dinner: often was skipping. Drinking water. avoids sweets, no red meat and does not eat pork. Pt dislikes pieces of chicken but will have chicken if cut up in soup.   Pt states appetite has been fair since readmission. Pt willing to try Glucerna x1 daily to help supplement PO intake. RD also collected food preferences. PO intake encouraged to promote wound healing. Pt denies GI distress, chewing/swallowing difficulty, micronutrient supplements. NKFA Pt seen for LOS in CTU. Pt reports s/p  CABG, his appetite was low x 2 weeks due to lack of appetite.   Pt reports UBW to be 178lbs, on admission Pt was 165lbs indicating a 13lbs weight loss 2x weeks. At home, was eating 2 egg whites, toast and tea for breakfast. Fish and sweet potato. Dinner: often was skipping. Drinking water. avoids sweets, no red meat and does not eat pork. Pt dislikes pieces of chicken but will have chicken if cut up in soup. Pt doesn't monitor BG levels often, Hgba1c is 6.2% indicating good control.   Pt states appetite has been fair since readmission. Pt willing to try Glucerna x1 daily to help supplement PO intake. RD also collected food preferences. PO intake encouraged to promote wound healing. Pt denies GI distress, chewing/swallowing difficulty, micronutrient supplements. NKFA

## 2019-11-01 NOTE — CHART NOTE - NSCHARTNOTEFT_GEN_A_CORE
Upon Nutritional Assessment by the Registered Dietitian your patient was determined to meet criteria / has evidence of the following diagnosis/diagnoses:          [ ]  Mild Protein Calorie Malnutrition        [ ]  Moderate Protein Calorie Malnutrition        [X ] Severe acute Protein Calorie Malnutrition        [ ] Unspecified Protein Calorie Malnutrition        [ ] Underweight / BMI <19        [ ] Morbid Obesity / BMI > 40      Findings as based on:  [ X] Comprehensive nutrition assessment   [X ] Nutrition Focused Physical Exam  [ X] Other:   7.5% wt loss x2 weeks, mild fat and muscle wasting, decline in PO intake x2 weeks    Nutrition Plan/Recommendations:      Increase Glucerna from 1 to 2x daily.   Add Luis active x2 daily  Add Multivitamin for additional supplementation.     PROVIDER Section:     By signing this assessment you are acknowledging and agree with the diagnosis/diagnoses assigned by the Registered Dietitian    Comments:

## 2019-11-01 NOTE — PROCEDURE NOTE - NSINDICATIONS_GEN_A_CORE
pleural effusion
hemodynamic monitoring/hypertonic/irritant infusion/venous access/volume resuscitation

## 2019-11-02 LAB
ANION GAP SERPL CALC-SCNC: 8 MMOL/L — SIGNIFICANT CHANGE UP (ref 5–17)
BUN SERPL-MCNC: 20 MG/DL — SIGNIFICANT CHANGE UP (ref 7–23)
CALCIUM SERPL-MCNC: 8.4 MG/DL — SIGNIFICANT CHANGE UP (ref 8.4–10.5)
CHLORIDE SERPL-SCNC: 104 MMOL/L — SIGNIFICANT CHANGE UP (ref 96–108)
CO2 SERPL-SCNC: 24 MMOL/L — SIGNIFICANT CHANGE UP (ref 22–31)
CREAT SERPL-MCNC: 0.76 MG/DL — SIGNIFICANT CHANGE UP (ref 0.5–1.3)
CULTURE RESULTS: SIGNIFICANT CHANGE UP
CULTURE RESULTS: SIGNIFICANT CHANGE UP
GLUCOSE BLDC GLUCOMTR-MCNC: 104 MG/DL — HIGH (ref 70–99)
GLUCOSE BLDC GLUCOMTR-MCNC: 118 MG/DL — HIGH (ref 70–99)
GLUCOSE BLDC GLUCOMTR-MCNC: 123 MG/DL — HIGH (ref 70–99)
GLUCOSE BLDC GLUCOMTR-MCNC: 129 MG/DL — HIGH (ref 70–99)
GLUCOSE SERPL-MCNC: 110 MG/DL — HIGH (ref 70–99)
HCT VFR BLD CALC: 31.8 % — LOW (ref 39–50)
HGB BLD-MCNC: 10.4 G/DL — LOW (ref 13–17)
INR BLD: 1.3 RATIO — HIGH (ref 0.88–1.16)
MCHC RBC-ENTMCNC: 28.5 PG — SIGNIFICANT CHANGE UP (ref 27–34)
MCHC RBC-ENTMCNC: 32.7 GM/DL — SIGNIFICANT CHANGE UP (ref 32–36)
MCV RBC AUTO: 87.1 FL — SIGNIFICANT CHANGE UP (ref 80–100)
NRBC # BLD: 0 /100 WBCS — SIGNIFICANT CHANGE UP (ref 0–0)
PLATELET # BLD AUTO: 186 K/UL — SIGNIFICANT CHANGE UP (ref 150–400)
POTASSIUM SERPL-MCNC: 4.2 MMOL/L — SIGNIFICANT CHANGE UP (ref 3.5–5.3)
POTASSIUM SERPL-SCNC: 4.2 MMOL/L — SIGNIFICANT CHANGE UP (ref 3.5–5.3)
PROTHROM AB SERPL-ACNC: 14.9 SEC — HIGH (ref 10–12.9)
RBC # BLD: 3.65 M/UL — LOW (ref 4.2–5.8)
RBC # FLD: 15 % — HIGH (ref 10.3–14.5)
SODIUM SERPL-SCNC: 136 MMOL/L — SIGNIFICANT CHANGE UP (ref 135–145)
SPECIMEN SOURCE: SIGNIFICANT CHANGE UP
SPECIMEN SOURCE: SIGNIFICANT CHANGE UP
VANCOMYCIN TROUGH SERPL-MCNC: 15.3 UG/ML — SIGNIFICANT CHANGE UP (ref 10–20)
WBC # BLD: 10.61 K/UL — HIGH (ref 3.8–10.5)
WBC # FLD AUTO: 10.61 K/UL — HIGH (ref 3.8–10.5)

## 2019-11-02 PROCEDURE — 71045 X-RAY EXAM CHEST 1 VIEW: CPT | Mod: 26

## 2019-11-02 RX ORDER — CHLORHEXIDINE GLUCONATE 213 G/1000ML
1 SOLUTION TOPICAL
Refills: 0 | Status: DISCONTINUED | OUTPATIENT
Start: 2019-11-02 | End: 2019-11-06

## 2019-11-02 RX ORDER — SODIUM CHLORIDE 9 MG/ML
10 INJECTION INTRAMUSCULAR; INTRAVENOUS; SUBCUTANEOUS
Refills: 0 | Status: DISCONTINUED | OUTPATIENT
Start: 2019-11-02 | End: 2019-11-06

## 2019-11-02 RX ADMIN — Medication 250 MILLIGRAM(S): at 06:49

## 2019-11-02 RX ADMIN — OXYCODONE AND ACETAMINOPHEN 2 TABLET(S): 5; 325 TABLET ORAL at 15:22

## 2019-11-02 RX ADMIN — HEPARIN SODIUM 5000 UNIT(S): 5000 INJECTION INTRAVENOUS; SUBCUTANEOUS at 06:12

## 2019-11-02 RX ADMIN — OXYCODONE AND ACETAMINOPHEN 2 TABLET(S): 5; 325 TABLET ORAL at 09:05

## 2019-11-02 RX ADMIN — OXYCODONE AND ACETAMINOPHEN 2 TABLET(S): 5; 325 TABLET ORAL at 16:30

## 2019-11-02 RX ADMIN — MEROPENEM 100 MILLIGRAM(S): 1 INJECTION INTRAVENOUS at 15:16

## 2019-11-02 RX ADMIN — ATORVASTATIN CALCIUM 40 MILLIGRAM(S): 80 TABLET, FILM COATED ORAL at 22:28

## 2019-11-02 RX ADMIN — SODIUM CHLORIDE 3 MILLILITER(S): 9 INJECTION INTRAMUSCULAR; INTRAVENOUS; SUBCUTANEOUS at 06:12

## 2019-11-02 RX ADMIN — Medication 50 MILLIGRAM(S): at 18:24

## 2019-11-02 RX ADMIN — OXYCODONE AND ACETAMINOPHEN 2 TABLET(S): 5; 325 TABLET ORAL at 20:23

## 2019-11-02 RX ADMIN — Medication 250 MILLIGRAM(S): at 18:23

## 2019-11-02 RX ADMIN — SODIUM CHLORIDE 3 MILLILITER(S): 9 INJECTION INTRAMUSCULAR; INTRAVENOUS; SUBCUTANEOUS at 15:14

## 2019-11-02 RX ADMIN — HEPARIN SODIUM 5000 UNIT(S): 5000 INJECTION INTRAVENOUS; SUBCUTANEOUS at 22:28

## 2019-11-02 RX ADMIN — OXYCODONE AND ACETAMINOPHEN 2 TABLET(S): 5; 325 TABLET ORAL at 20:53

## 2019-11-02 RX ADMIN — Medication 81 MILLIGRAM(S): at 11:45

## 2019-11-02 RX ADMIN — MEROPENEM 100 MILLIGRAM(S): 1 INJECTION INTRAVENOUS at 22:28

## 2019-11-02 RX ADMIN — Medication 50 MILLIGRAM(S): at 06:11

## 2019-11-02 RX ADMIN — MEROPENEM 100 MILLIGRAM(S): 1 INJECTION INTRAVENOUS at 06:12

## 2019-11-02 RX ADMIN — CHLORHEXIDINE GLUCONATE 1 APPLICATION(S): 213 SOLUTION TOPICAL at 11:47

## 2019-11-02 RX ADMIN — HEPARIN SODIUM 5000 UNIT(S): 5000 INJECTION INTRAVENOUS; SUBCUTANEOUS at 15:16

## 2019-11-02 RX ADMIN — SODIUM CHLORIDE 3 MILLILITER(S): 9 INJECTION INTRAMUSCULAR; INTRAVENOUS; SUBCUTANEOUS at 22:28

## 2019-11-02 NOTE — PROGRESS NOTE ADULT - SUBJECTIVE AND OBJECTIVE BOX
S: Patient seen and examined, sitting up in bed, doing well with no complaints.    O: Vital Signs  T(C): 36.6 (11-02 @ 04:00), Max: 36.9 (11-01 @ 20:16)  HR: 93 (11-02 @ 06:10) (80 - 96)  BP: 146/89 (11-02 @ 06:10) (104/70 - 146/97)  RR: 18 (11-02 @ 04:00) (18 - 29)  SpO2: 95% (11-02 @ 04:00) (93% - 100%)  11-01-19 @ 07:01  -  11-02-19 @ 07:00  --------------------------------------------------------  IN: 1240 mL / OUT: 2665 mL / NET: -1425 mL      General: alert and oriented, NAD  Resp: airway patent, respirations unlabored  Chest: prevena vac in place holding suction, JPx2 w/ serosanguinous output  Extremities: no edema  Skin: warm, dry, appropriate color                          10.4   10.61 )-----------( 186      ( 02 Nov 2019 04:49 )             31.8   11-02    136  |  104  |  20  ----------------------------<  110<H>  4.2   |  24  |  0.76    Ca    8.4      02 Nov 2019 04:49  Phos  2.6     11-01  Mg     2.0     11-01    TPro  5.2<L>  /  Alb  2.3<L>  /  TBili  0.6  /  DBili  x   /  AST  32  /  ALT  22  /  AlkPhos  83  11-01

## 2019-11-02 NOTE — PROVIDER CONTACT NOTE (OTHER) - SITUATION
x 3 Sec
burst of Ventricular tachycardia x30 seconds HR up to 149
New finding- burst of PATs up to 190s for 8.2 seconds

## 2019-11-02 NOTE — PROGRESS NOTE ADULT - SUBJECTIVE AND OBJECTIVE BOX
Subjective ' Hello I am feeling well"     VITAL SIGNS    Telemetry:  NSR 91     Vital Signs Last 24 Hrs  T(C): 36.6 (19 @ 04:00), Max: 36.9 (19 @ 20:16)  T(F): 97.9 (19 @ 04:00), Max: 98.5 (19 @ 20:16)  HR: 93 (19 @ 06:10) (84 - 96)  BP: 146/89 (19 @ 06:10) (104/70 - 146/97)  RR: 18 (19 @ 04:00) (18 - 29)  SpO2: 95% (19 @ 04:00) (93% - 99%)            @ 07:01  -   @ 07:00  --------------------------------------------------------  IN: 1240 mL / OUT: 2665 mL / NET: -1425 mL     @ 08:01  -   @ 09:32  --------------------------------------------------------  IN: 360 mL / OUT: 0 mL / NET: 360 mL  Daily Weight in k.5 (2019 08:08)      POCT Blood Glucose.: 104 mg/dL (2019 07:55)  POCT Blood Glucose.: 124 mg/dL (2019 21:33)  POCT Blood Glucose.: 131 mg/dL (2019 16:50)  POCT Blood Glucose.: 129 mg/dL (2019 12:19  )MEDICATIONS  (STANDING):  aspirin enteric coated 81 milliGRAM(s) Oral daily  atorvastatin 40 milliGRAM(s) Oral at bedtime  chlorhexidine 2% Cloths 1 Application(s) Topical <User Schedule>  dextrose 50% Injectable 50 milliLiter(s) IV Push every 15 minutes  dextrose 50% Injectable 25 milliLiter(s) IV Push every 15 minutes  heparin  Injectable 5000 Unit(s) SubCutaneous every 8 hours  insulin lispro (HumaLOG) corrective regimen sliding scale   SubCutaneous Before meals and at bedtime  meropenem  IVPB 1000 milliGRAM(s) IV Intermittent every 8 hours  metoprolol tartrate 50 milliGRAM(s) Oral two times a day  polyethylene glycol 3350 17 Gram(s) Oral daily  sodium chloride 0.9% lock flush 3 milliLiter(s) IV Push every 8 hours  sodium chloride 0.9%. 1000 milliLiter(s) (10 mL/Hr) IV Continuous <Continuous>  vancomycin  IVPB 1000 milliGRAM(s) IV Intermittent every 12 hours    MEDICATIONS  (PRN):  ondansetron Injectable 4 milliGRAM(s) IV Push once PRN Nausea and/or Vomiting  oxycodone    5 mG/acetaminophen 325 mG 2 Tablet(s) Oral every 4 hours PRN Moderate Pain (4 - 6)            Drains:     RT FELI 40/85 Left FELI 80/160             R Pleural    ]            PHYSICAL EXAM  Neurology: alert and oriented x 3, nonfocal, no gross deficits    CV : S1 S2 RRR    Sternal Wound :  Provena to suction Patent   Lungs:B/l Breath sounds on room air    Abdomen: soft, nontender, nondistended, positive bowel sounds, last bowel movement     :   voids            Extremities:   b/lle warm well perfused + DP                                        Physical Therapy Rec:   Home  [ x ]   Home w/ PT  [  ]  Rehab  [  ]    Discussed with Cardiothoracic Team at AM rounds.

## 2019-11-02 NOTE — PROVIDER CONTACT NOTE (OTHER) - ASSESSMENT
/77  HR 89  O2 94%  Pt reports no chest pain or discomfort
VSS pt asymptomatic   /79  HR 99   no complaints of chest pain/ palpitations/ SOB
Pt asymptomatic, denies pain or distress.  /80  HR 94  O2 94% on RA  Temp 97.8 F

## 2019-11-02 NOTE — PROGRESS NOTE ADULT - ASSESSMENT
69M PMH CAD s/p CABG 2017, HTN, HLD, DM, hx s/p CABG x3 10/17/19 c/b drainage from sternal wound and is now s/p sternal wound debridement with R pectoralis flap and L pectoralis advancement 10/30/19. Chest tube placed yesterday by CTICU for large plaural effusion. Transferred from CTICU to floor, now recovering well.    - Continue prevena vac to chest  - Continue FELI drain care, monitor output  - No heavy lifting, no pushing or pulling actions w/both arms, no transferring w/b/l UE  - Rest of care per primary team  - Will follow    Plastic Surgery [phone]

## 2019-11-02 NOTE — PROGRESS NOTE ADULT - PROBLEM SELECTOR PLAN 1
-Sternal wound debridement on 10/30 with pectoralis muscle flap-->FELI drains on R and L   -Prevena dressing in place to mid-sternum  -f/u cultures-->ID following, continuing Vancomycin 1 Gram IV Q12 and Meropenum 1 Gram IV Q8 hours  -Encourage incentive spirometry  -Activity as tolerated  Follwed by Plastics  -Discharge planning: home when medically stable   F

## 2019-11-02 NOTE — PROVIDER CONTACT NOTE (OTHER) - ACTION/TREATMENT ORDERED:
Thoracic PA Noor notified and aware. Lopressor injectable 2.5 ordered and to be administered. Will continue to monitor.
Lopressor regimen in effect, continue to monitor
metoprolol 25mg Q8hr, will continue to monitor.

## 2019-11-02 NOTE — PROGRESS NOTE ADULT - ASSESSMENT
69 yr old male re-admit with MT distal drainage x 5 days s/p C3L 10/17/19. Readmitted 10/28 for Sternal wound infection.  Hospital Course:   10/29 Blood cultures x 2 --> NTD. Wound culture NTD. Plastics following.  ID following on Vanco 1 Gram Q12 and Meropenum 1 Gram IV Q8 hours.   10/30: NPO today for Sternal wound debridement and muscle flap with Dr. Shell and Dr. Zavaleta-->Sternal debridement with removal of sternotomy wires with irrigation. OR cultures obtained. Pectoralis muscle flap on the R with pec advancement on the L.  10/31: Recovering in CTU. Received 2 units of PRBCs for low H&H. Vanco and Meropenem for sternal wound.   11/1: Pt transferred to [address] [place] [address] in stable condition. Prevena dressing in place to mid-sternum. 2 FELI drains on right and left mid chest with serosanguinous fluid. R pigtail placed on unit this AM for pleural effusion-continues to drain. Continuing Meropenem and Vancomycin as per ID while awaiting culture results.   11/2 VSS pigtail d/c this am  PICC placement pending  for  abx   Discharge planning: home when medically stable.

## 2019-11-03 LAB
ANION GAP SERPL CALC-SCNC: 10 MMOL/L — SIGNIFICANT CHANGE UP (ref 5–17)
BUN SERPL-MCNC: 18 MG/DL — SIGNIFICANT CHANGE UP (ref 7–23)
CALCIUM SERPL-MCNC: 8.7 MG/DL — SIGNIFICANT CHANGE UP (ref 8.4–10.5)
CHLORIDE SERPL-SCNC: 101 MMOL/L — SIGNIFICANT CHANGE UP (ref 96–108)
CO2 SERPL-SCNC: 26 MMOL/L — SIGNIFICANT CHANGE UP (ref 22–31)
CREAT SERPL-MCNC: 0.69 MG/DL — SIGNIFICANT CHANGE UP (ref 0.5–1.3)
GLUCOSE BLDC GLUCOMTR-MCNC: 107 MG/DL — HIGH (ref 70–99)
GLUCOSE BLDC GLUCOMTR-MCNC: 109 MG/DL — HIGH (ref 70–99)
GLUCOSE BLDC GLUCOMTR-MCNC: 128 MG/DL — HIGH (ref 70–99)
GLUCOSE BLDC GLUCOMTR-MCNC: 89 MG/DL — SIGNIFICANT CHANGE UP (ref 70–99)
GLUCOSE SERPL-MCNC: 117 MG/DL — HIGH (ref 70–99)
HCT VFR BLD CALC: 33 % — LOW (ref 39–50)
HGB BLD-MCNC: 10.6 G/DL — LOW (ref 13–17)
MCHC RBC-ENTMCNC: 28 PG — SIGNIFICANT CHANGE UP (ref 27–34)
MCHC RBC-ENTMCNC: 32.1 GM/DL — SIGNIFICANT CHANGE UP (ref 32–36)
MCV RBC AUTO: 87.3 FL — SIGNIFICANT CHANGE UP (ref 80–100)
NRBC # BLD: 0 /100 WBCS — SIGNIFICANT CHANGE UP (ref 0–0)
PLATELET # BLD AUTO: 216 K/UL — SIGNIFICANT CHANGE UP (ref 150–400)
POTASSIUM SERPL-MCNC: 4.2 MMOL/L — SIGNIFICANT CHANGE UP (ref 3.5–5.3)
POTASSIUM SERPL-SCNC: 4.2 MMOL/L — SIGNIFICANT CHANGE UP (ref 3.5–5.3)
RBC # BLD: 3.78 M/UL — LOW (ref 4.2–5.8)
RBC # FLD: 15.1 % — HIGH (ref 10.3–14.5)
SODIUM SERPL-SCNC: 137 MMOL/L — SIGNIFICANT CHANGE UP (ref 135–145)
WBC # BLD: 9.37 K/UL — SIGNIFICANT CHANGE UP (ref 3.8–10.5)
WBC # FLD AUTO: 9.37 K/UL — SIGNIFICANT CHANGE UP (ref 3.8–10.5)

## 2019-11-03 PROCEDURE — 99231 SBSQ HOSP IP/OBS SF/LOW 25: CPT

## 2019-11-03 PROCEDURE — 99024 POSTOP FOLLOW-UP VISIT: CPT

## 2019-11-03 RX ADMIN — Medication 50 MILLIGRAM(S): at 18:08

## 2019-11-03 RX ADMIN — SODIUM CHLORIDE 3 MILLILITER(S): 9 INJECTION INTRAMUSCULAR; INTRAVENOUS; SUBCUTANEOUS at 13:16

## 2019-11-03 RX ADMIN — SODIUM CHLORIDE 3 MILLILITER(S): 9 INJECTION INTRAMUSCULAR; INTRAVENOUS; SUBCUTANEOUS at 06:14

## 2019-11-03 RX ADMIN — OXYCODONE AND ACETAMINOPHEN 2 TABLET(S): 5; 325 TABLET ORAL at 07:00

## 2019-11-03 RX ADMIN — OXYCODONE AND ACETAMINOPHEN 2 TABLET(S): 5; 325 TABLET ORAL at 18:43

## 2019-11-03 RX ADMIN — OXYCODONE AND ACETAMINOPHEN 2 TABLET(S): 5; 325 TABLET ORAL at 23:00

## 2019-11-03 RX ADMIN — Medication 50 MILLIGRAM(S): at 06:14

## 2019-11-03 RX ADMIN — MEROPENEM 100 MILLIGRAM(S): 1 INJECTION INTRAVENOUS at 22:28

## 2019-11-03 RX ADMIN — CHLORHEXIDINE GLUCONATE 1 APPLICATION(S): 213 SOLUTION TOPICAL at 11:36

## 2019-11-03 RX ADMIN — OXYCODONE AND ACETAMINOPHEN 2 TABLET(S): 5; 325 TABLET ORAL at 00:32

## 2019-11-03 RX ADMIN — MEROPENEM 100 MILLIGRAM(S): 1 INJECTION INTRAVENOUS at 06:14

## 2019-11-03 RX ADMIN — OXYCODONE AND ACETAMINOPHEN 2 TABLET(S): 5; 325 TABLET ORAL at 13:00

## 2019-11-03 RX ADMIN — HEPARIN SODIUM 5000 UNIT(S): 5000 INJECTION INTRAVENOUS; SUBCUTANEOUS at 06:14

## 2019-11-03 RX ADMIN — Medication 250 MILLIGRAM(S): at 18:08

## 2019-11-03 RX ADMIN — OXYCODONE AND ACETAMINOPHEN 2 TABLET(S): 5; 325 TABLET ORAL at 12:29

## 2019-11-03 RX ADMIN — MEROPENEM 100 MILLIGRAM(S): 1 INJECTION INTRAVENOUS at 13:38

## 2019-11-03 RX ADMIN — Medication 250 MILLIGRAM(S): at 06:45

## 2019-11-03 RX ADMIN — OXYCODONE AND ACETAMINOPHEN 2 TABLET(S): 5; 325 TABLET ORAL at 01:02

## 2019-11-03 RX ADMIN — HEPARIN SODIUM 5000 UNIT(S): 5000 INJECTION INTRAVENOUS; SUBCUTANEOUS at 13:38

## 2019-11-03 RX ADMIN — OXYCODONE AND ACETAMINOPHEN 2 TABLET(S): 5; 325 TABLET ORAL at 18:14

## 2019-11-03 RX ADMIN — OXYCODONE AND ACETAMINOPHEN 2 TABLET(S): 5; 325 TABLET ORAL at 06:19

## 2019-11-03 RX ADMIN — HEPARIN SODIUM 5000 UNIT(S): 5000 INJECTION INTRAVENOUS; SUBCUTANEOUS at 22:27

## 2019-11-03 RX ADMIN — POLYETHYLENE GLYCOL 3350 17 GRAM(S): 17 POWDER, FOR SOLUTION ORAL at 12:31

## 2019-11-03 RX ADMIN — OXYCODONE AND ACETAMINOPHEN 2 TABLET(S): 5; 325 TABLET ORAL at 22:28

## 2019-11-03 RX ADMIN — Medication 81 MILLIGRAM(S): at 12:29

## 2019-11-03 RX ADMIN — SODIUM CHLORIDE 3 MILLILITER(S): 9 INJECTION INTRAMUSCULAR; INTRAVENOUS; SUBCUTANEOUS at 22:30

## 2019-11-03 RX ADMIN — ATORVASTATIN CALCIUM 40 MILLIGRAM(S): 80 TABLET, FILM COATED ORAL at 22:27

## 2019-11-03 NOTE — PROGRESS NOTE ADULT - SUBJECTIVE AND OBJECTIVE BOX
VITAL SIGNS    Telemetry:  SR 80-90  Vital Signs Last 24 Hrs  T(C): 36.5 (19 @ 06:11), Max: 36.5 (19 @ 06:11)  T(F): 97.7 (19 @ 06:11), Max: 97.7 (19 @ 06:11)  HR: 92 (19 @ 06:11) (92 - 95)  BP: 148/86 (19 @ 06:11) (128/80 - 148/86)  RR: 18 (19 @ 06:11) (18 - 18)  SpO2: 93% (19 @ 06:11) (93% - 95%)             @ 08:01  -   @ 07:00  --------------------------------------------------------  IN: 1210 mL / OUT: 1180 mL / NET: 30 mL       Daily     Daily Weight in k.9 (2019 08:35)  Admit Wt: Drug Dosing Weight  Height (cm): 177.8 (30 Oct 2019 14:33)  Weight (kg): 74.9 (30 Oct 2019 14:33)  BMI (kg/m2): 23.7 (30 Oct 2019 14:33)  BSA (m2): 1.92 (30 Oct 2019 14:33)      CAPILLARY BLOOD GLUCOSE      POCT Blood Glucose.: 107 mg/dL (2019 08:15)  POCT Blood Glucose.: 118 mg/dL (2019 21:37)  POCT Blood Glucose.: 123 mg/dL (2019 16:55)  POCT Blood Glucose.: 129 mg/dL (2019 12:13)          MEDICATIONS  aspirin enteric coated 81 milliGRAM(s) Oral daily  atorvastatin 40 milliGRAM(s) Oral at bedtime  chlorhexidine 2% Cloths 1 Application(s) Topical <User Schedule>  chlorhexidine 4% Liquid 1 Application(s) Topical <User Schedule>  dextrose 50% Injectable 50 milliLiter(s) IV Push every 15 minutes  dextrose 50% Injectable 25 milliLiter(s) IV Push every 15 minutes  heparin  Injectable 5000 Unit(s) SubCutaneous every 8 hours  insulin lispro (HumaLOG) corrective regimen sliding scale   SubCutaneous Before meals and at bedtime  meropenem  IVPB 1000 milliGRAM(s) IV Intermittent every 8 hours  metoprolol tartrate 50 milliGRAM(s) Oral two times a day  ondansetron Injectable 4 milliGRAM(s) IV Push once PRN  oxycodone    5 mG/acetaminophen 325 mG 2 Tablet(s) Oral every 4 hours PRN  polyethylene glycol 3350 17 Gram(s) Oral daily  sodium chloride 0.9% lock flush 3 milliLiter(s) IV Push every 8 hours  sodium chloride 0.9% lock flush 10 milliLiter(s) IV Push every 1 hour PRN  sodium chloride 0.9%. 1000 milliLiter(s) IV Continuous <Continuous>  vancomycin  IVPB 1000 milliGRAM(s) IV Intermittent every 12 hours      >>> <<<  PHYSICAL EXAM  Subjective: NAD  Neurology: alert and oriented x 3, nonfocal, no gross deficits  CV : s1s2  Sternal Wound : right FELI-40  left FELI -70 prevena intact  Lungs:  Abdomen: soft, NT,ND, ( +)BM  :  voiding  Extremities:  -c/c/e     LABS  -    137  |  101  |  18  ----------------------------<  117<H>  4.2   |  26  |  0.69    Ca    8.7      2019 07:45                                   10.6   9.37  )-----------( 216      ( 2019 07:45 )             33.0          PT/INR - ( 2019 04:49 )   PT: 14.9 sec;   INR: 1.30 ratio                PAST MEDICAL & SURGICAL HISTORY:  HTN (hypertension)  HLD (hyperlipidemia)  H/O impaired glucose tolerance  Coronary disease: sp  CABG

## 2019-11-03 NOTE — PROGRESS NOTE ADULT - ASSESSMENT
69 yr old male re-admit with MT distal drainage x 5 days s/p C3L 10/17/19. Readmitted 10/28 for Sternal wound infection.  Hospital Course:   10/29 Blood cultures x 2 --> NTD. Wound culture NTD. Plastics following.  ID following on Vanco 1 Gram Q12 and Meropenum 1 Gram IV Q8 hours.   10/30: NPO today for Sternal wound debridement and muscle flap with Dr. Shell and Dr. Zavaleta-->Sternal debridement with removal of sternotomy wires with irrigation. OR cultures obtained. Pectoralis muscle flap on the R with pec advancement on the L.  10/31: Recovering in CTU. Received 2 units of PRBCs for low H&H. Vanco and Meropenem for sternal wound.   11/1: Pt transferred to [address] [place] [address] in stable condition. Prevena dressing in place to mid-sternum. 2 FELI drains on right and left mid chest with serosanguinous fluid. R pigtail placed on unit this AM for pleural effusion-continues to drain. Continuing Meropenem and Vancomycin as per ID while awaiting culture results.   11/2 VSS pigtail d/c this am  PICC placement pending  for  abx   11/3 PICC placed to RUE yesterday. Continue prevena vac to chest. Continue FELI drain care, monitor output. No heavy lifting, no pushing or pulling actions w/both arms, no transferring w/ b/l UE.   Anticipate Discharge home with IV abx and JPs

## 2019-11-03 NOTE — PROGRESS NOTE ADULT - SUBJECTIVE AND OBJECTIVE BOX
S: Patient seen and examined, sitting in chair this morning doing well.    O: Vital Signs  T(C): 36.5 (11-03-19 @ 06:11), Max: 36.5 (11-03-19 @ 06:11)  HR: 92 (11-03-19 @ 06:11) (92 - 95)  BP: 148/86 (11-03-19 @ 06:11) (128/80 - 148/86)  RR: 18 (11-03-19 @ 06:11) (18 - 18)  SpO2: 93% (11-03-19 @ 06:11) (93% - 95%)  Wt(kg): --  11-02 @ 08:01  -  11-03 @ 07:00  --------------------------------------------------------  IN:    IV PiggyBack: 350 mL    Oral Fluid: 560 mL    Solution: 300 mL  Total IN: 1210 mL    OUT:    Bulb: 120 mL    Bulb: 130 mL    Voided: 930 mL  Total OUT: 1180 mL    Total NET: 30 mL      General: alert and oriented, NAD  Resp: airway patent, respirations unlabored  Chest: prevena vac in place holding suction, JPx2 w/ serosanguinous output  Extremities: no edema  Skin: warm, dry, appropriate color

## 2019-11-03 NOTE — PROGRESS NOTE ADULT - ASSESSMENT
69 yr old male direct admit from CTS office with complaints of MT  cloudy distal drainage  from sternal wound x 5 days with associated chills and no fevers.  He is sp CABG x 3 10/17 19 with uncomplicated post op course.  His history includes HTN, NIDDM and remote cigarette smoking.  Mr Oropeza is admitted for wound culture,  blood culture and ID consult. (28 Oct 2019 15:42)  pt notes that discharge started last Wednesday 10/23/ When the drainage didn't improve or slow down, they called the cardiothoracic office today and was told to come in  Patient coughs, but unable to bring up phlegm  pt denies trauma to chest  pt terrie left shoulder pain since the time of surgery    Pt s/p purulent drainage from sternum, now S/P Sternal debridement with removal of sternotomy wires (X6) with irrigation. OR cultures obtained. Pectoralis muscle flap on the R with pec advancement on the L. Primary closure  Now on vancomycin and meropenem  await cultures   Check vancomycin levels    await routine, fungal and AFB cultures  Curious that gram stain was negative  follow vancomycin level- level from 11/1 was good

## 2019-11-03 NOTE — PROGRESS NOTE ADULT - SUBJECTIVE AND OBJECTIVE BOX
Patient is a 69y old  Male who presents with a chief complaint of Sternal wound drainage (03 Nov 2019 10:16)    Being followed by ID for        Interval history:  No other acute events      ROS:  No cough,SOB,CP  No N/V/D  No abd pain  No urinary complaints  No HA  No joint or limb pain  No other complaints    PAST MEDICAL & SURGICAL HISTORY:  HTN (hypertension)  HLD (hyperlipidemia)  H/O impaired glucose tolerance  Coronary disease: sp  CABG    Allergies    No Known Allergies    Intolerances      Antimicrobials:    meropenem  IVPB 1000 milliGRAM(s) IV Intermittent every 8 hours  vancomycin  IVPB 1000 milliGRAM(s) IV Intermittent every 12 hours    MEDICATIONS  (STANDING):  aspirin enteric coated 81 milliGRAM(s) Oral daily  atorvastatin 40 milliGRAM(s) Oral at bedtime  chlorhexidine 2% Cloths 1 Application(s) Topical <User Schedule>  chlorhexidine 4% Liquid 1 Application(s) Topical <User Schedule>  dextrose 50% Injectable 50 milliLiter(s) IV Push every 15 minutes  dextrose 50% Injectable 25 milliLiter(s) IV Push every 15 minutes  heparin  Injectable 5000 Unit(s) SubCutaneous every 8 hours  insulin lispro (HumaLOG) corrective regimen sliding scale   SubCutaneous Before meals and at bedtime  meropenem  IVPB 1000 milliGRAM(s) IV Intermittent every 8 hours  metoprolol tartrate 50 milliGRAM(s) Oral two times a day  polyethylene glycol 3350 17 Gram(s) Oral daily  sodium chloride 0.9% lock flush 3 milliLiter(s) IV Push every 8 hours  sodium chloride 0.9%. 1000 milliLiter(s) (10 mL/Hr) IV Continuous <Continuous>  vancomycin  IVPB 1000 milliGRAM(s) IV Intermittent every 12 hours      Vital Signs Last 24 Hrs  T(C): 36.5 (11-03-19 @ 06:11), Max: 36.5 (11-03-19 @ 06:11)  T(F): 97.7 (11-03-19 @ 06:11), Max: 97.7 (11-03-19 @ 06:11)  HR: 92 (11-03-19 @ 06:11) (92 - 95)  BP: 148/86 (11-03-19 @ 06:11) (128/80 - 148/86)  BP(mean): --  RR: 18 (11-03-19 @ 06:11) (18 - 18)  SpO2: 93% (11-03-19 @ 06:11) (93% - 95%)    Physical Exam:    Constitutional well preserved,comfortable,pleasant    HEENT PERRLA EOMI,No pallor or icterus    No oral exudate or erythema    Neck supple no JVD or LN    Chest Good AE,CTA    CVS RRR S1 S2 WNl No murmur or rub or gallop    Abd soft BS normal No tenderness no masses    Ext No cyanosis clubbing or edema    IV site no erythema tenderness or discharge    Joints no swelling or LOM    CNS AAO X 3 no focal    Lab Data:                          10.6   9.37  )-----------( 216      ( 03 Nov 2019 07:45 )             33.0       11-03    137  |  101  |  18  ----------------------------<  117<H>  4.2   |  26  |  0.69    Ca    8.7      03 Nov 2019 07:45        .Body Fluid deep culture chest  10-31-19   Testing in progress  --  --      .Tissue deep tissue culture chest  10-31-19   Testing in progress  --    No polymorphonuclear cells seen  No organisms seen      .Body Fluid Pericardial Fluid  10-31-19   No growth  --    polymorphonuclear leukocytes seen  No organisms seen  by cytocentrifuge      .Body Fluid superficial culture  10-31-19   Testing in progress  --    No polymorphonuclear cells seen  No organisms seen  by cytocentrifuge      Skin  10-28-19   No growth  --  --      .Blood  10-28-19   No growth at 5 days.  --  --        Vancomycin Level, Trough: 15.3 ug/mL (11-02-19 @ 04:49)      WBC Count: 9.37 (11-03-19 @ 07:45)  WBC Count: 10.61 (11-02-19 @ 04:49)  WBC Count: 11.92 (11-01-19 @ 02:10)  WBC Count: 10.09 (10-31-19 @ 09:13)  WBC Count: 8.42 (10-31-19 @ 00:48)  WBC Count: 10.68 (10-30-19 @ 19:40)  WBC Count: 9.35 (10-30-19 @ 06:12)  WBC Count: 9.88 (10-29-19 @ 06:33)  WBC Count: 10.82 (10-28-19 @ 16:58) Patient is a 69y old  Male who presents with a chief complaint of Sternal wound drainage (03 Nov 2019 10:16)    Being followed by ID for        Interval history:  pt feeling improved  ambulating well  No other acute events        PAST MEDICAL & SURGICAL HISTORY:  HTN (hypertension)  HLD (hyperlipidemia)  H/O impaired glucose tolerance  Coronary disease: sp  CABG    Allergies    No Known Allergies    Intolerances      Antimicrobials:    meropenem  IVPB 1000 milliGRAM(s) IV Intermittent every 8 hours  vancomycin  IVPB 1000 milliGRAM(s) IV Intermittent every 12 hours    MEDICATIONS  (STANDING):  aspirin enteric coated 81 milliGRAM(s) Oral daily  atorvastatin 40 milliGRAM(s) Oral at bedtime  chlorhexidine 2% Cloths 1 Application(s) Topical <User Schedule>  chlorhexidine 4% Liquid 1 Application(s) Topical <User Schedule>  dextrose 50% Injectable 50 milliLiter(s) IV Push every 15 minutes  dextrose 50% Injectable 25 milliLiter(s) IV Push every 15 minutes  heparin  Injectable 5000 Unit(s) SubCutaneous every 8 hours  insulin lispro (HumaLOG) corrective regimen sliding scale   SubCutaneous Before meals and at bedtime  meropenem  IVPB 1000 milliGRAM(s) IV Intermittent every 8 hours  metoprolol tartrate 50 milliGRAM(s) Oral two times a day  polyethylene glycol 3350 17 Gram(s) Oral daily  sodium chloride 0.9% lock flush 3 milliLiter(s) IV Push every 8 hours  sodium chloride 0.9%. 1000 milliLiter(s) (10 mL/Hr) IV Continuous <Continuous>  vancomycin  IVPB 1000 milliGRAM(s) IV Intermittent every 12 hours      Vital Signs Last 24 Hrs  T(C): 36.5 (11-03-19 @ 06:11), Max: 36.5 (11-03-19 @ 06:11)  T(F): 97.7 (11-03-19 @ 06:11), Max: 97.7 (11-03-19 @ 06:11)  HR: 92 (11-03-19 @ 06:11) (92 - 95)  BP: 148/86 (11-03-19 @ 06:11) (128/80 - 148/86)  BP(mean): --  RR: 18 (11-03-19 @ 06:11) (18 - 18)  SpO2: 93% (11-03-19 @ 06:11) (93% - 95%)    Physical Exam:    Constitutional well preserved,comfortable,pleasant    HEENT PERRLA EOMI,No pallor or icterus    No oral exudate or erythema    Neck supple no JVD or LN    Chest Good AE,CTA    CVS RRR S1 S2     sternal wound dressed    Abd soft BS normal No tenderness n    Ext No cyanosis clubbing or edema    IV site no erythema tenderness or discharge    Joints no swelling or LOM    CNS AAO X 3 no focal    Lab Data:                          10.6   9.37  )-----------( 216      ( 03 Nov 2019 07:45 )             33.0       11-03    137  |  101  |  18  ----------------------------<  117<H>  4.2   |  26  |  0.69    Ca    8.7      03 Nov 2019 07:45        .Body Fluid deep culture chest  10-31-19   Testing in progress  --  --      .Tissue deep tissue culture chest  10-31-19   Testing in progress  --    No polymorphonuclear cells seen  No organisms seen      .Body Fluid Pericardial Fluid  10-31-19   No growth  --    polymorphonuclear leukocytes seen  No organisms seen  by cytocentrifuge      .Body Fluid superficial culture  10-31-19   Testing in progress  --    No polymorphonuclear cells seen  No organisms seen  by cytocentrifuge      Skin  10-28-19   No growth  --  --      .Blood  10-28-19   No growth at 5 days.  --  --        Vancomycin Level, Trough: 15.3 ug/mL (11-02-19 @ 04:49)      WBC Count: 9.37 (11-03-19 @ 07:45)  WBC Count: 10.61 (11-02-19 @ 04:49)  WBC Count: 11.92 (11-01-19 @ 02:10)  WBC Count: 10.09 (10-31-19 @ 09:13)  WBC Count: 8.42 (10-31-19 @ 00:48)  WBC Count: 10.68 (10-30-19 @ 19:40)  WBC Count: 9.35 (10-30-19 @ 06:12)  WBC Count: 9.88 (10-29-19 @ 06:33)  WBC Count: 10.82 (10-28-19 @ 16:58)

## 2019-11-03 NOTE — PROGRESS NOTE ADULT - ASSESSMENT
69M PMH CAD s/p CABG 2017, HTN, HLD, DM, hx s/p CABG x3 10/17/19 c/b drainage from sternal wound and is now s/p sternal wound debridement with R pectoralis flap and L pectoralis advancement 10/30/19. Chest tube placed yesterday by CTICU for large plaural effusion. Recovering on floor.    - Continue prevena vac to chest  - Continue FELI drain care, monitor output  - No heavy lifting, no pushing or pulling actions w/both arms, no transferring w/b/l UE  - Rest of care per primary team  - Will follow    Plastic Surgery [phone]

## 2019-11-04 LAB
ALBUMIN SERPL ELPH-MCNC: 2.6 G/DL — LOW (ref 3.3–5)
ALP SERPL-CCNC: 106 U/L — SIGNIFICANT CHANGE UP (ref 40–120)
ALT FLD-CCNC: 26 U/L — SIGNIFICANT CHANGE UP (ref 10–45)
ANION GAP SERPL CALC-SCNC: 10 MMOL/L — SIGNIFICANT CHANGE UP (ref 5–17)
AST SERPL-CCNC: 37 U/L — SIGNIFICANT CHANGE UP (ref 10–40)
BILIRUB SERPL-MCNC: 0.5 MG/DL — SIGNIFICANT CHANGE UP (ref 0.2–1.2)
BUN SERPL-MCNC: 14 MG/DL — SIGNIFICANT CHANGE UP (ref 7–23)
CALCIUM SERPL-MCNC: 8.6 MG/DL — SIGNIFICANT CHANGE UP (ref 8.4–10.5)
CHLORIDE SERPL-SCNC: 101 MMOL/L — SIGNIFICANT CHANGE UP (ref 96–108)
CO2 SERPL-SCNC: 25 MMOL/L — SIGNIFICANT CHANGE UP (ref 22–31)
CREAT SERPL-MCNC: 0.7 MG/DL — SIGNIFICANT CHANGE UP (ref 0.5–1.3)
CULTURE RESULTS: SIGNIFICANT CHANGE UP
GLUCOSE BLDC GLUCOMTR-MCNC: 114 MG/DL — HIGH (ref 70–99)
GLUCOSE BLDC GLUCOMTR-MCNC: 114 MG/DL — HIGH (ref 70–99)
GLUCOSE BLDC GLUCOMTR-MCNC: 115 MG/DL — HIGH (ref 70–99)
GLUCOSE BLDC GLUCOMTR-MCNC: 96 MG/DL — SIGNIFICANT CHANGE UP (ref 70–99)
GLUCOSE SERPL-MCNC: 110 MG/DL — HIGH (ref 70–99)
HCT VFR BLD CALC: 33.3 % — LOW (ref 39–50)
HGB BLD-MCNC: 10.5 G/DL — LOW (ref 13–17)
INR BLD: 1.2 RATIO — HIGH (ref 0.88–1.16)
MCHC RBC-ENTMCNC: 27.7 PG — SIGNIFICANT CHANGE UP (ref 27–34)
MCHC RBC-ENTMCNC: 31.5 GM/DL — LOW (ref 32–36)
MCV RBC AUTO: 87.9 FL — SIGNIFICANT CHANGE UP (ref 80–100)
NRBC # BLD: 0 /100 WBCS — SIGNIFICANT CHANGE UP (ref 0–0)
PLATELET # BLD AUTO: 279 K/UL — SIGNIFICANT CHANGE UP (ref 150–400)
POTASSIUM SERPL-MCNC: 4.4 MMOL/L — SIGNIFICANT CHANGE UP (ref 3.5–5.3)
POTASSIUM SERPL-SCNC: 4.4 MMOL/L — SIGNIFICANT CHANGE UP (ref 3.5–5.3)
PROT SERPL-MCNC: 6 G/DL — SIGNIFICANT CHANGE UP (ref 6–8.3)
PROTHROM AB SERPL-ACNC: 13.8 SEC — HIGH (ref 10–12.9)
RBC # BLD: 3.79 M/UL — LOW (ref 4.2–5.8)
RBC # FLD: 14.9 % — HIGH (ref 10.3–14.5)
SODIUM SERPL-SCNC: 136 MMOL/L — SIGNIFICANT CHANGE UP (ref 135–145)
SPECIMEN SOURCE: SIGNIFICANT CHANGE UP
WBC # BLD: 8.8 K/UL — SIGNIFICANT CHANGE UP (ref 3.8–10.5)
WBC # FLD AUTO: 8.8 K/UL — SIGNIFICANT CHANGE UP (ref 3.8–10.5)

## 2019-11-04 PROCEDURE — 99232 SBSQ HOSP IP/OBS MODERATE 35: CPT

## 2019-11-04 RX ORDER — SENNA PLUS 8.6 MG/1
2 TABLET ORAL AT BEDTIME
Refills: 0 | Status: DISCONTINUED | OUTPATIENT
Start: 2019-11-04 | End: 2019-11-06

## 2019-11-04 RX ADMIN — OXYCODONE AND ACETAMINOPHEN 2 TABLET(S): 5; 325 TABLET ORAL at 20:52

## 2019-11-04 RX ADMIN — SODIUM CHLORIDE 3 MILLILITER(S): 9 INJECTION INTRAMUSCULAR; INTRAVENOUS; SUBCUTANEOUS at 21:14

## 2019-11-04 RX ADMIN — OXYCODONE AND ACETAMINOPHEN 2 TABLET(S): 5; 325 TABLET ORAL at 15:51

## 2019-11-04 RX ADMIN — POLYETHYLENE GLYCOL 3350 17 GRAM(S): 17 POWDER, FOR SOLUTION ORAL at 12:40

## 2019-11-04 RX ADMIN — SODIUM CHLORIDE 3 MILLILITER(S): 9 INJECTION INTRAMUSCULAR; INTRAVENOUS; SUBCUTANEOUS at 06:09

## 2019-11-04 RX ADMIN — ATORVASTATIN CALCIUM 40 MILLIGRAM(S): 80 TABLET, FILM COATED ORAL at 21:46

## 2019-11-04 RX ADMIN — Medication 81 MILLIGRAM(S): at 12:41

## 2019-11-04 RX ADMIN — OXYCODONE AND ACETAMINOPHEN 2 TABLET(S): 5; 325 TABLET ORAL at 04:05

## 2019-11-04 RX ADMIN — HEPARIN SODIUM 5000 UNIT(S): 5000 INJECTION INTRAVENOUS; SUBCUTANEOUS at 13:06

## 2019-11-04 RX ADMIN — MEROPENEM 100 MILLIGRAM(S): 1 INJECTION INTRAVENOUS at 21:46

## 2019-11-04 RX ADMIN — OXYCODONE AND ACETAMINOPHEN 2 TABLET(S): 5; 325 TABLET ORAL at 14:51

## 2019-11-04 RX ADMIN — Medication 250 MILLIGRAM(S): at 17:13

## 2019-11-04 RX ADMIN — Medication 250 MILLIGRAM(S): at 06:00

## 2019-11-04 RX ADMIN — MEROPENEM 100 MILLIGRAM(S): 1 INJECTION INTRAVENOUS at 05:59

## 2019-11-04 RX ADMIN — OXYCODONE AND ACETAMINOPHEN 2 TABLET(S): 5; 325 TABLET ORAL at 03:32

## 2019-11-04 RX ADMIN — MEROPENEM 100 MILLIGRAM(S): 1 INJECTION INTRAVENOUS at 13:06

## 2019-11-04 RX ADMIN — OXYCODONE AND ACETAMINOPHEN 2 TABLET(S): 5; 325 TABLET ORAL at 20:22

## 2019-11-04 RX ADMIN — OXYCODONE AND ACETAMINOPHEN 2 TABLET(S): 5; 325 TABLET ORAL at 10:35

## 2019-11-04 RX ADMIN — Medication 50 MILLIGRAM(S): at 17:13

## 2019-11-04 RX ADMIN — OXYCODONE AND ACETAMINOPHEN 2 TABLET(S): 5; 325 TABLET ORAL at 09:52

## 2019-11-04 RX ADMIN — HEPARIN SODIUM 5000 UNIT(S): 5000 INJECTION INTRAVENOUS; SUBCUTANEOUS at 21:46

## 2019-11-04 RX ADMIN — CHLORHEXIDINE GLUCONATE 1 APPLICATION(S): 213 SOLUTION TOPICAL at 09:59

## 2019-11-04 RX ADMIN — SODIUM CHLORIDE 3 MILLILITER(S): 9 INJECTION INTRAMUSCULAR; INTRAVENOUS; SUBCUTANEOUS at 14:01

## 2019-11-04 RX ADMIN — Medication 50 MILLIGRAM(S): at 05:59

## 2019-11-04 RX ADMIN — CHLORHEXIDINE GLUCONATE 1 APPLICATION(S): 213 SOLUTION TOPICAL at 09:58

## 2019-11-04 RX ADMIN — HEPARIN SODIUM 5000 UNIT(S): 5000 INJECTION INTRAVENOUS; SUBCUTANEOUS at 05:59

## 2019-11-04 NOTE — PROGRESS NOTE ADULT - SUBJECTIVE AND OBJECTIVE BOX
Patient is a 69y old  Male who presents with a chief complaint of Sternal wound drainage (04 Nov 2019 06:40)    Being followed by ID for        Interval history:  No other acute events      ROS:  No cough,SOB,CP  No N/V/D  No abd pain  No urinary complaints  No HA  No joint or limb pain  No other complaints    PAST MEDICAL & SURGICAL HISTORY:  HTN (hypertension)  HLD (hyperlipidemia)  H/O impaired glucose tolerance  Coronary disease: sp  CABG    Allergies    No Known Allergies    Intolerances    Antimicrobials:    meropenem  IVPB 1000 milliGRAM(s) IV Intermittent every 8 hours  vancomycin  IVPB 1000 milliGRAM(s) IV Intermittent every 12 hours    MEDICATIONS  (STANDING):  aspirin enteric coated 81 milliGRAM(s) Oral daily  atorvastatin 40 milliGRAM(s) Oral at bedtime  chlorhexidine 2% Cloths 1 Application(s) Topical <User Schedule>  chlorhexidine 4% Liquid 1 Application(s) Topical <User Schedule>  dextrose 50% Injectable 50 milliLiter(s) IV Push every 15 minutes  dextrose 50% Injectable 25 milliLiter(s) IV Push every 15 minutes  heparin  Injectable 5000 Unit(s) SubCutaneous every 8 hours  insulin lispro (HumaLOG) corrective regimen sliding scale   SubCutaneous Before meals and at bedtime  meropenem  IVPB 1000 milliGRAM(s) IV Intermittent every 8 hours  metoprolol tartrate 50 milliGRAM(s) Oral two times a day  polyethylene glycol 3350 17 Gram(s) Oral daily  sodium chloride 0.9% lock flush 3 milliLiter(s) IV Push every 8 hours  sodium chloride 0.9%. 1000 milliLiter(s) (10 mL/Hr) IV Continuous <Continuous>  vancomycin  IVPB 1000 milliGRAM(s) IV Intermittent every 12 hours      Vital Signs Last 24 Hrs  T(C): 36.9 (11-04-19 @ 05:00), Max: 36.9 (11-04-19 @ 05:00)  T(F): 98.4 (11-04-19 @ 05:00), Max: 98.4 (11-04-19 @ 05:00)  HR: 92 (11-04-19 @ 05:00) (89 - 94)  BP: 145/84 (11-04-19 @ 05:00) (127/74 - 166/92)  BP(mean): --  RR: 19 (11-04-19 @ 05:00) (18 - 20)  SpO2: 95% (11-04-19 @ 05:00) (95% - 100%)    Physical Exam:    Constitutional well preserved,comfortable,pleasant    HEENT PERRLA EOMI,No pallor or icterus    No oral exudate or erythema    Neck supple no JVD or LN    Chest Good AE,CTA    CVS RRR S1 S2 WNl No murmur or rub or gallop    Abd soft BS normal No tenderness no masses    Ext No cyanosis clubbing or edema    IV site no erythema tenderness or discharge    Joints no swelling or LOM    CNS AAO X 3 no focal    Lab Data:                          10.5   8.80  )-----------( 279      ( 04 Nov 2019 05:46 )             33.3       11-04    136  |  101  |  14  ----------------------------<  110<H>  4.4   |  25  |  0.70    Ca    8.6      04 Nov 2019 05:46    TPro  6.0  /  Alb  2.6<L>  /  TBili  0.5  /  DBili  x   /  AST  37  /  ALT  26  /  AlkPhos  106  11-04          .Body Fluid deep culture chest  10-31-19   Testing in progress  --  --      .Tissue deep tissue culture chest  10-31-19   Testing in progress  --    No polymorphonuclear cells seen  No organisms seen      .Body Fluid Pericardial Fluid  10-31-19   No growth  --    polymorphonuclear leukocytes seen  No organisms seen  by cytocentrifuge      .Body Fluid superficial culture  10-31-19   Testing in progress  --    No polymorphonuclear cells seen  No organisms seen  by cytocentrifuge      Skin  10-28-19   No growth  --  --      .Blood  10-28-19   No growth at 5 days.  --  --        WBC Count: 8.80 (11-04-19 @ 05:46)  WBC Count: 9.37 (11-03-19 @ 07:45)  WBC Count: 10.61 (11-02-19 @ 04:49)  WBC Count: 11.92 (11-01-19 @ 02:10)  WBC Count: 10.09 (10-31-19 @ 09:13)  WBC Count: 8.42 (10-31-19 @ 00:48)  WBC Count: 10.68 (10-30-19 @ 19:40)  WBC Count: 9.35 (10-30-19 @ 06:12)  WBC Count: 9.88 (10-29-19 @ 06:33)  WBC Count: 10.82 (10-28-19 @ 16:58) Patient is a 69y old  Male who presents with a chief complaint of Sternal wound drainage (04 Nov 2019 06:40)    Being followed by ID for        Interval history:  pt feeling much improved  cultures still have not grown  No other acute events        PAST MEDICAL & SURGICAL HISTORY:  HTN (hypertension)  HLD (hyperlipidemia)  H/O impaired glucose tolerance  Coronary disease: sp  CABG    Allergies    No Known Allergies    Intolerances    Antimicrobials:    meropenem  IVPB 1000 milliGRAM(s) IV Intermittent every 8 hours  vancomycin  IVPB 1000 milliGRAM(s) IV Intermittent every 12 hours    MEDICATIONS  (STANDING):  aspirin enteric coated 81 milliGRAM(s) Oral daily  atorvastatin 40 milliGRAM(s) Oral at bedtime  chlorhexidine 2% Cloths 1 Application(s) Topical <User Schedule>  chlorhexidine 4% Liquid 1 Application(s) Topical <User Schedule>  dextrose 50% Injectable 50 milliLiter(s) IV Push every 15 minutes  dextrose 50% Injectable 25 milliLiter(s) IV Push every 15 minutes  heparin  Injectable 5000 Unit(s) SubCutaneous every 8 hours  insulin lispro (HumaLOG) corrective regimen sliding scale   SubCutaneous Before meals and at bedtime  meropenem  IVPB 1000 milliGRAM(s) IV Intermittent every 8 hours  metoprolol tartrate 50 milliGRAM(s) Oral two times a day  polyethylene glycol 3350 17 Gram(s) Oral daily  sodium chloride 0.9% lock flush 3 milliLiter(s) IV Push every 8 hours  sodium chloride 0.9%. 1000 milliLiter(s) (10 mL/Hr) IV Continuous <Continuous>  vancomycin  IVPB 1000 milliGRAM(s) IV Intermittent every 12 hours      Vital Signs Last 24 Hrs  T(C): 36.9 (11-04-19 @ 05:00), Max: 36.9 (11-04-19 @ 05:00)  T(F): 98.4 (11-04-19 @ 05:00), Max: 98.4 (11-04-19 @ 05:00)  HR: 92 (11-04-19 @ 05:00) (89 - 94)  BP: 145/84 (11-04-19 @ 05:00) (127/74 - 166/92)  BP(mean): --  RR: 19 (11-04-19 @ 05:00) (18 - 20)  SpO2: 95% (11-04-19 @ 05:00) (95% - 100%)    Physical Exam:    Constitutional well preserved,comfortable,pleasant    HEENT PERRLA EOMI,No pallor or icterus    No oral exudate or erythema    Neck supple no JVD or LN    Chest Good AE,CTA    sternal wound dressed    CVS RRR S1 S2 WNl     Abd soft BS normal No tenderness     Ext No cyanosis clubbing or edema    IV site no erythema tenderness or discharge    Joints no swelling or LOM    CNS AAO X 3 no focal    Lab Data:                          10.5   8.80  )-----------( 279      ( 04 Nov 2019 05:46 )             33.3       11-04    136  |  101  |  14  ----------------------------<  110<H>  4.4   |  25  |  0.70    Ca    8.6      04 Nov 2019 05:46    TPro  6.0  /  Alb  2.6<L>  /  TBili  0.5  /  DBili  x   /  AST  37  /  ALT  26  /  AlkPhos  106  11-04          .Body Fluid deep culture chest  10-31-19   Testing in progress  --  --      .Tissue deep tissue culture chest  10-31-19   Testing in progress  --    No polymorphonuclear cells seen  No organisms seen      .Body Fluid Pericardial Fluid  10-31-19   No growth  --    polymorphonuclear leukocytes seen  No organisms seen  by cytocentrifuge      .Body Fluid superficial culture  10-31-19   Testing in progress  --    No polymorphonuclear cells seen  No organisms seen  by cytocentrifuge      Skin  10-28-19   No growth  --  --      .Blood  10-28-19   No growth at 5 days.  --  --        WBC Count: 8.80 (11-04-19 @ 05:46)  WBC Count: 9.37 (11-03-19 @ 07:45)  WBC Count: 10.61 (11-02-19 @ 04:49)  WBC Count: 11.92 (11-01-19 @ 02:10)  WBC Count: 10.09 (10-31-19 @ 09:13)  WBC Count: 8.42 (10-31-19 @ 00:48)  WBC Count: 10.68 (10-30-19 @ 19:40)  WBC Count: 9.35 (10-30-19 @ 06:12)  WBC Count: 9.88 (10-29-19 @ 06:33)  WBC Count: 10.82 (10-28-19 @ 16:58)

## 2019-11-04 NOTE — PROGRESS NOTE ADULT - SUBJECTIVE AND OBJECTIVE BOX
VITAL SIGNS    Telemetry:  sr   70-90    Vital Signs Last 24 Hrs  T(C): 36.5 (19 @ 12:32), Max: 36.9 (19 @ 05:00)  T(F): 97.7 (19 @ 12:32), Max: 98.4 (19 @ 05:00)  HR: 96 (19 @ 12:32) (92 - 96)  BP: 138/84 (19 @ 12:32) (137/83 - 166/92)  RR: 18 (19 @ 12:32) (18 - 20)  SpO2: 96% (19 @ 12:32) (94% - 96%)                   Daily     Daily Weight in k.7 (2019 08:24)      Bilirubin Total, Serum: 0.5 mg/dL ( @ 05:46)    CAPILLARY BLOOD GLUCOSE      POCT Blood Glucose.: 114 mg/dL (2019 12:28)  POCT Blood Glucose.: 96 mg/dL (2019 08:04)  POCT Blood Glucose.: 109 mg/dL (2019 21:52)  POCT Blood Glucose.: 89 mg/dL (2019 16:58)          Drains:    Shaneka ,   2 FELI's                        PHYSICAL EXAM    Neurology: alert and oriented x 3, moves all extremities with no defecits  CV :  RRR  Sternal Wound :  CDI , Stable  Lungs:   CTA B/L  Abdomen: soft, nontender, nondistended, positive bowel sounds  Extremities:     no edema  no calf tenderness

## 2019-11-04 NOTE — CHART NOTE - NSCHARTNOTEFT_GEN_A_CORE
Pt seen for malnutrition follow-up.     Pt is a 69 year old with PMHx of HTN, T2DM, recent CABG x3 on (10/17/19) and readmitted for sternal wound infection. Pt now s/p sternal debridement and 11/3 PICC placed to RUE for antibiotics.    Source: Patient [x]    Family [ ]     other [x] electronic medical records    Diet: Consistent CHO No snacks, Supplement: Glucerna x 1 daily (220 kcal, 10 grams of protein daily).    Patient reports [ ] nausea  [ ] vomiting [ ] diarrhea [ ] constipation  [ ]chewing problems [ ] swallowing issues  [ ] other:     PO intake (meals):  < 50% [ ] 50-75% [ ]   % [ ]  other :  PO intake (supplement):     Source for PO intake [ ] Patient [ ] family [ ] chart [ ] staff [ ] other    Enteral/Parenteral Nutrition: n/a    Current Weight: 160.2 pounds (current, standing, no edema noted). 165.1 pounds admit wt 10/28.    Pertinent Medications: MEDICATIONS  (STANDING):  aspirin enteric coated 81 milliGRAM(s) Oral daily  atorvastatin 40 milliGRAM(s) Oral at bedtime  chlorhexidine 2% Cloths 1 Application(s) Topical <User Schedule>  chlorhexidine 4% Liquid 1 Application(s) Topical <User Schedule>  dextrose 50% Injectable 50 milliLiter(s) IV Push every 15 minutes  dextrose 50% Injectable 25 milliLiter(s) IV Push every 15 minutes  heparin  Injectable 5000 Unit(s) SubCutaneous every 8 hours  insulin lispro (HumaLOG) corrective regimen sliding scale   SubCutaneous Before meals and at bedtime  meropenem  IVPB 1000 milliGRAM(s) IV Intermittent every 8 hours  metoprolol tartrate 50 milliGRAM(s) Oral two times a day  polyethylene glycol 3350 17 Gram(s) Oral daily  sodium chloride 0.9% lock flush 3 milliLiter(s) IV Push every 8 hours  sodium chloride 0.9%. 1000 milliLiter(s) (10 mL/Hr) IV Continuous <Continuous>  vancomycin  IVPB 1000 milliGRAM(s) IV Intermittent every 12 hours    MEDICATIONS  (PRN):  ondansetron Injectable 4 milliGRAM(s) IV Push once PRN Nausea and/or Vomiting  oxycodone    5 mG/acetaminophen 325 mG 2 Tablet(s) Oral every 4 hours PRN Moderate Pain (4 - 6)  sodium chloride 0.9% lock flush 10 milliLiter(s) IV Push every 1 hour PRN Pre/post blood products, medications, blood draw, and to maintain line patency    Pertinent Labs:  11-04 Na136 mmol/L Glu 110 mg/dL<H> K+ 4.4 mmol/L Cr  0.70 mg/dL BUN 14 mg/dL 11-01 Phos 2.6 mg/dL 11-04 Alb 2.6 g/dL<L> 10-12 [id number] 6.2 %<H> 10-16 Chol 191 mg/dL  mg/dL<H> HDL 35 mg/dL<L> Trig 182 mg/dL<H>  CAPILLARY BLOOD GLUCOSE  POCT Blood Glucose.: 114 mg/dL (04 Nov 2019 12:28)  POCT Blood Glucose.: 96 mg/dL (04 Nov 2019 08:04)  POCT Blood Glucose.: 109 mg/dL (03 Nov 2019 21:52)  POCT Blood Glucose.: 89 mg/dL (03 Nov 2019 16:58)      Skin:       Estimated Needs:   [x] no change since previous assessment  [ ] recalculated:       Previous Nutrition Diagnosis:     [ ] Inadequate Energy Intake [ ]Inadequate Oral Intake [ ] Excessive Energy Intake     [ ] Underweight [ ] Increased Nutrient Needs [ ] Overweight/Obesity     [ ] Altered GI Function [ ] Unintended Weight Loss [ ] Food & Nutrition Related Knowledge Deficit [ ] Malnutrition          Nutrition Diagnosis is [ ] ongoing  [ ] resolved [ ] not applicable          New Nutrition Diagnosis: [ ] not applicable    [ ] Inadequate Protein Energy Intake [ ]Inadequate Oral Intake [ ] Excessive Energy Intake     [ ] Underweight [ ] Increased Nutrient Needs [ ] Overweight/Obesity     [ ] Altered GI Function [ ] Unintended Weight Loss [ ] Food & Nutrition Related Knowledge Deficit[ ] Limited Adherence to nutrition related recommendations [ ] Malnutrition  [ ] other: Free text       Related to:      As evidenced by:      Interventions:     Recommend    [ ] Change Diet To:    [ ] Nutrition Supplement    [ ] Nutrition Support    [ ] Other:        Monitoring and Evaluation:     [ ] PO intake [ ] Tolerance to diet prescription [ ] weights [ ] follow up per protocol    [ ] other: Pt seen for malnutrition follow-up. Pt reports only slight improvement in po intake/appetite, consuming ~1/2 of meals at this time. C/o constipation however did have BM today; is amenable to try prune juice with meal. Reports drinking 100% of Glucerna supplement daily, declines additional containers daily.     Pt is a 69 year old with PMHx of HTN, T2DM, recent CABG x3 on (10/17/19) and readmitted for sternal wound infection. Pt now s/p sternal debridement and 11/3 PICC placed to RUE for antibiotics. Deemed malnourished upon last assessment, addressed with po diet and supplement.     Source: Patient [x]    Family [ ]     other [x] electronic medical records    Diet: Consistent CHO No snacks, Supplement: Glucerna 1.2 x 1 daily (285 kcal, 14 grams of protein daily).    Patient reports [ ] nausea  [ ] vomiting [ ] diarrhea [x] constipation, last BM today per pt [ ]chewing problems [ ] swallowing issues  [ ] other:     PO intake (meals):  < 50% [ ] 50-75% [x]   % [ ]  other :  PO intake (supplement): 100% 1 container Glucerna 1.2 supplement daily    Source for PO intake [x] Patient [ ] family [ ] chart [x] staff [ ] other    Enteral/Parenteral Nutrition: n/a    Current Weight: 160.2 pounds (current, standing, no edema noted). 165.1 pounds admit wt 10/28.    Pertinent Medications: MEDICATIONS  (STANDING):  aspirin enteric coated 81 milliGRAM(s) Oral daily  atorvastatin 40 milliGRAM(s) Oral at bedtime  chlorhexidine 2% Cloths 1 Application(s) Topical <User Schedule>  chlorhexidine 4% Liquid 1 Application(s) Topical <User Schedule>  dextrose 50% Injectable 50 milliLiter(s) IV Push every 15 minutes  dextrose 50% Injectable 25 milliLiter(s) IV Push every 15 minutes  heparin  Injectable 5000 Unit(s) SubCutaneous every 8 hours  insulin lispro (HumaLOG) corrective regimen sliding scale   SubCutaneous Before meals and at bedtime  meropenem  IVPB 1000 milliGRAM(s) IV Intermittent every 8 hours  metoprolol tartrate 50 milliGRAM(s) Oral two times a day  polyethylene glycol 3350 17 Gram(s) Oral daily  sodium chloride 0.9% lock flush 3 milliLiter(s) IV Push every 8 hours  sodium chloride 0.9%. 1000 milliLiter(s) (10 mL/Hr) IV Continuous <Continuous>  vancomycin  IVPB 1000 milliGRAM(s) IV Intermittent every 12 hours    MEDICATIONS  (PRN):  ondansetron Injectable 4 milliGRAM(s) IV Push once PRN Nausea and/or Vomiting  oxycodone    5 mG/acetaminophen 325 mG 2 Tablet(s) Oral every 4 hours PRN Moderate Pain (4 - 6)  sodium chloride 0.9% lock flush 10 milliLiter(s) IV Push every 1 hour PRN Pre/post blood products, medications, blood draw, and to maintain line patency    Pertinent Labs:  11-04 Na136 mmol/L Glu 110 mg/dL<H> K+ 4.4 mmol/L Cr  0.70 mg/dL BUN 14 mg/dL 11-01 Phos 2.6 mg/dL 11-04 Alb 2.6 g/dL<L> 10-12 [id number] 6.2 %<H> 10-16 Chol 191 mg/dL  mg/dL<H> HDL 35 mg/dL<L> Trig 182 mg/dL<H>  CAPILLARY BLOOD GLUCOSE  POCT Blood Glucose.: 114 mg/dL (04 Nov 2019 12:28)  POCT Blood Glucose.: 96 mg/dL (04 Nov 2019 08:04)  POCT Blood Glucose.: 109 mg/dL (03 Nov 2019 21:52)  POCT Blood Glucose.: 89 mg/dL (03 Nov 2019 16:58)      Skin: Surgical incision noted      Estimated Needs:   [x] no change since previous assessment  [ ] recalculated:       Previous Nutrition Diagnosis: Severe Malnutrition          Nutrition Diagnosis is [x] ongoing  [ ] resolved [ ] not applicable     -Care plan ongoing       New Nutrition Diagnosis: [x] not applicable       Interventions:     1) Recommend continue current consistent CHO diet for glycemic control ; may keep otherwise liberalized in setting of malnutrition  -Continue Glucerna 1.2 1 container daily to supplement meals. Pt declines additional Glucerna containers daily,  2) Will add 4oz. prune juice to dinner and breakfast tray daily to promote BM. Bowel regimen in order        Monitoring and Evaluation:     [x] PO intake [x] Tolerance to diet prescription [x] weights [x] follow up per protocol    [x] other: RD remains available: Keshia Ramos MS, RDN, CDN, CDE. #[phone]

## 2019-11-04 NOTE — PROGRESS NOTE ADULT - SUBJECTIVE AND OBJECTIVE BOX
S: Patient doing well, no complaints.     O: Vital Signs  T(C): 36.9 (11-04 @ 05:00), Max: 36.9 (11-04 @ 05:00)  HR: 92 (11-04 @ 05:00) (89 - 94)  BP: 145/84 (11-04 @ 05:00) (127/74 - 166/92)  RR: 19 (11-04 @ 05:00) (18 - 20)  SpO2: 95% (11-04 @ 05:00) (95% - 100%)  11-02-19 @ 08:01  -  11-03-19 @ 07:00  --------------------------------------------------------  IN: 1210 mL / OUT: 1180 mL / NET: 30 mL    11-03-19 @ 07:01  -  11-04-19 @ 06:41  --------------------------------------------------------  IN: 740 mL / OUT: 1555 mL / NET: -815 mL      General: alert and oriented, NAD  Resp: airway patent, respirations unlabored  Chest: prevena vac in place holding suction, JPx2 w/ serosanguinous output  Extremities: no edema  Skin: warm, dry, appropriate color                          10.5   8.80  )-----------( 279      ( 04 Nov 2019 05:46 )             33.3   11-04    136  |  101  |  14  ----------------------------<  110<H>  4.4   |  25  |  0.70    Ca    8.6      04 Nov 2019 05:46    TPro  6.0  /  Alb  2.6<L>  /  TBili  0.5  /  DBili  x   /  AST  37  /  ALT  26  /  AlkPhos  106  11-04

## 2019-11-04 NOTE — PROGRESS NOTE ADULT - PROBLEM SELECTOR PLAN 3
-R pigtail in place for noted pleural effusion this AM   -Daily CXR   Pigtail d/c 11/2 xray to follow
-R pigtail in place for noted pleural effusion this AM   -Daily CXR   Pigtail d/c 11/2 xray to follow
-R pigtail in place for noted pleural effusion this AM   -Daily CXR
-R pigtail in place for noted pleural effusion this AM   -Daily CXR   Pigtail d/c 11/2 xray to follow

## 2019-11-04 NOTE — PROGRESS NOTE ADULT - ASSESSMENT
69M PMH CAD s/p CABG 2017, HTN, HLD, DM, hx s/p CABG x3 10/17/19 c/b drainage from sternal wound and is now s/p sternal wound debridement with R pectoralis flap and L pectoralis advancement 10/30/19.  Large pleural effusion drained 11/2 in CTICU. Now recovering well on floor.    - Continue prevena vac to chest  - Continue FELI drain care, monitor output  - No heavy lifting, no pushing or pulling actions w/both arms, no transferring w/b/l UE  - Rest of care per primary team  - Will follow    Plastic Surgery [id number] 69M PMH CAD s/p CABG 2017, HTN, HLD, DM, hx s/p CABG x3 10/17/19 c/b drainage from sternal wound and is now s/p sternal wound debridement with R pectoralis flap and L pectoralis advancement 10/30/19.  Large pleural effusion drained 11/2 in CTICU. Now recovering well on floor.    - Continue prevena vac to chest, will remove prior to discharge  - Continue FELI drain care, monitor output. Patient to be discharged with drains, follow up with Dr. Shell for removal once output <40cc/24hr. Patient may call [phone] to schedule an appointment.   - No heavy lifting, no pushing or pulling actions w/both arms, no transferring w/b/l UE  - Rest of care per primary team  - Will follow    Plastic Surgery [phone]

## 2019-11-04 NOTE — PROGRESS NOTE ADULT - PROBLEM SELECTOR PLAN 1
-Sternal wound debridement on 10/30 with pectoralis muscle flap-->FELI drains on R and L   -Prevena dressing in place to mid-sternum  -f/u cultures-->ID following, continuing Vancomycin 1 Gram IV Q12 and Meropenum 1 Gram IV Q8 hours  -Encourage incentive spirometry  -Activity as tolerated  Follwed by Plastics  -Discharge planning: home when medically stable   F -Sternal wound debridement on 10/30 with pectoralis muscle flap-->  FELI drains on R and L   -Prevena dressing in place to mid-sternum  -f/u cultures-->ID following, continuing Vancomycin 1 Gram IV Q12 and Meropenum 1 Gram IV Q8 hours      -Discharge planning: home when medically stable

## 2019-11-04 NOTE — PROGRESS NOTE ADULT - ASSESSMENT
69 yr old male re-admit with MT distal drainage x 5 days s/p C3L 10/17/19. Readmitted 10/28 for Sternal wound infection.  Hospital Course:   10/29 Blood cultures x 2 --> NTD. Wound culture NTD. Plastics following.  ID following on Vanco 1 Gram Q12 and Meropenum 1 Gram IV Q8 hours.   10/30: NPO today for Sternal wound debridement and muscle flap with Dr. Shell and Dr. Zavaleta-->Sternal debridement with removal of sternotomy wires with irrigation. OR cultures obtained. Pectoralis muscle flap on the R with pec advancement on the L.  10/31: Recovering in CTU. Received 2 units of PRBCs for low H&H. Vanco and Meropenem for sternal wound.   11/1: Pt transferred to [address] [place] [address] in stable condition. Prevena dressing in place to mid-sternum. 2 FELI drains on right and left mid chest with serosanguinous fluid. R pigtail placed on unit this AM for pleural effusion-continues to drain. Continuing Meropenem and Vancomycin as per ID while awaiting culture results.   11/2 VSS pigtail d/c this am  PICC placement pending  for  abx   11/3 PICC placed to RUE yesterday. Continue prevena vac to chest. Continue FELI drain care, monitor output. No heavy lifting, no pushing or pulling actions w/both arms, no transferring w/ b/l UE.   Anticipate Discharge home with IV abx and JPs   11/4   Shaneka,   2 FELI   ID following   prelim cx neg  from OR   on vanco and

## 2019-11-04 NOTE — PROGRESS NOTE ADULT - PROBLEM SELECTOR PLAN 2
-Continue ASA 81mg, Lopressor 50mg BID, Atorvastatin 40mg QD  -Incentive spirometry  -activity as tolerated -Continue ASA 81mg, Lopressor 50mg BID, Atorvastatin 40mg QD

## 2019-11-04 NOTE — PROGRESS NOTE ADULT - ASSESSMENT
69 yr old male direct admit from CTS office with complaints of MT  cloudy distal drainage  from sternal wound x 5 days with associated chills and no fevers.  He is sp CABG x 3 10/17 19 with uncomplicated post op course.  His history includes HTN, NIDDM and remote cigarette smoking.  Mr Oropeza is admitted for wound culture,  blood culture and ID consult. (28 Oct 2019 15:42)  pt notes that discharge started last Wednesday 10/23/ When the drainage didn't improve or slow down, they called the cardiothoracic office today and was told to come in  Patient coughs, but unable to bring up phlegm  pt denies trauma to chest  pt terrie left shoulder pain since the time of surgery    Pt s/p purulent drainage from sternum, now S/P Sternal debridement with removal of sternotomy wires (X6) with irrigation. OR cultures obtained. Pectoralis muscle flap on the R with pec advancement on the L. Primary closure  Now on vancomycin and meropenem  await cultures   Check vancomycin levels    await routine, fungal and AFB cultures  Curious that gram stain was negative  follow vancomycin level- level from 11/1 was good    AWait final pathology. Again not only did nothing grow so far , but most of samples did not show polys  May send home on just vancomycin , to cover low virulence organisms    check ESR and CRP with morning bloods  await fungal and AFB cultures

## 2019-11-05 LAB
ANION GAP SERPL CALC-SCNC: 11 MMOL/L — SIGNIFICANT CHANGE UP (ref 5–17)
APTT BLD: 33.8 SEC — SIGNIFICANT CHANGE UP (ref 27.5–36.3)
BUN SERPL-MCNC: 12 MG/DL — SIGNIFICANT CHANGE UP (ref 7–23)
CALCIUM SERPL-MCNC: 8.2 MG/DL — LOW (ref 8.4–10.5)
CHLORIDE SERPL-SCNC: 101 MMOL/L — SIGNIFICANT CHANGE UP (ref 96–108)
CO2 SERPL-SCNC: 25 MMOL/L — SIGNIFICANT CHANGE UP (ref 22–31)
CREAT SERPL-MCNC: 0.63 MG/DL — SIGNIFICANT CHANGE UP (ref 0.5–1.3)
CRP SERPL-MCNC: 6.87 MG/DL — HIGH (ref 0–0.4)
ERYTHROCYTE [SEDIMENTATION RATE] IN BLOOD: 51 MM/HR — HIGH (ref 0–20)
GLUCOSE BLDC GLUCOMTR-MCNC: 106 MG/DL — HIGH (ref 70–99)
GLUCOSE BLDC GLUCOMTR-MCNC: 114 MG/DL — HIGH (ref 70–99)
GLUCOSE BLDC GLUCOMTR-MCNC: 128 MG/DL — HIGH (ref 70–99)
GLUCOSE BLDC GLUCOMTR-MCNC: 150 MG/DL — HIGH (ref 70–99)
GLUCOSE SERPL-MCNC: 112 MG/DL — HIGH (ref 70–99)
HCT VFR BLD CALC: 30.4 % — LOW (ref 39–50)
HGB BLD-MCNC: 9.8 G/DL — LOW (ref 13–17)
MCHC RBC-ENTMCNC: 28.1 PG — SIGNIFICANT CHANGE UP (ref 27–34)
MCHC RBC-ENTMCNC: 32.2 GM/DL — SIGNIFICANT CHANGE UP (ref 32–36)
MCV RBC AUTO: 87.1 FL — SIGNIFICANT CHANGE UP (ref 80–100)
NRBC # BLD: 0 /100 WBCS — SIGNIFICANT CHANGE UP (ref 0–0)
PLATELET # BLD AUTO: 208 K/UL — SIGNIFICANT CHANGE UP (ref 150–400)
POTASSIUM SERPL-MCNC: 4.2 MMOL/L — SIGNIFICANT CHANGE UP (ref 3.5–5.3)
POTASSIUM SERPL-SCNC: 4.2 MMOL/L — SIGNIFICANT CHANGE UP (ref 3.5–5.3)
RBC # BLD: 3.49 M/UL — LOW (ref 4.2–5.8)
RBC # FLD: 14.9 % — HIGH (ref 10.3–14.5)
SODIUM SERPL-SCNC: 137 MMOL/L — SIGNIFICANT CHANGE UP (ref 135–145)
SURGICAL PATHOLOGY STUDY: SIGNIFICANT CHANGE UP
VANCOMYCIN TROUGH SERPL-MCNC: 16.8 UG/ML — SIGNIFICANT CHANGE UP (ref 10–20)
WBC # BLD: 8.14 K/UL — SIGNIFICANT CHANGE UP (ref 3.8–10.5)
WBC # FLD AUTO: 8.14 K/UL — SIGNIFICANT CHANGE UP (ref 3.8–10.5)

## 2019-11-05 PROCEDURE — 99024 POSTOP FOLLOW-UP VISIT: CPT

## 2019-11-05 PROCEDURE — 99232 SBSQ HOSP IP/OBS MODERATE 35: CPT

## 2019-11-05 RX ADMIN — OXYCODONE AND ACETAMINOPHEN 2 TABLET(S): 5; 325 TABLET ORAL at 21:11

## 2019-11-05 RX ADMIN — MEROPENEM 100 MILLIGRAM(S): 1 INJECTION INTRAVENOUS at 13:51

## 2019-11-05 RX ADMIN — OXYCODONE AND ACETAMINOPHEN 2 TABLET(S): 5; 325 TABLET ORAL at 05:19

## 2019-11-05 RX ADMIN — MEROPENEM 100 MILLIGRAM(S): 1 INJECTION INTRAVENOUS at 05:19

## 2019-11-05 RX ADMIN — SODIUM CHLORIDE 3 MILLILITER(S): 9 INJECTION INTRAMUSCULAR; INTRAVENOUS; SUBCUTANEOUS at 05:17

## 2019-11-05 RX ADMIN — SODIUM CHLORIDE 3 MILLILITER(S): 9 INJECTION INTRAMUSCULAR; INTRAVENOUS; SUBCUTANEOUS at 13:46

## 2019-11-05 RX ADMIN — CHLORHEXIDINE GLUCONATE 1 APPLICATION(S): 213 SOLUTION TOPICAL at 09:23

## 2019-11-05 RX ADMIN — OXYCODONE AND ACETAMINOPHEN 2 TABLET(S): 5; 325 TABLET ORAL at 13:51

## 2019-11-05 RX ADMIN — HEPARIN SODIUM 5000 UNIT(S): 5000 INJECTION INTRAVENOUS; SUBCUTANEOUS at 05:19

## 2019-11-05 RX ADMIN — Medication 50 MILLIGRAM(S): at 18:51

## 2019-11-05 RX ADMIN — OXYCODONE AND ACETAMINOPHEN 2 TABLET(S): 5; 325 TABLET ORAL at 09:31

## 2019-11-05 RX ADMIN — OXYCODONE AND ACETAMINOPHEN 2 TABLET(S): 5; 325 TABLET ORAL at 05:49

## 2019-11-05 RX ADMIN — HEPARIN SODIUM 5000 UNIT(S): 5000 INJECTION INTRAVENOUS; SUBCUTANEOUS at 21:11

## 2019-11-05 RX ADMIN — ATORVASTATIN CALCIUM 40 MILLIGRAM(S): 80 TABLET, FILM COATED ORAL at 21:10

## 2019-11-05 RX ADMIN — OXYCODONE AND ACETAMINOPHEN 2 TABLET(S): 5; 325 TABLET ORAL at 10:30

## 2019-11-05 RX ADMIN — Medication 250 MILLIGRAM(S): at 05:49

## 2019-11-05 RX ADMIN — SODIUM CHLORIDE 3 MILLILITER(S): 9 INJECTION INTRAMUSCULAR; INTRAVENOUS; SUBCUTANEOUS at 21:11

## 2019-11-05 RX ADMIN — OXYCODONE AND ACETAMINOPHEN 2 TABLET(S): 5; 325 TABLET ORAL at 21:41

## 2019-11-05 RX ADMIN — Medication 81 MILLIGRAM(S): at 13:50

## 2019-11-05 RX ADMIN — MEROPENEM 100 MILLIGRAM(S): 1 INJECTION INTRAVENOUS at 21:12

## 2019-11-05 RX ADMIN — OXYCODONE AND ACETAMINOPHEN 2 TABLET(S): 5; 325 TABLET ORAL at 14:50

## 2019-11-05 RX ADMIN — Medication 50 MILLIGRAM(S): at 05:19

## 2019-11-05 RX ADMIN — Medication 250 MILLIGRAM(S): at 18:51

## 2019-11-05 RX ADMIN — HEPARIN SODIUM 5000 UNIT(S): 5000 INJECTION INTRAVENOUS; SUBCUTANEOUS at 13:50

## 2019-11-05 NOTE — PROGRESS NOTE ADULT - SUBJECTIVE AND OBJECTIVE BOX
Patient is a 69y old  Male who presents with a chief complaint of Sternal wound drainage (05 Nov 2019 08:16)    Being followed by ID for        Interval history:  pt denies diarrhea, chest pain SOb , urinary sxs, or abd pain  remainder 10 point ROS negative  No other acute events          PAST MEDICAL & SURGICAL HISTORY:  HTN (hypertension)  HLD (hyperlipidemia)  H/O impaired glucose tolerance  Coronary disease: sp  CABG    Allergies    No Known Allergies    Intolerances      Antimicrobials:    meropenem  IVPB 1000 milliGRAM(s) IV Intermittent every 8 hours  vancomycin  IVPB 1000 milliGRAM(s) IV Intermittent every 12 hours    MEDICATIONS  (STANDING):  aspirin enteric coated 81 milliGRAM(s) Oral daily  atorvastatin 40 milliGRAM(s) Oral at bedtime  chlorhexidine 2% Cloths 1 Application(s) Topical <User Schedule>  chlorhexidine 4% Liquid 1 Application(s) Topical <User Schedule>  dextrose 50% Injectable 50 milliLiter(s) IV Push every 15 minutes  dextrose 50% Injectable 25 milliLiter(s) IV Push every 15 minutes  heparin  Injectable 5000 Unit(s) SubCutaneous every 8 hours  insulin lispro (HumaLOG) corrective regimen sliding scale   SubCutaneous Before meals and at bedtime  meropenem  IVPB 1000 milliGRAM(s) IV Intermittent every 8 hours  metoprolol tartrate 50 milliGRAM(s) Oral two times a day  polyethylene glycol 3350 17 Gram(s) Oral daily  senna 2 Tablet(s) Oral at bedtime  sodium chloride 0.9% lock flush 3 milliLiter(s) IV Push every 8 hours  sodium chloride 0.9%. 1000 milliLiter(s) (10 mL/Hr) IV Continuous <Continuous>  vancomycin  IVPB 1000 milliGRAM(s) IV Intermittent every 12 hours      Vital Signs Last 24 Hrs  T(C): 36.3 (11-05-19 @ 09:30), Max: 36.5 (11-04-19 @ 12:32)  T(F): 97.4 (11-05-19 @ 09:30), Max: 97.7 (11-04-19 @ 12:32)  HR: 94 (11-05-19 @ 09:30) (94 - 98)  BP: 121/72 (11-05-19 @ 09:30) (121/72 - 166/87)  BP(mean): 113 (11-04-19 @ 20:05) (113 - 113)  RR: 16 (11-05-19 @ 09:30) (16 - 18)  SpO2: 95% (11-05-19 @ 09:30) (94% - 98%)    Physical Exam:    Constitutional well preserved,comfortable,pleasant    HEENT PERRLA EOMI,No pallor or icterus    No oral exudate or erythema    Neck supple no JVD or LN    Chest Good AE,CTA  vac on sternal wound    CVS RRR S1 S2 WNl    Abd soft BS normal No tenderness     Ext No cyanosis clubbing or edema    IV site no erythema tenderness or discharge    Joints no swelling or LOM    CNS AAO X 3 no focal    Lab Data:                          9.8    8.14  )-----------( 208      ( 05 Nov 2019 05:04 )             30.4       11-05    137  |  101  |  12  ----------------------------<  112<H>  4.2   |  25  |  0.63    Ca    8.2<L>      05 Nov 2019 05:04    TPro  6.0  /  Alb  2.6<L>  /  TBili  0.5  /  DBili  x   /  AST  37  /  ALT  26  /  AlkPhos  106  11-04    Vancomycin Level, Trough (11.05.19 @ 05:05)    Vancomycin Level, Trough: 16.8: Vancomycin trough levels should be rapidly reached and maintained at  15-20 ug/ml for life threatening MRSA  infections such as sepsis, endocarditis, osteomyelitis and pneumonia. A  first trough level should be drawn  before the 3rd or 4th dose.  Risk of renal toxicity is increased for levels >15 ug/ml, in patients on  other nephrotoxic drugs, who are  hemodynamically unstable, have unstable renal function, or are on  Vancomycin therapy for >14 days. Renal function with  creatinine levels should be monitored for those patients. ug/mL          .Body Fluid deep culture chest  10-31-19   Testing in progress  --  --      .Tissue deep tissue culture chest  10-31-19   Testing in progress  --    No polymorphonuclear cells seen  No organisms seen      .Body Fluid Pericardial Fluid  10-31-19   No growth at 5 days  --    polymorphonuclear leukocytes seen  No organisms seen  by cytocentrifuge      .Body Fluid superficial culture  10-31-19   Testing in progress  --    No polymorphonuclear cells seen  No organisms seen  by cytocentrifuge        Vancomycin Level, Trough: 16.8 ug/mL (11-05-19 @ 05:05)      WBC Count: 8.14 (11-05-19 @ 05:04)  WBC Count: 8.80 (11-04-19 @ 05:46)  WBC Count: 9.37 (11-03-19 @ 07:45)  WBC Count: 10.61 (11-02-19 @ 04:49)  WBC Count: 11.92 (11-01-19 @ 02:10)  WBC Count: 10.09 (10-31-19 @ 09:13)  WBC Count: 8.42 (10-31-19 @ 00:48)  WBC Count: 10.68 (10-30-19 @ 19:40)  WBC Count: 9.35 (10-30-19 @ 06:12)      Surgical Pathology Report (10.30.19 @ 10:01)    Surgical Pathology Report:   ACCESSION No:  [id number]    CHEVY AMARAL                      1        Surgical Final Report          Final Diagnosis    Sternal wires (gross diagnosis)    Verified by: ROSALINA MILES M.D.  (Electronic Signature)  Reported on: 11/05/19 09:08 [facility], [address] [place] [address]  _________________________________________________________________    Clinical History  sternal wound infection    Specimen(s) Submitted  1     Sternal wires    Gross Description  1. The specimen is received without fixative and the specimen  container is labeled: Sternal wires.  It consists of ten pieces  silver color metalic wires ranging from 0.7 cm to 9.5 cm in  length.  Photograph is taken. Gross examination only.  No  sections submitted.    In addition to other data that may appear on the specimen  container, the label has been inspected to confirm the presence  of the patient's name and date of birth.    Lauro Valdes 11/01/2019 16:18

## 2019-11-05 NOTE — PROGRESS NOTE ADULT - PROBLEM SELECTOR PLAN 1
-Sternal wound debridement on 10/30 with pectoralis muscle flap-->  FELI drains on R and L   -Prevena dressing in place to mid-sternum  -f/u cultures-->ID following, continuing Vancomycin 1 Gram IV Q12 and Meropenum 1 Gram IV Q8 hours      -Discharge planning: home when medically stable

## 2019-11-05 NOTE — PROGRESS NOTE ADULT - ASSESSMENT
69 yr old male direct admit from CTS office with complaints of MT  cloudy distal drainage  from sternal wound x 5 days with associated chills and no fevers.  He is sp CABG x 3 10/17 19 with uncomplicated post op course.  His history includes HTN, NIDDM and remote cigarette smoking.  Mr Oropeza is admitted for wound culture,  blood culture and ID consult. (28 Oct 2019 15:42)  pt notes that discharge started last Wednesday 10/23/ When the drainage didn't improve or slow down, they called the cardiothoracic office today and was told to come in  Patient coughs, but unable to bring up phlegm  pt denies trauma to chest  pt terrie left shoulder pain since the time of surgery    Pt s/p purulent drainage from sternum, now S/P Sternal debridement with removal of sternotomy wires (X6) with irrigation. OR cultures obtained. Pectoralis muscle flap on the R with pec advancement on the L. Primary closure  Now on vancomycin and meropenem  await cultures   Check vancomycin levels    await routine, fungal and AFB cultures  Curious that gram stain was negative  follow vancomycin level- level from 11/1 was good    final pathology- only sternal wires were sent . Again not only did nothing grow so far , but most of samples did not show polys  May send home on just vancomycin , to cover low virulence organisms    check ESR and CRP with morning bloods  await fungal and AFB cultures

## 2019-11-05 NOTE — PROGRESS NOTE ADULT - ASSESSMENT
69 yr old male re-admit with MT distal drainage x 5 days s/p C3L 10/17/19. Readmitted 10/28 for Sternal wound infection.  Hospital Course:   10/29 Blood cultures x 2 --> NTD. Wound culture NTD. Plastics following.  ID following on Vanco 1 Gram Q12 and Meropenum 1 Gram IV Q8 hours.   10/30: NPO today for Sternal wound debridement and muscle flap with Dr. Shell and Dr. Zavaleta-->Sternal debridement with removal of sternotomy wires with irrigation. OR cultures obtained. Pectoralis muscle flap on the R with pec advancement on the L.  10/31: Recovering in CTU. Received 2 units of PRBCs for low H&H. Vanco and Meropenem for sternal wound.   11/1: Pt transferred to [address] [place] [address] in stable condition. Prevena dressing in place to mid-sternum. 2 FELI drains on right and left mid chest with serosanguinous fluid. R pigtail placed on unit this AM for pleural effusion-continues to drain. Continuing Meropenem and Vancomycin as per ID while awaiting culture results.   11/2 VSS pigtail d/c this am  PICC placement pending  for  abx   11/3 PICC placed to RUE yesterday. Continue prevena vac to chest. Continue FELI drain care, monitor output. No heavy lifting, no pushing or pulling actions w/both arms, no transferring w/ b/l UE.   Anticipate Discharge home with IV abx and JPs   11/4   Shaneka,   2 FELI   ID following   prelim cx neg  from OR   11/5 Meropenum discontinued, BC and OR cultures negative for growth. IV abx infusion as out patient arranged. Maintain JPSS. Remove preveena dressing prior to discharge. Follow up with Plastic Surgery as out patient. Continue FELI drain care, monitor output. Patient to be discharged with drains, follow up with Dr. Shell for removal once output <40cc/24hr. Patient may call [phone] to schedule an appointment.  Check ESR and CRP with morning bloods

## 2019-11-05 NOTE — PROGRESS NOTE ADULT - SUBJECTIVE AND OBJECTIVE BOX
VITAL SIGNS    Telemetry:  SR 70-80  Vital Signs Last 24 Hrs  T(C): 36.7 (19 @ 12:51), Max: 36.7 (19 @ 12:51)  T(F): 98 (19 @ 12:51), Max: 98 (19 @ 12:51)  HR: 94 (19 @ 12:51) (94 - 98)  BP: 106/75 (19 @ 12:51) (106/75 - 166/87)  RR: 18 (19 @ 12:51) (16 - 18)  SpO2: 95% (19 @ 12:51) (95% - 98%)             @ 07:01  -   @ 07:00  --------------------------------------------------------  IN: 1530 mL / OUT: 696 mL / NET: 834 mL     @ 07:01  -   @ 15:26  --------------------------------------------------------  IN: 720 mL / OUT: 70 mL / NET: 650 mL       Daily     Daily Weight in k.8 (2019 08:29)  Admit Wt: Drug Dosing Weight  Height (cm): 177.8 (30 Oct 2019 14:33)  Weight (kg): 74.9 (30 Oct 2019 14:33)  BMI (kg/m2): 23.7 (30 Oct 2019 14:33)  BSA (m2): 1.92 (30 Oct 2019 14:33)      CAPILLARY BLOOD GLUCOSE      POCT Blood Glucose.: 150 mg/dL (2019 12:11)  POCT Blood Glucose.: 106 mg/dL (2019 08:01)  POCT Blood Glucose.: 114 mg/dL (2019 21:45)  POCT Blood Glucose.: 115 mg/dL (2019 16:53)          MEDICATIONS  aspirin enteric coated 81 milliGRAM(s) Oral daily  atorvastatin 40 milliGRAM(s) Oral at bedtime  bisacodyl 5 milliGRAM(s) Oral every 12 hours PRN  chlorhexidine 2% Cloths 1 Application(s) Topical <User Schedule>  chlorhexidine 4% Liquid 1 Application(s) Topical <User Schedule>  dextrose 50% Injectable 50 milliLiter(s) IV Push every 15 minutes  dextrose 50% Injectable 25 milliLiter(s) IV Push every 15 minutes  heparin  Injectable 5000 Unit(s) SubCutaneous every 8 hours  insulin lispro (HumaLOG) corrective regimen sliding scale   SubCutaneous Before meals and at bedtime  meropenem  IVPB 1000 milliGRAM(s) IV Intermittent every 8 hours  metoprolol tartrate 50 milliGRAM(s) Oral two times a day  ondansetron Injectable 4 milliGRAM(s) IV Push once PRN  oxycodone    5 mG/acetaminophen 325 mG 2 Tablet(s) Oral every 4 hours PRN  polyethylene glycol 3350 17 Gram(s) Oral daily  senna 2 Tablet(s) Oral at bedtime  sodium chloride 0.9% lock flush 10 milliLiter(s) IV Push every 1 hour PRN  sodium chloride 0.9% lock flush 3 milliLiter(s) IV Push every 8 hours  sodium chloride 0.9%. 1000 milliLiter(s) IV Continuous <Continuous>  vancomycin  IVPB 1000 milliGRAM(s) IV Intermittent every 12 hours      >>> <<<  PHYSICAL EXAM  Subjective: NAD  Neurology: alert and oriented x 3, nonfocal, no gross deficits  CV : s1s2  Sternal Wound :  preveena wound dressing intact, JPSS right - 60/100cc                                                                                           -40/90 cc  Lungs: CTA b/l  Abdomen: soft, NT,ND, (+ )BM  :  voiding  Extremities:  -c/c/e     LABS      137  |  101  |  12  ----------------------------<  112<H>  4.2   |  25  |  0.63    Ca    8.2<L>      2019 05:04    TPro  6.0  /  Alb  2.6<L>  /  TBili  0.5  /  DBili  x   /  AST  37  /  ALT  26  /  AlkPhos  106  11                                 9.8    8.14  )-----------( 208      ( 2019 05:04 )             30.4          PT/INR - ( 2019 05:46 )   PT: 13.8 sec;   INR: 1.20 ratio         PTT - ( 2019 05:05 )  PTT:33.8 sec       PAST MEDICAL & SURGICAL HISTORY:  HTN (hypertension)  HLD (hyperlipidemia)  H/O impaired glucose tolerance  Coronary disease: sp  CABG

## 2019-11-05 NOTE — PROGRESS NOTE ADULT - SUBJECTIVE AND OBJECTIVE BOX
S: Patient doing well, no complaints.     O: Vital Signs  T(C): 36.5 (11-05 @ 05:11), Max: 36.5 (11-04 @ 12:32)  HR: 98 (11-05 @ 05:11) (95 - 98)  BP: 146/85 (11-05 @ 05:11) (124/78 - 166/87)  RR: 18 (11-05 @ 05:11) (18 - 18)  SpO2: 95% (11-05 @ 05:11) (94% - 98%)  11-04-19 @ 07:01  -  11-05-19 @ 07:00  --------------------------------------------------------  IN: 1530 mL / OUT: 696 mL / NET: 834 mL      General: alert and oriented, NAD  Resp: airway patent, respirations unlabored  Chest: prevena vac in place holding suction, JPx2 w/ serosanguinous output  Extremities: no edema  Skin: warm, dry, appropriate color                          9.8    8.14  )-----------( 208      ( 05 Nov 2019 05:04 )             30.4   11-05    137  |  101  |  12  ----------------------------<  112<H>  4.2   |  25  |  0.63    Ca    8.2<L>      05 Nov 2019 05:04    TPro  6.0  /  Alb  2.6<L>  /  TBili  0.5  /  DBili  x   /  AST  37  /  ALT  26  /  AlkPhos  106  11-04

## 2019-11-06 ENCOUNTER — TRANSCRIPTION ENCOUNTER (OUTPATIENT)
Age: 69
End: 2019-11-06

## 2019-11-06 VITALS
DIASTOLIC BLOOD PRESSURE: 84 MMHG | RESPIRATION RATE: 18 BRPM | HEART RATE: 94 BPM | OXYGEN SATURATION: 95 % | TEMPERATURE: 97 F | SYSTOLIC BLOOD PRESSURE: 149 MMHG

## 2019-11-06 LAB
ANION GAP SERPL CALC-SCNC: 11 MMOL/L — SIGNIFICANT CHANGE UP (ref 5–17)
BUN SERPL-MCNC: 11 MG/DL — SIGNIFICANT CHANGE UP (ref 7–23)
CALCIUM SERPL-MCNC: 9.2 MG/DL — SIGNIFICANT CHANGE UP (ref 8.4–10.5)
CHLORIDE SERPL-SCNC: 98 MMOL/L — SIGNIFICANT CHANGE UP (ref 96–108)
CO2 SERPL-SCNC: 27 MMOL/L — SIGNIFICANT CHANGE UP (ref 22–31)
CREAT SERPL-MCNC: 0.67 MG/DL — SIGNIFICANT CHANGE UP (ref 0.5–1.3)
CRP SERPL-MCNC: 5.88 MG/DL — HIGH (ref 0–0.4)
ERYTHROCYTE [SEDIMENTATION RATE] IN BLOOD: 50 MM/HR — HIGH (ref 0–20)
GLUCOSE BLDC GLUCOMTR-MCNC: 101 MG/DL — HIGH (ref 70–99)
GLUCOSE BLDC GLUCOMTR-MCNC: 124 MG/DL — HIGH (ref 70–99)
GLUCOSE BLDC GLUCOMTR-MCNC: 99 MG/DL — SIGNIFICANT CHANGE UP (ref 70–99)
GLUCOSE SERPL-MCNC: 108 MG/DL — HIGH (ref 70–99)
HCT VFR BLD CALC: 32.2 % — LOW (ref 39–50)
HGB BLD-MCNC: 10.1 G/DL — LOW (ref 13–17)
MCHC RBC-ENTMCNC: 27.5 PG — SIGNIFICANT CHANGE UP (ref 27–34)
MCHC RBC-ENTMCNC: 31.4 GM/DL — LOW (ref 32–36)
MCV RBC AUTO: 87.7 FL — SIGNIFICANT CHANGE UP (ref 80–100)
NRBC # BLD: 0 /100 WBCS — SIGNIFICANT CHANGE UP (ref 0–0)
PLATELET # BLD AUTO: 220 K/UL — SIGNIFICANT CHANGE UP (ref 150–400)
POTASSIUM SERPL-MCNC: 4.3 MMOL/L — SIGNIFICANT CHANGE UP (ref 3.5–5.3)
POTASSIUM SERPL-SCNC: 4.3 MMOL/L — SIGNIFICANT CHANGE UP (ref 3.5–5.3)
RBC # BLD: 3.67 M/UL — LOW (ref 4.2–5.8)
RBC # FLD: 15 % — HIGH (ref 10.3–14.5)
SODIUM SERPL-SCNC: 136 MMOL/L — SIGNIFICANT CHANGE UP (ref 135–145)
WBC # BLD: 7.48 K/UL — SIGNIFICANT CHANGE UP (ref 3.8–10.5)
WBC # FLD AUTO: 7.48 K/UL — SIGNIFICANT CHANGE UP (ref 3.8–10.5)

## 2019-11-06 PROCEDURE — 99024 POSTOP FOLLOW-UP VISIT: CPT

## 2019-11-06 PROCEDURE — 99232 SBSQ HOSP IP/OBS MODERATE 35: CPT

## 2019-11-06 RX ORDER — FUROSEMIDE 40 MG
1 TABLET ORAL
Qty: 5 | Refills: 0
Start: 2019-11-06 | End: 2019-11-10

## 2019-11-06 RX ORDER — OXYCODONE AND ACETAMINOPHEN 5; 325 MG/1; MG/1
2 TABLET ORAL
Qty: 40 | Refills: 0
Start: 2019-11-06 | End: 2019-11-10

## 2019-11-06 RX ORDER — FUROSEMIDE 40 MG
40 TABLET ORAL DAILY
Refills: 0 | Status: DISCONTINUED | OUTPATIENT
Start: 2019-11-06 | End: 2019-11-06

## 2019-11-06 RX ORDER — METOPROLOL TARTRATE 50 MG
1 TABLET ORAL
Qty: 60 | Refills: 0
Start: 2019-11-06 | End: 2019-12-05

## 2019-11-06 RX ORDER — POTASSIUM CHLORIDE 20 MEQ
1 PACKET (EA) ORAL
Qty: 5 | Refills: 0
Start: 2019-11-06 | End: 2019-11-10

## 2019-11-06 RX ORDER — ATORVASTATIN CALCIUM 80 MG/1
1 TABLET, FILM COATED ORAL
Qty: 30 | Refills: 0
Start: 2019-11-06 | End: 2019-12-05

## 2019-11-06 RX ORDER — POTASSIUM CHLORIDE 20 MEQ
20 PACKET (EA) ORAL DAILY
Refills: 0 | Status: DISCONTINUED | OUTPATIENT
Start: 2019-11-06 | End: 2019-11-06

## 2019-11-06 RX ORDER — ASPIRIN/CALCIUM CARB/MAGNESIUM 324 MG
1 TABLET ORAL
Qty: 30 | Refills: 0
Start: 2019-11-06 | End: 2019-12-05

## 2019-11-06 RX ADMIN — CHLORHEXIDINE GLUCONATE 1 APPLICATION(S): 213 SOLUTION TOPICAL at 13:05

## 2019-11-06 RX ADMIN — Medication 250 MILLIGRAM(S): at 05:31

## 2019-11-06 RX ADMIN — HEPARIN SODIUM 5000 UNIT(S): 5000 INJECTION INTRAVENOUS; SUBCUTANEOUS at 13:06

## 2019-11-06 RX ADMIN — OXYCODONE AND ACETAMINOPHEN 2 TABLET(S): 5; 325 TABLET ORAL at 09:33

## 2019-11-06 RX ADMIN — Medication 50 MILLIGRAM(S): at 17:10

## 2019-11-06 RX ADMIN — Medication 81 MILLIGRAM(S): at 13:06

## 2019-11-06 RX ADMIN — Medication 50 MILLIGRAM(S): at 05:11

## 2019-11-06 RX ADMIN — OXYCODONE AND ACETAMINOPHEN 2 TABLET(S): 5; 325 TABLET ORAL at 09:03

## 2019-11-06 RX ADMIN — MEROPENEM 100 MILLIGRAM(S): 1 INJECTION INTRAVENOUS at 13:06

## 2019-11-06 RX ADMIN — Medication 250 MILLIGRAM(S): at 17:05

## 2019-11-06 RX ADMIN — SODIUM CHLORIDE 3 MILLILITER(S): 9 INJECTION INTRAMUSCULAR; INTRAVENOUS; SUBCUTANEOUS at 13:13

## 2019-11-06 RX ADMIN — OXYCODONE AND ACETAMINOPHEN 2 TABLET(S): 5; 325 TABLET ORAL at 05:41

## 2019-11-06 RX ADMIN — SODIUM CHLORIDE 3 MILLILITER(S): 9 INJECTION INTRAMUSCULAR; INTRAVENOUS; SUBCUTANEOUS at 05:05

## 2019-11-06 RX ADMIN — Medication 20 MILLIEQUIVALENT(S): at 13:06

## 2019-11-06 RX ADMIN — MEROPENEM 100 MILLIGRAM(S): 1 INJECTION INTRAVENOUS at 05:12

## 2019-11-06 RX ADMIN — Medication 40 MILLIGRAM(S): at 13:06

## 2019-11-06 RX ADMIN — OXYCODONE AND ACETAMINOPHEN 2 TABLET(S): 5; 325 TABLET ORAL at 05:11

## 2019-11-06 RX ADMIN — POLYETHYLENE GLYCOL 3350 17 GRAM(S): 17 POWDER, FOR SOLUTION ORAL at 13:07

## 2019-11-06 RX ADMIN — HEPARIN SODIUM 5000 UNIT(S): 5000 INJECTION INTRAVENOUS; SUBCUTANEOUS at 05:11

## 2019-11-06 NOTE — PROGRESS NOTE ADULT - ATTENDING COMMENTS
Su Lang M.D. ,   Pager [phone]     after 5PM/ weekends [phone]
Su Lang M.D. ,   Pager [phone]     after 5PM/ weekends [phone]

## 2019-11-06 NOTE — PROGRESS NOTE ADULT - PROBLEM SELECTOR PLAN 1
-Sternal wound debridement on 10/30 with pectoralis muscle flap-->  FELI drains on R and L   -Prevena dressing in place to mid-sternum  -f/u cultures-->ID following, continuing Vancomycin 1 Gram IV Q12 and Meropenum 1 Gram IV Q8 hours      -Discharge planning: home when medically stable s/p Sternal wound debridement on 10/30 with pectoralis muscle flap  continue FELI drains on R and L as per plastic surgery   Prevena dressing d/c 11/5   f/u cultures neg; ID following, continuing Vancomycin 1 Gram IV Q12 and meropenum- may discharge pt home on iv vanco only as per ID   Discharge planning: pt cleared for discharge as per ID/ plastic surgery and Dr. Zavaleta- pt and ex-wife refusing discharge today

## 2019-11-06 NOTE — DISCHARGE NOTE PROVIDER - CARE PROVIDER_API CALL
Siemon Shell (MD)  Plastic Surgery; Surgery of the Hand  [address] [place] [address]  Phone: [phone]  Fax: [phone]  Follow Up Time: 1 week Simeon Shell)  Plastic Surgery; Surgery of the Hand  [address] [facility] [address]  [place] [address]  Phone: [phone]  Fax: [phone]  Follow Up Time: 1 week    Su Lang)  Infectious Disease; Internal Medicine  [address] [place] [address]  Phone: [phone]  Fax: [phone]  Scheduled Appointment: 12/16/2019 12:00 PM    Angel Zavaleta)  Thoracic and Cardiac Surgery  [address] [place] [address]  Phone: [phone]  Fax: [phone]  Scheduled Appointment: 11/20/2019 11:30 AM    Alejandro Cadet)  Cardiovascular Disease; Internal Medicine  [address] [facility] [facility] [address]  [place] [address]  Phone: [phone]  Fax: [phone]  Follow Up Time:

## 2019-11-06 NOTE — DISCHARGE NOTE NURSING/CASE MANAGEMENT/SOCIAL WORK - NSDCPEEMAIL_GEN_ALL_CORE
[facility] for Tobacco Control email [email or web address]

## 2019-11-06 NOTE — PROGRESS NOTE ADULT - SUBJECTIVE AND OBJECTIVE BOX
S: No acute events overnight. Patient seen and examined. Doing well with no acute complaints.    O: Vital Signs  T(C): 36.8 (11-06 @ 04:29), Max: 37 (11-06 @ 00:03)  HR: 95 (11-06 @ 05:09) (88 - 95)  BP: 151/95 (11-06 @ 05:09) (106/75 - 164/83)  RR: 18 (11-06 @ 04:29) (16 - 18)  SpO2: 94% (11-06 @ 04:29) (94% - 97%)  11-05-19 @ 07:01  -  11-06-19 @ 07:00  --------------------------------------------------------  IN: 1230 mL / OUT: 715 mL / NET: 515 mL      General: alert and oriented, NAD  Chest: prevena vac removed at bedside, underlying incision c/d/i, 2eng1fs blister under vac tape dressed with gauze and tegaderm, soft, nontender, no collections, JPx2 SS                        10.1   7.48  )-----------( 220      ( 06 Nov 2019 06:34 )             32.2   11-06    136  |  98  |  11  ----------------------------<  108<H>  4.3   |  27  |  0.67    Ca    9.2      06 Nov 2019 06:34

## 2019-11-06 NOTE — DISCHARGE NOTE PROVIDER - NSDCFUADDAPPT_GEN_ALL_CORE_FT
Please follow up with Dr. Shell within x1 week after discharge from the hospital. You may call [phone] to schedule an appointment. You may follow up with Dr. Shell sooner for drain removal if drain output is less than 40 mL over 24hours.

## 2019-11-06 NOTE — DISCHARGE NOTE PROVIDER - NSDCACTIVITY_GEN_ALL_CORE
Do not drive or operate machinery/Showering allowed/Do not make important decisions/Stairs allowed/Walking - Indoors allowed/No heavy lifting/straining/Walking - Outdoors allowed

## 2019-11-06 NOTE — PROGRESS NOTE ADULT - ASSESSMENT
69M PMH CAD s/p CABG 2017, HTN, HLD, DM, hx s/p CABG x3 10/17/19 c/b drainage from sternal wound and is now s/p sternal wound debridement with R pectoralis flap and L pectoralis advancement 10/30/19.  Large pleural effusion drained 11/2 in CTICU. Now recovering well on floor.    - Prevena vac removed at bedside this morning, incision c/d/i, to remain open to air. Will change gauze/tegaderm to superficial blister daily.  - Continue FELI drain care, monitor output. Patient to be discharged with drains, follow up with Dr. Shell for removal once output <40cc/24hr. Patient may call [phone] to schedule an appointment.   - No heavy lifting, no pushing or pulling actions w/both arms, no transferring w/b/l UE  - Rest of care per primary team    Plastic Surgery [phone]

## 2019-11-06 NOTE — DISCHARGE NOTE PROVIDER - NSDCCPCAREPLAN_GEN_ALL_CORE_FT
PRINCIPAL DISCHARGE DIAGNOSIS  Diagnosis: Sternal wound infection  Assessment and Plan of Treatment: shower daily  weigh yourself daily  continue current prescriptions as ordered  increase activity as tolerated   no added salt; low fat; low cholesterol, low salt diet.   follow up with Cardiologist, Dr. Cadet,  in 1-2 weeks. Call to schedule appointment.  follow up with cardiac surgeon, Dr. Zavaleta, on November 20th at 11:30 am. Call to confirm appointment.  follow up with infectious disease doctor, Dr. Su Lang, on Dec. 16th at 12:00 pm for hospital follow up and for picc line  removal when antibiotics completed (12/11/19)  follow up with plastic surgeon, Dr. Simeon Shell, in 1 week for possible FELI drain removal. Call to schedule appointment. PRINCIPAL DISCHARGE DIAGNOSIS  Diagnosis: Sternal wound infection  Assessment and Plan of Treatment: shower daily/ wash incisions daily   weigh yourself daily  continue current prescriptions as ordered  increase activity as tolerated   no added salt; low fat; low cholesterol, low salt diet.   measure and record fran drainage every 4-6 hours   follow up with Cardiologist, Dr. Cadet,  in 1-2 weeks. Call to schedule appointment.  follow up with cardiac surgeon, Dr. Zavaleta, on November 20th at 11:30 am. Call to confirm appointment.  follow up with infectious disease doctor, Dr. Su Lang, on Dec. 16th at 12:00 pm for hospital follow up and for picc line  removal when antibiotics completed (12/11/19)  follow up with plastic surgeon, Dr. Simeon Shell, in 1 week for possible FRAN drain removal. Call to schedule appointment.

## 2019-11-06 NOTE — DISCHARGE NOTE PROVIDER - NSDCFUADDINST_GEN_ALL_CORE_FT
Plastic Surgery Instructions:  - You will be discharged with FELI drains. You will need to empty them and record outputs accurately. This will be taught to you by the nursing staff. Please do not remove the FELI drains. They will be removed in the office. Please bring to the office accurate records of output.  - Midline chest incision to remain open to air.  - Please continue dressing the open blister with dry gauze and tape until healed. Plastic Surgery Instructions:  - You will be discharged with FELI drains. You will need to empty them and record outputs accurately. This will be taught to you by the nursing staff. Please do not remove the FELI drains. They will be removed in the office. Please bring to the office accurate records of output.  - Midline chest incision to remain open to air.  - Please continue dressing the open blister with dry gauze and tape until healed.    call Dr. Zavaleta for fever > 101  refer to cardiac surgery do and don't checklist  no driving until cleared by Dr. Zavaleta  picc line removal after antibiotics completed on 12/11/19 with Dr. Lang on 12/16/19

## 2019-11-06 NOTE — DISCHARGE NOTE NURSING/CASE MANAGEMENT/SOCIAL WORK - NSDCFUADDAPPT_GEN_ALL_CORE_FT
Please follow up with Dr. Shell within x1 week after discharge from the hospital. You may call [phone] to schedule an appointment. You may follow up with Dr. Shell sooner for drain removal if drain output is less than 40 mL over 24hours.

## 2019-11-06 NOTE — DISCHARGE NOTE PROVIDER - NSDCMRMEDTOKEN_GEN_ALL_CORE_FT
aspirin 81 mg oral delayed release tablet: 1 tab(s) orally once a day  atorvastatin 40 mg oral tablet: 1 tab(s) orally once a day (at bedtime)  docusate sodium 100 mg oral capsule: 1 cap(s) orally 3 times a day  metoprolol tartrate 25 mg oral tablet: 3 tabs (=75 mg) orally 2 times a day   oxycodone-acetaminophen 5 mg-325 mg oral tablet: 1 tab(s) orally every 4 hours, As needed, Mod  Pain (1 - 3) MDD:6   may take 2 tabs for severe pain aspirin 81 mg oral delayed release tablet: 1 tab(s) orally once a day  atorvastatin 40 mg oral tablet: 1 tab(s) orally once a day (at bedtime)  docusate sodium 100 mg oral capsule: 1 cap(s) orally 3 times a day  furosemide 40 mg oral tablet: 1 tab(s) orally once a day - for 5 days only then stop  metoprolol tartrate 50 mg oral tablet: 1 tab(s) orally 2 times a day  oxycodone-acetaminophen 5 mg-325 mg oral tablet: 2 tab(s) orally every 6 hours, As Needed -Moderate Pain (4 - 6) MDD:4  potassium chloride 20 mEq oral tablet, extended release: 1 tab(s) orally once a day for 5 days only then stop  vancomycin 1 g intravenous injection: 1 gram(s) intravenous 2 times a day via picc until 12/11/19

## 2019-11-06 NOTE — DISCHARGE NOTE PROVIDER - NSDCPNSUBOBJ_GEN_ALL_CORE
VSS; pt afebrile     tele: RSR     midsternal incision cdi ot; 2 FELI drains to self-suction    lungs clear, RR easy unlabored     Gi: +bs nt nd + bm; neg n/v/d    Extrem: neg calf tenderness, SVG site cdi randall- healing well, trace LE and pedal edema, PPP bilaterally    right arm picc cdi with dressing        Discharge pt home today 11/6/19 as per ID/ Dr. Zavaleta and plastic surgery     abx til 12/11/19 then follow up with Dr. Lang after abx completed 12/11/19 for picc line removal    follow up with plastic surgeon, Dr. Shell, in 1 week for followup on sternal wound drains

## 2019-11-06 NOTE — PROGRESS NOTE ADULT - SUBJECTIVE AND OBJECTIVE BOX
VITAL SIGNS    Telemetry:    Vital Signs Last 24 Hrs  T(C): 36.6 (19 @ 08:47), Max: 37 (19 @ 00:03)  T(F): 97.8 (19 @ 08:47), Max: 98.6 (19 @ 00:03)  HR: 91 (19 @ 08:47) (88 - 95)  BP: 141/90 (19 @ 08:47) (106/75 - 164/83)  RR: 17 (19 @ 08:47) (16 - 18)  SpO2: 96% (19 @ 08:47) (94% - 97%)             @ 07:01  -   @ 07:00  --------------------------------------------------------  IN: 1230 mL / OUT: 730 mL / NET: 500 mL       Daily     Daily Weight in k.7 (2019 08:06)  Admit Wt: Drug Dosing Weight  Height (cm): 177.8 (30 Oct 2019 14:33)  Weight (kg): 74.9 (30 Oct 2019 14:33)  BMI (kg/m2): 23.7 (30 Oct 2019 14:33)  BSA (m2): 1.92 (30 Oct 2019 14:33)      CAPILLARY BLOOD GLUCOSE      POCT Blood Glucose.: 99 mg/dL (2019 07:59)  POCT Blood Glucose.: 128 mg/dL (2019 21:41)  POCT Blood Glucose.: 114 mg/dL (2019 16:59)  POCT Blood Glucose.: 150 mg/dL (2019 12:11)          aspirin enteric coated 81 milliGRAM(s) Oral daily  atorvastatin 40 milliGRAM(s) Oral at bedtime  bisacodyl 5 milliGRAM(s) Oral every 12 hours PRN  chlorhexidine 2% Cloths 1 Application(s) Topical <User Schedule>  chlorhexidine 4% Liquid 1 Application(s) Topical <User Schedule>  dextrose 50% Injectable 50 milliLiter(s) IV Push every 15 minutes  dextrose 50% Injectable 25 milliLiter(s) IV Push every 15 minutes  heparin  Injectable 5000 Unit(s) SubCutaneous every 8 hours  insulin lispro (HumaLOG) corrective regimen sliding scale   SubCutaneous Before meals and at bedtime  meropenem  IVPB 1000 milliGRAM(s) IV Intermittent every 8 hours  metoprolol tartrate 50 milliGRAM(s) Oral two times a day  ondansetron Injectable 4 milliGRAM(s) IV Push once PRN  oxycodone    5 mG/acetaminophen 325 mG 2 Tablet(s) Oral every 4 hours PRN  polyethylene glycol 3350 17 Gram(s) Oral daily  senna 2 Tablet(s) Oral at bedtime  sodium chloride 0.9% lock flush 10 milliLiter(s) IV Push every 1 hour PRN  sodium chloride 0.9% lock flush 3 milliLiter(s) IV Push every 8 hours  sodium chloride 0.9%. 1000 milliLiter(s) IV Continuous <Continuous>  vancomycin  IVPB 1000 milliGRAM(s) IV Intermittent every 12 hours      PHYSICAL EXAM    Subjective: "Hi.   Neurology: alert and oriented x 3, nonfocal, no gross deficits  CV : tele:  RSR  Sternal Wound :  CDI with dressing , Stable  Lungs: clear. RR easy, unlabored   Abdomen: soft, nontender, nondistended, positive bowel sounds, bowel movement   Neg N/V/D   :  pt voiding without difficulty   Extremities:   BENJAMIN; edema, neg calf tenderness.   PPP bilaterally      PW:  Chest tubes: VITAL SIGNS    Telemetry:  rsr 80's   Vital Signs Last 24 Hrs  T(C): 36.6 (19 @ 08:47), Max: 37 (19 @ 00:03)  T(F): 97.8 (19 @ 08:47), Max: 98.6 (19 @ 00:03)  HR: 91 (19 @ 08:47) (88 - 95)  BP: 141/90 (19 @ 08:47) (106/75 - 164/83)  RR: 17 (19 @ 08:47) (16 - 18)  SpO2: 96% (19 @ 08:47) (94% - 97%)             @ 07:01  -   @ 07:00  --------------------------------------------------------  IN: 1230 mL / OUT: 730 mL / NET: 500 mL       Daily     Daily Weight in k.7 (2019 08:06)  Admit Wt: Drug Dosing Weight  Height (cm): 177.8 (30 Oct 2019 14:33)  Weight (kg): 74.9 (30 Oct 2019 14:33)  BMI (kg/m2): 23.7 (30 Oct 2019 14:33)  BSA (m2): 1.92 (30 Oct 2019 14:33)      CAPILLARY BLOOD GLUCOSE      POCT Blood Glucose.: 99 mg/dL (2019 07:59)  POCT Blood Glucose.: 128 mg/dL (2019 21:41)  POCT Blood Glucose.: 114 mg/dL (2019 16:59)  POCT Blood Glucose.: 150 mg/dL (2019 12:11)          aspirin enteric coated 81 milliGRAM(s) Oral daily  atorvastatin 40 milliGRAM(s) Oral at bedtime  bisacodyl 5 milliGRAM(s) Oral every 12 hours PRN  chlorhexidine 2% Cloths 1 Application(s) Topical <User Schedule>  chlorhexidine 4% Liquid 1 Application(s) Topical <User Schedule>  dextrose 50% Injectable 50 milliLiter(s) IV Push every 15 minutes  dextrose 50% Injectable 25 milliLiter(s) IV Push every 15 minutes  heparin  Injectable 5000 Unit(s) SubCutaneous every 8 hours  insulin lispro (HumaLOG) corrective regimen sliding scale   SubCutaneous Before meals and at bedtime  meropenem  IVPB 1000 milliGRAM(s) IV Intermittent every 8 hours  metoprolol tartrate 50 milliGRAM(s) Oral two times a day  ondansetron Injectable 4 milliGRAM(s) IV Push once PRN  oxycodone    5 mG/acetaminophen 325 mG 2 Tablet(s) Oral every 4 hours PRN  polyethylene glycol 3350 17 Gram(s) Oral daily  senna 2 Tablet(s) Oral at bedtime  sodium chloride 0.9% lock flush 10 milliLiter(s) IV Push every 1 hour PRN  sodium chloride 0.9% lock flush 3 milliLiter(s) IV Push every 8 hours  sodium chloride 0.9%. 1000 milliLiter(s) IV Continuous <Continuous>  vancomycin  IVPB 1000 milliGRAM(s) IV Intermittent every 12 hours      PHYSICAL EXAM    Subjective: "I have some pain."   Neurology: alert and oriented x 3, nonfocal, no gross deficits  CV : tele:  RSR 80's -100   Sternal Wound :  CDI RANDALL- 2 Ari's draining moderate amount of serous sanguinous drainage   Lungs: clear. RR easy, unlabored   Abdomen: soft, nontender, nondistended, positive bowel sounds, + bowel movement   Neg N/V/D   :  pt voiding without difficulty   Extremities:   BENJAMIN; trace LE/ pedal edema, neg calf tenderness.   PPP bilaterally; SVG site cdi randall  + RUE picc cdi with dressing     PW: no  Chest tubes: no

## 2019-11-06 NOTE — PROGRESS NOTE ADULT - ASSESSMENT
69 yr old male re-admit with MT distal drainage x 5 days s/p C3L 10/17/19. Readmitted 10/28 for Sternal wound infection.  Hospital Course:   10/29 Blood cultures x 2 --> NTD. Wound culture NTD. Plastics following.  ID following on Vanco 1 Gram Q12 and Meropenum 1 Gram IV Q8 hours.   10/30: NPO today for Sternal wound debridement and muscle flap with Dr. Shell and Dr. Zavaleta-->Sternal debridement with removal of sternotomy wires with irrigation. OR cultures obtained. Pectoralis muscle flap on the R with pec advancement on the L.  10/31: Recovering in CTU. Received 2 units of PRBCs for low H&H. Vanco and Meropenem for sternal wound.   11/1: Pt transferred to [address] [place] [address] in stable condition. Prevena dressing in place to mid-sternum. 2 FELI drains on right and left mid chest with serosanguinous fluid. R pigtail placed on unit this AM for pleural effusion-continues to drain. Continuing Meropenem and Vancomycin as per ID while awaiting culture results.   11/2 VSS pigtail d/c this am  PICC placement pending  for  abx   11/3 PICC placed to RUE yesterday. Continue prevena vac to chest. Continue FELI drain care, monitor output. No heavy lifting, no pushing or pulling actions w/both arms, no transferring w/ b/l UE.   Anticipate Discharge home with IV abx and JPs   11/4   Shaneka,   2 FELI   ID following   prelim cx neg  from OR   11/5 Meropenum discontinued, BC and OR cultures negative for growth. IV abx infusion as out patient arranged. Maintain JPSS. Remove preveena dressing prior to discharge. Follow up with Plastic Surgery as out patient. Continue FELI drain care, monitor output. Patient to be discharged with drains, follow up with Dr. Shell for removal once output <40cc/24hr. Patient may call [phone] to schedule an appointment.  Check ESR and CRP with morning bloods 69 yr old male re-admit with MT distal drainage x 5 days s/p C3L 10/17/19. Readmitted 10/28 for Sternal wound infection.  Hospital Course:   10/29 Blood cultures x 2 --> NTD. Wound culture NTD. Plastics following.  ID following on Vanco 1 Gram Q12 and Meropenum 1 Gram IV Q8 hours.   10/30: NPO today for Sternal wound debridement and muscle flap with Dr. Shell and Dr. Zavaleta-->Sternal debridement with removal of sternotomy wires with irrigation. OR cultures obtained. Pectoralis muscle flap on the R with pec advancement on the L.  10/31: Recovering in CTU. Received 2 units of PRBCs for low H&H. Vanco and Meropenem for sternal wound.   11/1: Pt transferred to [address] [place] [address] in stable condition. Prevena dressing in place to mid-sternum. 2 FELI drains on right and left mid chest with serosanguinous fluid. R pigtail placed on unit this AM for pleural effusion-continues to drain. Continuing Meropenem and Vancomycin as per ID while awaiting culture results.   11/2 VSS pigtail d/c this am  PICC placement pending  for  abx   11/3 PICC placed to RUE yesterday. Continue prevena vac to chest. Continue FELI drain care, monitor output. No heavy lifting, no pushing or pulling actions w/both arms, no transferring w/ b/l UE.   Anticipate Discharge home with IV abx and JPs   11/4   Shaneka,   2 FELI   ID following   prelim cx neg  from OR   BC and OR cultures negative for growth. IV abx infusion as out patient arranged. Maintain JPSS. Remove preveena dressing prior to discharge. Follow up with Plastic Surgery as out patient. Continue FELI drain care, monitor output. Patient to be discharged with drains, follow up with Dr. Shell for removal once output <40cc/24hr. Patient may call [phone] to schedule an appointment.  Check ESR and CRP with morning bloods  11/5 prevena d/c- vss pt afebrile   11/6 VSS; wbc 7 and pt afebrile; continue 2 jps - ss as per plastic surgery;  continue abx as per ID- bc and OR cx neg; pt and ex wife refusing discharge today; pt stable for discharge as per Dr. Zavaleta 69 yr old male re-admit with MT distal drainage x 5 days s/p C3L 10/17/19. Readmitted 10/28 for Sternal wound infection.  Hospital Course:   10/29 Blood cultures x 2 --> NTD. Wound culture NTD. Plastics following.  ID following on Vanco 1 Gram Q12 and Meropenum 1 Gram IV Q8 hours.   10/30: NPO today for Sternal wound debridement and muscle flap with Dr. Shell and Dr. Zavaleta-->Sternal debridement with removal of sternotomy wires with irrigation. OR cultures obtained. Pectoralis muscle flap on the R with pec advancement on the L.  10/31: Recovering in CTU. Received 2 units of PRBCs for low H&H. Vanco and Meropenem for sternal wound.   11/1: Pt transferred to [address] [place] [address] in stable condition. Prevena dressing in place to mid-sternum. 2 FELI drains on right and left mid chest with serosanguinous fluid. R pigtail placed on unit this AM for pleural effusion-continues to drain. Continuing Meropenem and Vancomycin as per ID while awaiting culture results.   11/2 VSS pigtail d/c this am  PICC placement pending  for  abx   11/3 PICC placed to RUE yesterday. Continue prevena vac to chest. Continue FELI drain care, monitor output. No heavy lifting, no pushing or pulling actions w/both arms, no transferring w/ b/l UE.   Anticipate Discharge home with IV abx and JPs   11/4   Shaneka,   2 FELI   ID following   prelim cx neg  from OR   BC and OR cultures negative for growth. IV abx infusion as out patient arranged. Maintain JPSS. Remove preveena dressing prior to discharge. Follow up with Plastic Surgery as out patient. Continue FELI drain care, monitor output. Patient to be discharged with drains, follow up with Dr. Shell for removal once output <40cc/24hr. Patient may call [phone] to schedule an appointment.  Check ESR and CRP with morning bloods  11/5 prevena d/c- vss pt afebrile   11/6 VSS; wbc 7 and pt afebrile; continue 2 jps - ss as per plastic surgery;  continue abx as per ID- bc and OR cx neg;   diuresis with lasix;kdur for 3days   pt and ex wife refusing discharge today; pt stable for discharge as per Dr. Zavaleta

## 2019-11-06 NOTE — PROGRESS NOTE ADULT - PROVIDER SPECIALTY LIST ADULT
CT Surgery
Critical Care
Infectious Disease
Plastic Surgery
CT Surgery
Critical Care
Critical Care
Infectious Disease
CT Surgery
Infectious Disease
Plastic Surgery
Plastic Surgery
CT Surgery
Infectious Disease
Plastic Surgery
Plastic Surgery
Infectious Disease
CT Surgery

## 2019-11-06 NOTE — PROGRESS NOTE ADULT - REASON FOR ADMISSION
Sternal wound drainage
Sternal wound drainage   sp    SWD    wire removal
Sternal wound drainage

## 2019-11-06 NOTE — PROGRESS NOTE ADULT - PROBLEM SELECTOR PROBLEM 1
Drainage from wound
Drainage from wound
Sternal wound infection

## 2019-11-06 NOTE — DISCHARGE NOTE NURSING/CASE MANAGEMENT/SOCIAL WORK - NSDCPEWEB_GEN_ALL_CORE
[facility] for Tobacco Control website --- [email or web address] --- [email or web address]

## 2019-11-06 NOTE — DISCHARGE NOTE PROVIDER - NSDCHHHOMEBOUNDOTHER_GEN_ALL_CORE_FT
s/p open heart surgery; sternal wound debridement- 2 fran drains in place   antibiotics via picc until 12/11/19 as per ID - Dr. Su Lang

## 2019-11-06 NOTE — DISCHARGE NOTE NURSING/CASE MANAGEMENT/SOCIAL WORK - PATIENT PORTAL LINK FT
You can access the FollowMyHealth Patient Portal offered by [facility] by registering at the following website: [email or web address]. By joining [employer]’s FollowMyHealth portal, you will also be able to view your health information using other applications (apps) compatible with our system.

## 2019-11-06 NOTE — DISCHARGE NOTE PROVIDER - REASON FOR ADMISSION
Sternal wound drainage readmitted with sternal wound drainage Sternal wound drainage s/p sternal wound debridement with muscle flap/ wire removal on 10/30/19

## 2019-11-06 NOTE — DISCHARGE NOTE PROVIDER - HOSPITAL COURSE
69 yr old male re-admit with MT distal drainage x 5 days s/p C3L 10/17/19. Readmitted 10/28 for Sternal wound infection.    Hospital Course:     10/29 Blood cultures x 2 --> NTD. Wound culture NTD. Plastics following.  ID following on Vanco 1 Gram Q12 and Meropenum 1 Gram IV Q8 hours.     10/30: NPO today for Sternal wound debridement and muscle flap with Dr. Shell and Dr. Zavaleta-->Sternal debridement with removal of sternotomy wires with irrigation. OR cultures obtained. Pectoralis muscle flap on the R with pec advancement on the L.    10/31: Recovering in CTU. Received 2 units of PRBCs for low H&H. Vanco and Meropenem for sternal wound.     11/1: Pt transferred to [address] [place] [address] in stable condition. Prevena dressing in place to mid-sternum. 2 FRAN drains on right and left mid chest with serosanguinous fluid. R pigtail placed on unit this AM for pleural effusion-continues to drain. Continuing Meropenem and Vancomycin as per ID while awaiting culture results.     11/2 VSS pigtail d/c this am  PICC placement pending  for  abx 11/3 PICC placed to RUE yesterday. Continue prevena vac to chest. Continue FRAN drain care, monitor output. No heavy lifting, no pushing or pulling actions w/both arms, no transferring w/ b/l UE.     Anticipate Discharge home with IV abx and JPs     11/4   Shaneka,   2 FRAN   ID following   prelim cx neg  from OR   BC and OR cultures negative for growth. IV abx infusion as out patient arranged. Maintain JPSS. Remove preveena dressing prior to discharge. Follow up with Plastic Surgery as out patient. Continue FRAN drain care, monitor output. Patient to be discharged with drains, follow up with Dr. Shell for removal once output <40cc/24hr. Patient may call [phone] to schedule an appointment.    Check ESR and CRP with morning bloods    11/5 prevena d/c- vss pt afebrile     11/6 VSS; wbc 7 and pt afebrile; continue 2 jps - ss as per plastic surgery;  continue abx as per ID- bc and OR cx neg;     diuresis with lasix;kdur for 3days     11/6 discharge pt home today as per DR. Zavaleta with 2 fran drains as per plastic surgery- f/u 1 week with  for fran drains and abx til 12/11/19 as per ID via picc - Dr. Lang

## 2019-11-06 NOTE — PROGRESS NOTE ADULT - ASSESSMENT
69 yr old male direct admit from CTS office with complaints of cloudy distal drainage  from sternal wound x 5 days with associated chills and no fevers.  He is sp CABG x 3 10/17 19 with uncomplicated post op course.  His history includes HTN, NIDDM and remote cigarette smoking.  Mr Oropeza is admitted for wound culture,  blood culture and ID consult. (28 Oct 2019 15:42)  pt notes that discharge started last Wednesday 10/23/ When the drainage didn't improve or slow down, they called the cardiothoracic office today and was told to come in  Patient coughs, but unable to bring up phlegm  pt denies trauma to chest  pt terrie left shoulder pain since the time of surgery    Pt s/p purulent drainage from sternum, now S/P Sternal debridement with removal of sternotomy wires (X6) with irrigation. OR cultures obtained. Pectoralis muscle flap on the R with pec advancement on the L. Primary closure  was  on vancomycin and meropenem  bacterial cultures negative. Fungal and AFB cultures are pending     Curious that gram stain was negative and nt all samples showed WBCs, fluid ws purulent appearing   follow vancomycin level- level from 11/5 was good    final pathology- only sternal wires were sent . Again not only did nothing grow so far , but most of samples did not show polys  May send home on just vancomycin , to cover low virulence organisms    check ESR and CRP with morning bloods  await fungal and AFB cultures  will need to send vancomycin levels when at home as well    Drains still with fluid. Plastics are monitoring 69 yr old male direct admit from CTS office with complaints of cloudy distal drainage  from sternal wound x 5 days with associated chills and no fevers.  He is sp CABG x 3 10/17 19 with uncomplicated post op course.  His history includes HTN, NIDDM and remote cigarette smoking.  Mr Oropeza is admitted for wound culture,  blood culture and ID consult. (28 Oct 2019 15:42)  pt notes that discharge started last Wednesday 10/23/ When the drainage didn't improve or slow down, they called the cardiothoracic office today and was told to come in  Patient coughs, but unable to bring up phlegm  pt denies trauma to chest  pt terrie left shoulder pain since the time of surgery    Pt s/p purulent drainage from sternum, now S/P Sternal debridement with removal of sternotomy wires (X6) with irrigation. OR cultures obtained. Pectoralis muscle flap on the R with pec advancement on the L. Primary closure  was  on vancomycin and meropenem  bacterial cultures negative. Fungal and AFB cultures are pending     Curious that gram stain was negative and nt all samples showed WBCs, fluid ws purulent appearing   follow vancomycin level- level from 11/5 was good    final pathology- only sternal wires were sent . Again not only did nothing grow so far , but most of samples did not show polys  May send home on just vancomycin , to cover low virulence organisms    check ESR and CRP with morning bloods  await fungal and AFB cultures  will need to send vancomycin levels when at home as well    Drains still with fluid. Plastics are monitoring   Discussed with JUSTICE Yang- They noted some fat necrosis at time of surgery  WIres were removed at time of surgery and flap performed

## 2019-11-06 NOTE — PROGRESS NOTE ADULT - PROBLEM SELECTOR PROBLEM 2
S/P CABG (coronary artery bypass graft)

## 2019-11-06 NOTE — DISCHARGE NOTE PROVIDER - PROVIDER TOKENS
PROVIDER:[TOKEN:[[id number]],FOLLOWUP:[1 week]] PROVIDER:[TOKEN:[[id number]],FOLLOWUP:[1 week]],PROVIDER:[TOKEN:[[id number]],SCHEDULEDAPPT:[12/16/2019],SCHEDULEDAPPTTIME:[12:00 PM]],PROVIDER:[TOKEN:[[id number]],SCHEDULEDAPPT:[11/20/2019],SCHEDULEDAPPTTIME:[11:30 AM]],PROVIDER:[TOKEN:[[id number]]]

## 2019-11-06 NOTE — PROGRESS NOTE ADULT - PROBLEM SELECTOR PLAN 2
-Continue ASA 81mg, Lopressor 50mg BID, Atorvastatin 40mg QD -Continue ASA 81mg, Lopressor 50mg BID, Atorvastatin 40mg QD  pt stable at this time for discharge as per DR. Zavaleta - pt and family - ex wife refusing discharge Continue ASA 81mg, Lopressor 50mg BID, Atorvastatin 40mg QD  pt stable at this time for discharge as per DR. Zavaleta - pt and family - ex wife refusing discharge

## 2019-11-06 NOTE — DISCHARGE NOTE PROVIDER - CARE PROVIDERS DIRECT ADDRESSES
,DirectAddress_Unknown ,DirectAddress_Unknown,[email or web address]

## 2019-11-06 NOTE — PROGRESS NOTE ADULT - SUBJECTIVE AND OBJECTIVE BOX
Patient is a 69y old  Male who presents with a chief complaint of Sternal wound drainage (06 Nov 2019 10:59)    Being followed by ID for        Interval history:  pt with wife at bedside.   pt resting quietly  notes some discomfort at surgical site  breathing stable   No other acute events        PAST MEDICAL & SURGICAL HISTORY:  HTN (hypertension)  HLD (hyperlipidemia)  H/O impaired glucose tolerance  Coronary disease: sp  CABG    Allergies    No Known Allergies    Intolerances      Antimicrobials:    vancomycin  IVPB 1000 milliGRAM(s) IV Intermittent every 12 hours    MEDICATIONS  (STANDING):  aspirin enteric coated 81 milliGRAM(s) Oral daily  atorvastatin 40 milliGRAM(s) Oral at bedtime  chlorhexidine 2% Cloths 1 Application(s) Topical <User Schedule>  chlorhexidine 4% Liquid 1 Application(s) Topical <User Schedule>  dextrose 50% Injectable 50 milliLiter(s) IV Push every 15 minutes  dextrose 50% Injectable 25 milliLiter(s) IV Push every 15 minutes  furosemide    Tablet 40 milliGRAM(s) Oral daily  heparin  Injectable 5000 Unit(s) SubCutaneous every 8 hours  insulin lispro (HumaLOG) corrective regimen sliding scale   SubCutaneous Before meals and at bedtime  metoprolol tartrate 50 milliGRAM(s) Oral two times a day  polyethylene glycol 3350 17 Gram(s) Oral daily  potassium chloride    Tablet ER 20 milliEquivalent(s) Oral daily  senna 2 Tablet(s) Oral at bedtime  sodium chloride 0.9% lock flush 3 milliLiter(s) IV Push every 8 hours  sodium chloride 0.9%. 1000 milliLiter(s) (10 mL/Hr) IV Continuous <Continuous>  vancomycin  IVPB 1000 milliGRAM(s) IV Intermittent every 12 hours      Vital Signs Last 24 Hrs  T(C): 36.3 (11-06-19 @ 13:36), Max: 37 (11-06-19 @ 00:03)  T(F): 97.4 (11-06-19 @ 13:36), Max: 98.6 (11-06-19 @ 00:03)  HR: 94 (11-06-19 @ 13:36) (88 - 95)  BP: 149/84 (11-06-19 @ 13:36) (127/81 - 164/83)  BP(mean): --  RR: 18 (11-06-19 @ 13:36) (16 - 18)  SpO2: 95% (11-06-19 @ 13:36) (94% - 97%)    Physical Exam:    Constitutional well preserved,comfortable,    HEENT PERRLA EOMI,No pallor or icterus    No oral exudate or erythema    Neck supple no JVD or LN    Chest Good AE,CTA    CVS RRR S1 S2     sternal wound - intact  FELI in place    Abd soft BS normal No tenderness    Ext trace  edema    IV site no erythema tenderness or discharge    Joints no swelling or LOM    CNS AAO X 3 no focal    Lab Data:                          10.1   7.48  )-----------( 220      ( 06 Nov 2019 06:34 )             32.2       11-06    136  |  98  |  11  ----------------------------<  108<H>  4.3   |  27  |  0.67    Ca    9.2      06 Nov 2019 06:34      .Body Fluid deep culture chest  10-31-19   Testing in progress  --  --      .Tissue deep tissue culture chest  10-31-19   Testing in progress  --    No polymorphonuclear cells seen  No organisms seen      .Body Fluid Pericardial Fluid  10-31-19   No growth at 5 days  --    polymorphonuclear leukocytes seen  No organisms seen  by cytocentrifuge      .Body Fluid superficial culture  10-31-19   Testing in progress  --    No polymorphonuclear cells seen  No organisms seen  by cytocentrifuge        Vancomycin Level, Trough: 16.8 ug/mL (11-05-19 @ 05:05)      WBC Count: 7.48 (11-06-19 @ 06:34)  WBC Count: 8.14 (11-05-19 @ 05:04)  WBC Count: 8.80 (11-04-19 @ 05:46)  WBC Count: 9.37 (11-03-19 @ 07:45)  WBC Count: 10.61 (11-02-19 @ 04:49)  WBC Count: 11.92 (11-01-19 @ 02:10)  WBC Count: 10.09 (10-31-19 @ 09:13)  WBC Count: 8.42 (10-31-19 @ 00:48)  WBC Count: 10.68 (10-30-19 @ 19:40)

## 2019-11-07 ENCOUNTER — INBOUND DOCUMENT (OUTPATIENT)
Age: 69
End: 2019-11-07

## 2019-11-20 ENCOUNTER — APPOINTMENT (OUTPATIENT)
Dept: CARDIOTHORACIC SURGERY | Facility: CLINIC | Age: 69
End: 2019-11-20

## 2019-11-30 LAB
CULTURE RESULTS: SIGNIFICANT CHANGE UP
SPECIMEN SOURCE: SIGNIFICANT CHANGE UP

## 2019-12-16 ENCOUNTER — APPOINTMENT (OUTPATIENT)
Dept: INFECTIOUS DISEASE | Facility: CLINIC | Age: 69
End: 2019-12-16
Payer: MEDICARE

## 2019-12-16 VITALS
HEART RATE: 73 BPM | RESPIRATION RATE: 16 BRPM | WEIGHT: 152 LBS | HEIGHT: 70 IN | DIASTOLIC BLOOD PRESSURE: 87 MMHG | TEMPERATURE: 97.9 F | OXYGEN SATURATION: 98 % | SYSTOLIC BLOOD PRESSURE: 149 MMHG | BODY MASS INDEX: 21.76 KG/M2

## 2019-12-16 DIAGNOSIS — T81.49XA INFECTION FOLLOWING A PROCEDURE, OTHER SURGICAL SITE, INITIAL ENCOUNTER: ICD-10-CM

## 2019-12-16 DIAGNOSIS — Z86.79 PERSONAL HISTORY OF OTHER DISEASES OF THE CIRCULATORY SYSTEM: ICD-10-CM

## 2019-12-16 DIAGNOSIS — R73.03 PREDIABETES.: ICD-10-CM

## 2019-12-16 DIAGNOSIS — Z87.891 PERSONAL HISTORY OF NICOTINE DEPENDENCE: ICD-10-CM

## 2019-12-16 DIAGNOSIS — Z86.39 PERSONAL HISTORY OF OTHER ENDOCRINE, NUTRITIONAL AND METABOLIC DISEASE: ICD-10-CM

## 2019-12-16 PROCEDURE — 99215 OFFICE O/P EST HI 40 MIN: CPT

## 2019-12-16 RX ORDER — DOCUSATE SODIUM 100 MG/1
CAPSULE ORAL
Refills: 0 | Status: ACTIVE | COMMUNITY

## 2019-12-16 RX ORDER — METOPROLOL TARTRATE 50 MG/1
50 TABLET ORAL
Refills: 0 | Status: ACTIVE | COMMUNITY
Start: 2019-10-28

## 2020-01-22 NOTE — ED PROVIDER NOTE - NS ED MD DISPO SPECIAL CONSIDERATION1
[facility] PRIMARY CARE  [address] [address] [facility] [address]  Dept: [address] is a 78 y.o. female who presents today for her medical conditions/complaints as noted below. Chief Complaint   Patient presents with    Eye Problem     Pt states lt eye redness with itch and slight discharge x 1 month. Pt states bump on yop lid. Pt has been using eye drops without relief. HPI:     HPI Jose Antonio Enriquez is here today for a sore on her eyelid. It itches mostly and is tender at times. She's been trying to squeeze it, scant amount of drainage was expelled. The patient is also using ice on it to help with the tenderness. She denies cough, runny nose, congestion, and or sore throat. Patient wears glasses. The patient is also asking about her DEXA scan results. No one contacted her about them and it was completed back in December.      LDL Cholesterol (mg/dL)   Date Value   11/21/2019 105     LDL Calculated (mg/dL)   Date Value   06/07/2017 131       (goal LDL is <100)   BUN (mg/dL)   Date Value   11/21/2019 19     BP Readings from Last 3 Encounters:   01/22/20 (!) 176/102   11/21/19 (!) 140/90   08/21/19 118/68          (goal 120/80)    Past Medical History:   Diagnosis Date    Bell's palsy     Cellulitis     Hypertension       Past Surgical History:   Procedure Laterality Date    CATARACT REMOVAL Bilateral 2015    HYSTERECTOMY, TOTAL ABDOMINAL      INCISION AND DRAINAGE Left 7/6/2017    LEG INCISION AND DRAINAGE ABSCESS performed by Mitra Jasmine MD at [facility] Right 09/24/2018    TOTAL KNEE ARTHROPLASTY Left 11/09/2017    VENTRAL HERNIA REPAIR  02/11/2019    5 hernias       Family History   Problem Relation Age of Onset    Cancer Mother     High Blood Pressure Father     Stroke Father     Diabetes Maternal Aunt     Cancer Other         daughter/ovarian cancer    Cancer Daughter        Social History     Tobacco Use Eyes: Positive for discharge (left upper eyelid) and itching. Negative for photophobia, pain, redness and visual disturbance. Respiratory: Negative for cough, shortness of breath and wheezing. Cardiovascular: Negative for chest pain and palpitations. Gastrointestinal: Negative for diarrhea, nausea and vomiting. Neurological: Negative for dizziness, light-headedness and headaches. Objective:     BP (!) 176/102   Pulse 68   Wt 283 lb 3.2 oz (128.5 kg)   SpO2 98%   BMI 51.73 kg/m²   Physical Exam  Vitals signs (per patient, she just took her antihypertensive less than 1 hour ago) and nursing note reviewed. Constitutional:       Appearance: Normal appearance. HENT:      Head: Normocephalic and atraumatic. Nose: Nose normal.      Mouth/Throat:      Lips: Pink. Mouth: Mucous membranes are moist.      Pharynx: Oropharynx is clear. Uvula midline. Eyes:      General:         Left eye: Hordeolum (left upper eyelid) present. No foreign body or discharge. Extraocular Movements: Extraocular movements intact. Conjunctiva/sclera: Conjunctivae normal.      Pupils: Pupils are equal, round, and reactive to light. Neck:      Musculoskeletal: Normal range of motion and neck supple. Cardiovascular:      Rate and Rhythm: Normal rate and regular rhythm. Heart sounds: Normal heart sounds. Pulmonary:      Effort: Pulmonary effort is normal.      Breath sounds: Normal breath sounds. Skin:     General: Skin is warm and dry. Neurological:      General: No focal deficit present. Mental Status: She is alert and oriented to person, place, and time. Mental status is at baseline. Psychiatric:         Mood and Affect: Mood normal.         Behavior: Behavior normal.         Assessment:       Diagnosis Orders   1. Hordeolum externum of left upper eyelid  bacitracin 500 UNIT/GM ophthalmic ointment   2. Healthcare maintenance  Calcium Citrate-Vitamin D 500-500 MG-UNIT PACK   3. None

## 2020-04-09 ENCOUNTER — APPOINTMENT (OUTPATIENT)
Dept: DISASTER EMERGENCY | Facility: CLINIC | Age: 70
End: 2020-04-09
Payer: MEDICARE

## 2020-04-09 DIAGNOSIS — R68.89 OTHER GENERAL SYMPTOMS AND SIGNS: ICD-10-CM

## 2020-04-09 DIAGNOSIS — R05 COUGH: ICD-10-CM

## 2020-04-09 DIAGNOSIS — R06.02 SHORTNESS OF BREATH: ICD-10-CM

## 2020-04-09 DIAGNOSIS — U07.1 COVID-19: ICD-10-CM

## 2020-04-09 PROCEDURE — 99213 OFFICE O/P EST LOW 20 MIN: CPT

## 2020-04-11 LAB — SARS-COV-2 N GENE NPH QL NAA+PROBE: ABNORMAL

## 2020-05-25 PROBLEM — R06.02 SHORTNESS OF BREATH AT REST: Status: ACTIVE | Noted: 2020-05-25

## 2020-05-25 PROBLEM — R05 COUGH: Status: ACTIVE | Noted: 2020-05-25

## 2020-05-25 PROBLEM — U07.1 2019 NOVEL CORONAVIRUS DISEASE (COVID-19): Status: ACTIVE | Noted: 2020-05-25

## 2020-05-25 NOTE — HISTORY OF PRESENT ILLNESS
[Patient presents to the office today for COVID-19 evaluation and testing.] : Patient presents to the office today for COVID-19 evaluation and testing. [Patient has been pre-screened by RN at call center for appointment today with our facility.] : Patient has been pre-screened by RN at call center for appointment today with our facility. [] : no dizziness on standing [None Known] : none known [None] : none [Clear] : clear [Normal O2 sat at rest] : normal O2 sat at rest [Grossly normal, interacts, not tired or weak] : grossly normal, interacts, not tired or weak [Discharged with current Quarantine instructions and advised of signs of worsening illness.] : Patient discharged with current quarantine instructions and advised of signs of worsening illness. Patient told to seek emergent care if symptoms occur. [FreeTextEntry1] : CHEVY AMARAL is a   Y/O  patient   CC:"headache, sneezing and cough  "\par \par PHMx:HTN,HLD CAD, CABG\par \par HPI:Mr. Amaral is feeling tired and had a headache\par  [TextBox_107] : Instructed patient to seek emergency medical treatment if SOB at rest develops, not able to speak in full sentences\par or worsening of any symptoms.\par Stay home when you are sick and attempt to self-quarantine yourself. \par · Try to choose a room in your home that can be used to separate sick household members from self-quarantined; preferably with a separate bathroom if possible. \par · Try to avoid close contact (within 6 feet or 2 meters) with other people and keep any interactions brief; as brief interactions are less likely to result in transmission. \par · If you have a mask and can tolerate wearing it; use it when around people. \par · If you have masks but can’t tolerate wearing it have other people wear a mask when in the same room. \par · You should not share dishes, drinking glasses, cups, eating utensils, towels, or bedding with other people or pets in your home. After using these items, they should be washed thoroughly with soap and water \par · Make sure that shared spaces in the home have good air flow, such as by an air conditioner or an opened window, weather permitting. \par · Prohibit visitors who do not have an essential need to be in the home. \par · You should restrict contact with pets and other animals while you are sick.\par \par  [TextBox_48] : Headache, runny nose

## 2021-10-06 PROBLEM — I10 ESSENTIAL HYPERTENSION: Status: ACTIVE | Noted: 2019-02-20

## 2021-11-20 NOTE — PATIENT PROFILE ADULT - PRO INTERPRETER NEED 2
English Consent 2/Introductory Paragraph: Mohs surgery was explained to the patient and consent was obtained. The risks, benefits and alternatives to therapy were discussed in detail. Specifically, the risks of infection, scarring, bleeding, prolonged wound healing, incomplete removal, allergy to anesthesia, nerve injury and recurrence were addressed. Prior to the procedure, the treatment site was clearly identified and confirmed by the patient. All components of Universal Protocol/PAUSE Rule completed.

## 2022-11-02 NOTE — PATIENT PROFILE ADULT - NSPROGENBLOODRESTRICT_GEN_A_NUR
Adult male d/c from nursing facility yesterday. Caretaker concerned for hyperglycemia. Pt on long acting insulin but not on short acting. Will evaluate for DKA and reassess. none

## 2024-04-18 RX ORDER — VANCOMYCIN HCL 1 G
1 VIAL (EA) INTRAVENOUS
Qty: 0 | Refills: 0 | DISCHARGE
